# Patient Record
Sex: MALE | Race: WHITE | NOT HISPANIC OR LATINO
[De-identification: names, ages, dates, MRNs, and addresses within clinical notes are randomized per-mention and may not be internally consistent; named-entity substitution may affect disease eponyms.]

---

## 2019-06-05 ENCOUNTER — APPOINTMENT (OUTPATIENT)
Dept: COLORECTAL SURGERY | Facility: CLINIC | Age: 74
End: 2019-06-05
Payer: MEDICARE

## 2019-06-05 VITALS
HEART RATE: 60 BPM | HEIGHT: 71 IN | DIASTOLIC BLOOD PRESSURE: 80 MMHG | SYSTOLIC BLOOD PRESSURE: 120 MMHG | BODY MASS INDEX: 24.5 KG/M2 | RESPIRATION RATE: 14 BRPM | WEIGHT: 175 LBS

## 2019-06-05 DIAGNOSIS — Z78.9 OTHER SPECIFIED HEALTH STATUS: ICD-10-CM

## 2019-06-05 DIAGNOSIS — R10.9 UNSPECIFIED ABDOMINAL PAIN: ICD-10-CM

## 2019-06-05 PROCEDURE — 99203 OFFICE O/P NEW LOW 30 MIN: CPT

## 2019-06-06 NOTE — PHYSICAL EXAM
[Normal Heart Sounds] : normal heart sounds [Normal Breath Sounds] : Normal breath sounds [Normal Rate and Rhythm] : normal rate and rhythm [No Edema] : No edema [Alert] : alert [Oriented to Person] : oriented to person [Oriented to Place] : oriented to place [Oriented to Time] : oriented to time [Calm] : calm [de-identified] : flat soft +BS NT/ND left sided ostomy [de-identified] : NC/AT [de-identified] : +ROM [de-identified] : intact

## 2019-06-06 NOTE — ASSESSMENT
[FreeTextEntry1] : Pleasant 73-year-old white gentleman who presents with a chief complaint of right upper abdominal pain. 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 Despite my urging him to have appropriate follow-up he has not done so. He has not followed up with oncology and never had a follow-up colonoscopy.\par \par Physical examination reveals a soft, nontender, nondistended abdomen. His midline scar is well healed. He has a permanent left lower quadrant end colostomy with a well adherent ostomy appliance in place. No mass is  palpable in the vicinity of his pain.\par \par We will start by obtaining a CAT scan of the abdomen and pelvis to be sure that there is no recurrent or metastatic disease. He also requires a colonoscopy via the ostomy. If this work-up is negative, I will refer him to gastroenterology for additional testing.

## 2019-06-06 NOTE — HISTORY OF PRESENT ILLNESS
[FreeTextEntry1] : 72yo WM underwent APR in 7.2010 secondary to carcinoma of the rectum (received neoadjuvant/adjuvant therapies).  Denies oncologic or GI follow-up.  \par \par Over the past 6months pt has been experiencing intermittent abdominal discomfort (to right of umbilicus).  Over the past month pain more constant.  Intentionally limiting his diet. Denies N/V or change in stoma output.  Has scheduled GI appt for August 2019.

## 2019-06-07 ENCOUNTER — APPOINTMENT (OUTPATIENT)
Dept: CT IMAGING | Facility: CLINIC | Age: 74
End: 2019-06-07
Payer: MEDICARE

## 2019-06-07 ENCOUNTER — OUTPATIENT (OUTPATIENT)
Dept: OUTPATIENT SERVICES | Facility: HOSPITAL | Age: 74
LOS: 1 days | End: 2019-06-07
Payer: MEDICARE

## 2019-06-07 DIAGNOSIS — Z00.8 ENCOUNTER FOR OTHER GENERAL EXAMINATION: ICD-10-CM

## 2019-06-07 PROCEDURE — 74177 CT ABD & PELVIS W/CONTRAST: CPT

## 2019-06-07 PROCEDURE — 82565 ASSAY OF CREATININE: CPT

## 2019-06-07 PROCEDURE — 74177 CT ABD & PELVIS W/CONTRAST: CPT | Mod: 26

## 2019-07-15 ENCOUNTER — APPOINTMENT (OUTPATIENT)
Dept: COLORECTAL SURGERY | Facility: CLINIC | Age: 74
End: 2019-07-15
Payer: MEDICARE

## 2019-07-15 DIAGNOSIS — K63.5 POLYP OF COLON: ICD-10-CM

## 2019-07-15 PROCEDURE — 45385 COLONOSCOPY W/LESION REMOVAL: CPT

## 2019-07-17 LAB — CORE LAB BIOPSY: NORMAL

## 2019-09-17 ENCOUNTER — APPOINTMENT (OUTPATIENT)
Dept: GASTROENTEROLOGY | Facility: CLINIC | Age: 74
End: 2019-09-17

## 2019-09-20 ENCOUNTER — APPOINTMENT (OUTPATIENT)
Dept: GASTROENTEROLOGY | Facility: CLINIC | Age: 74
End: 2019-09-20

## 2019-12-10 ENCOUNTER — APPOINTMENT (OUTPATIENT)
Dept: GASTROENTEROLOGY | Facility: CLINIC | Age: 74
End: 2019-12-10

## 2024-05-01 ENCOUNTER — APPOINTMENT (OUTPATIENT)
Dept: OTOLARYNGOLOGY | Facility: CLINIC | Age: 79
End: 2024-05-01
Payer: MEDICARE

## 2024-05-01 VITALS
RESPIRATION RATE: 16 BRPM | WEIGHT: 185 LBS | SYSTOLIC BLOOD PRESSURE: 164 MMHG | HEIGHT: 71 IN | BODY MASS INDEX: 25.9 KG/M2 | DIASTOLIC BLOOD PRESSURE: 91 MMHG | HEART RATE: 95 BPM | OXYGEN SATURATION: 96 %

## 2024-05-01 DIAGNOSIS — R22.1 LOCALIZED SWELLING, MASS AND LUMP, NECK: ICD-10-CM

## 2024-05-01 PROCEDURE — 31575 DIAGNOSTIC LARYNGOSCOPY: CPT

## 2024-05-01 PROCEDURE — 99204 OFFICE O/P NEW MOD 45 MIN: CPT | Mod: 25

## 2024-05-01 NOTE — HISTORY OF PRESENT ILLNESS
[de-identified] : 77yo male who is accompanied by his wife and daughter Jessie. He is being referred by Dr. CARON Lawson for recent MRI findings, MRI done for right ear pain.Denies pain, dysphagia,, dysphonia and or dyspnea. He feels as he has water in the right ear and feels pressure. Recent  visit to urgent care for ear pain. Has been taking Tylenol and Advil for discpomfort.  4/11/2024 MRI STN showing mixed solid/cystic right oral cavity/oropharyngeal mass, measuring up to 49mm.  -Enlarged 19mm right and 15mm left level 2A lymph nodes.-Asymmeytric right parotid gland which may represent parotitis. - multinodular thyroid with 23 mm right thyroid nodule. -large  right and moderate left mastoid effusions.   Denies cardiac or lung issues. not on blood thinners.  Reports having shingles on his right side scalp in Feb.  Denies hx. of head and neck cancer or skin cancer.  Smoke for over [id number] quit 2010. Used to drink 2 beers a day but now about a 6 pack a week.  all archer started w R sided ear pain, same for 5-6 mo. is not pain,but rather pressure in R ear. no swaloow cx. no resp cxs. quir smoking 2010.

## 2024-05-01 NOTE — CONSULT LETTER
[Dear  ___] : Dear  [unfilled], [Consult Letter:] : I had the pleasure of evaluating your patient, [unfilled]. [Please see my note below.] : Please see my note below. [Consult Closing:] : Thank you very much for allowing me to participate in the care of this patient.  If you have any questions, please do not hesitate to contact me. [Sincerely,] : Sincerely, [FreeTextEntry2] : Adam Lawson MD ([place], [place]) [FreeTextEntry3] : Dhiraj Camarillo MD, FACS     [facility] Associate Chair    Department of Otolaryngology  Professor Otolaryngology & Molecular Medicine [facility]

## 2024-05-01 NOTE — PROCEDURE
[Lesion] : lesion identified by mirror examination needing further evaluation [None] : none [Flexible Endoscope] : examined with the flexible endoscope [Normal] : normal [de-identified] : erythema R BOT.  nl larynx [de-identified] : see above

## 2024-05-01 NOTE — PHYSICAL EXAM
[de-identified] : no palp nodes [FreeTextEntry1] : firm mass R tonsil w extension to R lat BOT.  [Normal] : no rashes

## 2024-05-02 ENCOUNTER — NON-APPOINTMENT (OUTPATIENT)
Age: 79
End: 2024-05-02

## 2024-05-02 ENCOUNTER — OUTPATIENT (OUTPATIENT)
Dept: OUTPATIENT SERVICES | Facility: HOSPITAL | Age: 79
LOS: 1 days | End: 2024-05-02

## 2024-05-02 VITALS
OXYGEN SATURATION: 97 % | DIASTOLIC BLOOD PRESSURE: 116 MMHG | WEIGHT: 184.97 LBS | TEMPERATURE: 98 F | HEIGHT: 71 IN | HEART RATE: 63 BPM | SYSTOLIC BLOOD PRESSURE: 176 MMHG | RESPIRATION RATE: 17 BRPM

## 2024-05-02 DIAGNOSIS — C10.9 MALIGNANT NEOPLASM OF OROPHARYNX, UNSPECIFIED: ICD-10-CM

## 2024-05-02 DIAGNOSIS — Z92.21 PERSONAL HISTORY OF ANTINEOPLASTIC CHEMOTHERAPY: Chronic | ICD-10-CM

## 2024-05-02 DIAGNOSIS — Z93.3 COLOSTOMY STATUS: Chronic | ICD-10-CM

## 2024-05-02 DIAGNOSIS — C20 MALIGNANT NEOPLASM OF RECTUM: Chronic | ICD-10-CM

## 2024-05-02 DIAGNOSIS — Z92.3 PERSONAL HISTORY OF IRRADIATION: Chronic | ICD-10-CM

## 2024-05-02 LAB
ANION GAP SERPL CALC-SCNC: 15 MMOL/L — HIGH (ref 7–14)
BUN SERPL-MCNC: 13 MG/DL — SIGNIFICANT CHANGE UP (ref 7–23)
CALCIUM SERPL-MCNC: 9.4 MG/DL — SIGNIFICANT CHANGE UP (ref 8.4–10.5)
CHLORIDE SERPL-SCNC: 106 MMOL/L — SIGNIFICANT CHANGE UP (ref 98–107)
CO2 SERPL-SCNC: 18 MMOL/L — LOW (ref 22–31)
CREAT SERPL-MCNC: 0.99 MG/DL — SIGNIFICANT CHANGE UP (ref 0.5–1.3)
EGFR: 79 ML/MIN/1.73M2 — SIGNIFICANT CHANGE UP
GLUCOSE SERPL-MCNC: 104 MG/DL — HIGH (ref 70–99)
HCT VFR BLD CALC: 48.1 % — SIGNIFICANT CHANGE UP (ref 39–50)
HGB BLD-MCNC: 16.4 G/DL — SIGNIFICANT CHANGE UP (ref 13–17)
MCHC RBC-ENTMCNC: 30.6 PG — SIGNIFICANT CHANGE UP (ref 27–34)
MCHC RBC-ENTMCNC: 34.1 GM/DL — SIGNIFICANT CHANGE UP (ref 32–36)
MCV RBC AUTO: 89.7 FL — SIGNIFICANT CHANGE UP (ref 80–100)
NRBC # BLD: 0 /100 WBCS — SIGNIFICANT CHANGE UP (ref 0–0)
NRBC # FLD: 0 K/UL — SIGNIFICANT CHANGE UP (ref 0–0)
PLATELET # BLD AUTO: 372 K/UL — SIGNIFICANT CHANGE UP (ref 150–400)
POTASSIUM SERPL-MCNC: 4 MMOL/L — SIGNIFICANT CHANGE UP (ref 3.5–5.3)
POTASSIUM SERPL-SCNC: 4 MMOL/L — SIGNIFICANT CHANGE UP (ref 3.5–5.3)
RBC # BLD: 5.36 M/UL — SIGNIFICANT CHANGE UP (ref 4.2–5.8)
RBC # FLD: 14.1 % — SIGNIFICANT CHANGE UP (ref 10.3–14.5)
SODIUM SERPL-SCNC: 139 MMOL/L — SIGNIFICANT CHANGE UP (ref 135–145)
WBC # BLD: 9.45 K/UL — SIGNIFICANT CHANGE UP (ref 3.8–10.5)
WBC # FLD AUTO: 9.45 K/UL — SIGNIFICANT CHANGE UP (ref 3.8–10.5)

## 2024-05-02 PROCEDURE — 93010 ELECTROCARDIOGRAM REPORT: CPT

## 2024-05-02 RX ORDER — SODIUM CHLORIDE 9 MG/ML
1000 INJECTION, SOLUTION INTRAVENOUS
Refills: 0 | Status: DISCONTINUED | OUTPATIENT
Start: 2024-05-09 | End: 2024-05-23

## 2024-05-02 RX ORDER — SODIUM CHLORIDE 9 MG/ML
3 INJECTION INTRAMUSCULAR; INTRAVENOUS; SUBCUTANEOUS EVERY 8 HOURS
Refills: 0 | Status: DISCONTINUED | OUTPATIENT
Start: 2024-05-09 | End: 2024-05-23

## 2024-05-02 NOTE — H&P PST ADULT - BIRTH SEX
Patient notified and I provided him with the number to [facility]. I also advised him to do warm compresses (clean washcloths each time) and change pillowcase daily.    Male

## 2024-05-02 NOTE — H&P PST ADULT - HISTORY OF PRESENT ILLNESS
77 y/o male with h/o rectal cancer s/p abdominoperineal resection with creation of colostomy in 2010, chemo and RT presents to PST preop for direct laryngoscopy, biopsy oropharynx, tonsillectomy. Pt reports right ear pain x 7 months. MRI on 4/11/2024 showed mixed solid/cystic right oral cavity/oropharyngeal mass.

## 2024-05-02 NOTE — H&P PST ADULT - NSICDXPASTSURGICALHX_GEN_ALL_CORE_FT
PAST SURGICAL HISTORY:  History of cancer chemotherapy     Rectal cancer     S/P Colonoscopy 3/10    S/P colostomy     S/P radiation therapy

## 2024-05-02 NOTE — H&P PST ADULT - PROBLEM SELECTOR PLAN 1
PPS 50%. Skin care PRN. Assist with ADL's PRN. Appreciate PT involvement. preop for direct laryngoscopy, biopsy oropharynx, tonsillectomy on 5/9/24  preop instructions given, pt verbalized understanding  GI prophylaxis provided  cbc, bmp pending  repeat /90 & 150/94- pt referred to Dr. Mitchell for preop evaluation due to elevated BP reading. Pt has no h/o HTN and does not have a primary care physician. preop for direct laryngoscopy, biopsy oropharynx, tonsillectomy on 5/9/24  preop instructions given, pt verbalized understanding  GI prophylaxis provided  cbc, bmp pending  repeat /90 & 150/94- pt referred to Dr. Mitchell for preop evaluation due to elevated BP reading. Pt has no h/o HTN and does not have a primary care physician. He is scheduled to see Dr. Mitchell at 11am on 5/3/24.   email sent to surgeon.

## 2024-05-02 NOTE — H&P PST ADULT - NEGATIVE ENMT SYMPTOMS
no hearing difficulty/no sinus symptoms/no nasal obstruction/no post-nasal discharge/no nose bleeds/no recurrent cold sores/no abnormal taste sensation/no gum bleeding/no dry mouth/no throat pain/no dysphagia

## 2024-05-02 NOTE — H&P PST ADULT - ASSESSMENT
79 y/o male with h/o rectal cancer s/p abdominoperineal resection with creation of colostomy in 2010, chemo and RT presents to PST preop for direct laryngoscopy, biopsy oropharynx, tonsillectomy. Pt reports right ear pain x 7 months. MRI on 4/11/2024 showed mixed solid/cystic right oral cavity/oropharyngeal mass.

## 2024-05-07 ENCOUNTER — OUTPATIENT (OUTPATIENT)
Dept: OUTPATIENT SERVICES | Facility: HOSPITAL | Age: 79
LOS: 1 days | End: 2024-05-07
Payer: MEDICARE

## 2024-05-07 ENCOUNTER — APPOINTMENT (OUTPATIENT)
Dept: ULTRASOUND IMAGING | Facility: CLINIC | Age: 79
End: 2024-05-07
Payer: MEDICARE

## 2024-05-07 DIAGNOSIS — R22.1 LOCALIZED SWELLING, MASS AND LUMP, NECK: ICD-10-CM

## 2024-05-07 DIAGNOSIS — Z92.21 PERSONAL HISTORY OF ANTINEOPLASTIC CHEMOTHERAPY: Chronic | ICD-10-CM

## 2024-05-07 DIAGNOSIS — Z93.3 COLOSTOMY STATUS: Chronic | ICD-10-CM

## 2024-05-07 DIAGNOSIS — Z92.3 PERSONAL HISTORY OF IRRADIATION: Chronic | ICD-10-CM

## 2024-05-07 DIAGNOSIS — C20 MALIGNANT NEOPLASM OF RECTUM: Chronic | ICD-10-CM

## 2024-05-07 PROBLEM — C10.9 MALIGNANT NEOPLASM OF OROPHARYNX, UNSPECIFIED: Chronic | Status: ACTIVE | Noted: 2024-05-02

## 2024-05-07 PROCEDURE — 76536 US EXAM OF HEAD AND NECK: CPT

## 2024-05-07 PROCEDURE — 76536 US EXAM OF HEAD AND NECK: CPT | Mod: 26

## 2024-05-08 ENCOUNTER — APPOINTMENT (OUTPATIENT)
Dept: CV DIAGNOSTICS | Facility: HOSPITAL | Age: 79
End: 2024-05-08

## 2024-05-08 ENCOUNTER — RESULT REVIEW (OUTPATIENT)
Age: 79
End: 2024-05-08

## 2024-05-08 ENCOUNTER — TRANSCRIPTION ENCOUNTER (OUTPATIENT)
Age: 79
End: 2024-05-08

## 2024-05-08 ENCOUNTER — OUTPATIENT (OUTPATIENT)
Dept: OUTPATIENT SERVICES | Facility: HOSPITAL | Age: 79
LOS: 1 days | End: 2024-05-08
Payer: MEDICARE

## 2024-05-08 DIAGNOSIS — Z92.21 PERSONAL HISTORY OF ANTINEOPLASTIC CHEMOTHERAPY: Chronic | ICD-10-CM

## 2024-05-08 DIAGNOSIS — Z92.3 PERSONAL HISTORY OF IRRADIATION: Chronic | ICD-10-CM

## 2024-05-08 DIAGNOSIS — Z93.3 COLOSTOMY STATUS: Chronic | ICD-10-CM

## 2024-05-08 DIAGNOSIS — R94.39 ABNORMAL RESULT OF OTHER CARDIOVASCULAR FUNCTION STUDY: ICD-10-CM

## 2024-05-08 PROCEDURE — 93016 CV STRESS TEST SUPVJ ONLY: CPT | Mod: GC,MC

## 2024-05-08 PROCEDURE — 93018 CV STRESS TEST I&R ONLY: CPT | Mod: GC,MC

## 2024-05-08 PROCEDURE — 78452 HT MUSCLE IMAGE SPECT MULT: CPT | Mod: 26,MC

## 2024-05-08 NOTE — ASU PATIENT PROFILE, ADULT - FALL HARM RISK - UNIVERSAL INTERVENTIONS
Bed in lowest position, wheels locked, appropriate side rails in place/Call bell, personal items and telephone in reach/Instruct patient to call for assistance before getting out of bed or chair/Non-slip footwear when patient is out of bed/[place] to call system/Physically safe environment - no spills, clutter or unnecessary equipment/Purposeful Proactive Rounding/Room/bathroom lighting operational, light cord in reach

## 2024-05-08 NOTE — ASU PATIENT PROFILE, ADULT - NSICDXPASTMEDICALHX_GEN_ALL_CORE_FT
PAST MEDICAL HISTORY:  Colostomy status     Hemorrhoid     Malignant neoplasm of oropharynx     Neck mass     Rectal Neoplasm treated with oral chemothearpy and radiation 5/10- 6/10

## 2024-05-08 NOTE — ASU PATIENT PROFILE, ADULT - NS PRO ABUSE SCREEN SUSPICION NEGLECT YN
Pt here for cbc, RN review.  WBC/ANC low.  Reviewed with Dr. Dixon, no new orders.  Ok to refill voltaren and new script for patanol eye drops for allergies sent in.  No fever/chills.  Pt instructed on infection precautions.  V/u.    Lab Results   Component Value Date    WBC 0.69 (C) 03/29/2019    HGB 9.5 (L) 03/29/2019    HCT 30.3 (L) 03/29/2019    MCV 85.4 03/29/2019     03/29/2019        no

## 2024-05-09 ENCOUNTER — RESULT REVIEW (OUTPATIENT)
Age: 79
End: 2024-05-09

## 2024-05-09 ENCOUNTER — APPOINTMENT (OUTPATIENT)
Dept: OTOLARYNGOLOGY | Facility: HOSPITAL | Age: 79
End: 2024-05-09

## 2024-05-09 ENCOUNTER — TRANSCRIPTION ENCOUNTER (OUTPATIENT)
Age: 79
End: 2024-05-09

## 2024-05-09 ENCOUNTER — OUTPATIENT (OUTPATIENT)
Dept: OUTPATIENT SERVICES | Facility: HOSPITAL | Age: 79
LOS: 1 days | Discharge: HOME | End: 2024-05-09
Payer: MEDICARE

## 2024-05-09 VITALS
HEART RATE: 111 BPM | DIASTOLIC BLOOD PRESSURE: 89 MMHG | HEIGHT: 71 IN | WEIGHT: 184.97 LBS | SYSTOLIC BLOOD PRESSURE: 131 MMHG | RESPIRATION RATE: 16 BRPM | OXYGEN SATURATION: 96 % | TEMPERATURE: 98 F

## 2024-05-09 VITALS
DIASTOLIC BLOOD PRESSURE: 68 MMHG | RESPIRATION RATE: 16 BRPM | HEART RATE: 62 BPM | OXYGEN SATURATION: 100 % | SYSTOLIC BLOOD PRESSURE: 126 MMHG

## 2024-05-09 DIAGNOSIS — Z92.21 PERSONAL HISTORY OF ANTINEOPLASTIC CHEMOTHERAPY: Chronic | ICD-10-CM

## 2024-05-09 DIAGNOSIS — C10.9 MALIGNANT NEOPLASM OF OROPHARYNX, UNSPECIFIED: ICD-10-CM

## 2024-05-09 DIAGNOSIS — C20 MALIGNANT NEOPLASM OF RECTUM: Chronic | ICD-10-CM

## 2024-05-09 DIAGNOSIS — Z93.3 COLOSTOMY STATUS: Chronic | ICD-10-CM

## 2024-05-09 DIAGNOSIS — Z92.3 PERSONAL HISTORY OF IRRADIATION: Chronic | ICD-10-CM

## 2024-05-09 PROCEDURE — 88305 TISSUE EXAM BY PATHOLOGIST: CPT | Mod: 26

## 2024-05-09 PROCEDURE — 88365 INSITU HYBRIDIZATION (FISH): CPT | Mod: 26

## 2024-05-09 PROCEDURE — 31525 DX LARYNGOSCOPY EXCL NB: CPT | Mod: GC

## 2024-05-09 PROCEDURE — 42800 BIOPSY OF THROAT: CPT | Mod: GC,RT

## 2024-05-09 PROCEDURE — 88331 PATH CONSLTJ SURG 1 BLK 1SPC: CPT | Mod: 26

## 2024-05-09 PROCEDURE — 88342 IMHCHEM/IMCYTCHM 1ST ANTB: CPT | Mod: 26

## 2024-05-09 RX ORDER — ACETAMINOPHEN 500 MG
650 TABLET ORAL EVERY 6 HOURS
Refills: 0 | Status: DISCONTINUED | OUTPATIENT
Start: 2024-05-09 | End: 2024-05-23

## 2024-05-09 RX ORDER — ONDANSETRON 8 MG/1
4 TABLET, FILM COATED ORAL ONCE
Refills: 0 | Status: DISCONTINUED | OUTPATIENT
Start: 2024-05-09 | End: 2024-05-23

## 2024-05-09 RX ORDER — HYDROMORPHONE HYDROCHLORIDE 2 MG/ML
0.5 INJECTION INTRAMUSCULAR; INTRAVENOUS; SUBCUTANEOUS
Refills: 0 | Status: DISCONTINUED | OUTPATIENT
Start: 2024-05-09 | End: 2024-05-09

## 2024-05-09 RX ORDER — OXYCODONE HYDROCHLORIDE 5 MG/1
5 TABLET ORAL ONCE
Refills: 0 | Status: DISCONTINUED | OUTPATIENT
Start: 2024-05-09 | End: 2024-05-09

## 2024-05-09 NOTE — BRIEF OPERATIVE NOTE - NSICDXBRIEFPROCEDURE_GEN_ALL_CORE_FT
PROCEDURES:  Direct laryngoscopy with biopsy 09-May-2024 14:08:06  Nanette Dumont  Diagnostic pharyngoscopy 09-May-2024 14:09:16  Nanette Dumont

## 2024-05-09 NOTE — ASU DISCHARGE PLAN (ADULT/PEDIATRIC) - CARE PROVIDER_API CALL
Dhiraj Camarillo  Head/Neck Surgery  [address] [place] [address]  Phone: [phone]  Fax: [phone]  Follow Up Time:

## 2024-05-09 NOTE — ASU DISCHARGE PLAN (ADULT/PEDIATRIC) - NS MD DC FALL RISK RISK
For information on Fall & Injury Prevention, visit: [email or web address] OR  [email or web address] OR  https://www.cdc.gov/steadi/patient.html

## 2024-05-09 NOTE — ASU DISCHARGE PLAN (ADULT/PEDIATRIC) - NURSING INSTRUCTIONS
Watch for signs of infection; redness, swelling, fever, chills or heat, report such symptoms to the MD. Drink 6-8 glasses of fluids daily to promote hydration. No heavy lifting, pulling or pushing heavy objects. Follow up with surgical MD. Tylenol given in the OR at 1:40pm, do not take any Tylenol products till  7:40pm

## 2024-05-12 ENCOUNTER — OUTPATIENT (OUTPATIENT)
Dept: OUTPATIENT SERVICES | Facility: HOSPITAL | Age: 79
LOS: 1 days | End: 2024-05-12
Payer: MEDICARE

## 2024-05-12 ENCOUNTER — APPOINTMENT (OUTPATIENT)
Dept: NUCLEAR MEDICINE | Facility: IMAGING CENTER | Age: 79
End: 2024-05-12
Payer: MEDICARE

## 2024-05-12 DIAGNOSIS — C10.9 MALIGNANT NEOPLASM OF OROPHARYNX, UNSPECIFIED: ICD-10-CM

## 2024-05-12 DIAGNOSIS — Z93.3 COLOSTOMY STATUS: Chronic | ICD-10-CM

## 2024-05-12 DIAGNOSIS — Z92.21 PERSONAL HISTORY OF ANTINEOPLASTIC CHEMOTHERAPY: Chronic | ICD-10-CM

## 2024-05-12 DIAGNOSIS — C20 MALIGNANT NEOPLASM OF RECTUM: Chronic | ICD-10-CM

## 2024-05-12 DIAGNOSIS — Z92.3 PERSONAL HISTORY OF IRRADIATION: Chronic | ICD-10-CM

## 2024-05-12 PROCEDURE — 78815 PET IMAGE W/CT SKULL-THIGH: CPT | Mod: 26,PI,MH

## 2024-05-12 PROCEDURE — 78815 PET IMAGE W/CT SKULL-THIGH: CPT

## 2024-05-12 PROCEDURE — A9552: CPT

## 2024-05-13 ENCOUNTER — NON-APPOINTMENT (OUTPATIENT)
Age: 79
End: 2024-05-13

## 2024-05-14 ENCOUNTER — OUTPATIENT (OUTPATIENT)
Dept: OUTPATIENT SERVICES | Facility: HOSPITAL | Age: 79
LOS: 1 days | Discharge: HOME | End: 2024-05-14

## 2024-05-14 DIAGNOSIS — Z92.3 PERSONAL HISTORY OF IRRADIATION: Chronic | ICD-10-CM

## 2024-05-14 DIAGNOSIS — Z92.21 PERSONAL HISTORY OF ANTINEOPLASTIC CHEMOTHERAPY: Chronic | ICD-10-CM

## 2024-05-14 DIAGNOSIS — Z93.3 COLOSTOMY STATUS: Chronic | ICD-10-CM

## 2024-05-14 DIAGNOSIS — C20 MALIGNANT NEOPLASM OF RECTUM: Chronic | ICD-10-CM

## 2024-05-14 DIAGNOSIS — C10.9 MALIGNANT NEOPLASM OF OROPHARYNX, UNSPECIFIED: ICD-10-CM

## 2024-05-14 LAB — SURGICAL PATHOLOGY STUDY: SIGNIFICANT CHANGE UP

## 2024-05-20 ENCOUNTER — APPOINTMENT (OUTPATIENT)
Dept: RADIATION ONCOLOGY | Facility: CLINIC | Age: 79
End: 2024-05-20
Payer: MEDICARE

## 2024-05-20 ENCOUNTER — OUTPATIENT (OUTPATIENT)
Dept: OUTPATIENT SERVICES | Facility: HOSPITAL | Age: 79
LOS: 1 days | Discharge: HOME | End: 2024-05-20
Payer: MEDICARE

## 2024-05-20 ENCOUNTER — NON-APPOINTMENT (OUTPATIENT)
Age: 79
End: 2024-05-20

## 2024-05-20 DIAGNOSIS — C20 MALIGNANT NEOPLASM OF RECTUM: ICD-10-CM

## 2024-05-20 DIAGNOSIS — I10 ESSENTIAL (PRIMARY) HYPERTENSION: ICD-10-CM

## 2024-05-20 DIAGNOSIS — Z92.3 PERSONAL HISTORY OF IRRADIATION: Chronic | ICD-10-CM

## 2024-05-20 DIAGNOSIS — Z92.21 PERSONAL HISTORY OF ANTINEOPLASTIC CHEMOTHERAPY: Chronic | ICD-10-CM

## 2024-05-20 DIAGNOSIS — Z93.3 COLOSTOMY STATUS: Chronic | ICD-10-CM

## 2024-05-20 DIAGNOSIS — C20 MALIGNANT NEOPLASM OF RECTUM: Chronic | ICD-10-CM

## 2024-05-20 PROCEDURE — 99205 OFFICE O/P NEW HI 60 MIN: CPT

## 2024-05-20 NOTE — HISTORY OF PRESENT ILLNESS
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    Onc History: Past history of Rectal cancer s/p neoadjuvant/adjuvant therapies and Abdominoperineal resection 2010. He had MRI done for 6 months history of right ear Fullness and pressure: 4/11/2024 MRI soft tissue neck (LHR) showing mixed solid/cystic right oral cavity/oropharyngeal mass, measuring up to 49mm. -Enlarged 19mm right and 15mm left level 2A lymph nodes. -Asymmetric right parotid gland which may represent parotitis. - multinodular thyroid with 23 mm right thyroid nodule. -large right and moderate left mastoid effusions.  5/1/2024 Established care with Dr Camarillo  5/9/2024 Biopsy Tonsil, superior pole of right tonsil, excision -Invasive squamous cell carcinoma, predominantly non-keratinizing Positive for p16  5/12/2024 PET/CT IMPRESSION: 1.  Intensely FDG-avid mass involving the RIGHT tongue base and tonsil, consistent with malignant involvement. 2.  Bilaterally hypermetabolic lymph nodes (more so on the LEFT neck compared with the RIGHT), suspicious for metastatic disease. 3.  Foci in the RIGHT colon may be due to diverticular disease; consider further evaluation with CT or MR enterography, as malignant polyps may also present in this fashion. History of previous colon cancer noted. 4.  Focus in the subcarinal esophagus an area of thickening; suggest UPPER GI fluoroscopy and/or EGD if this will change patient management.  5/202024 He presents here for consideration of radiation therapy. Ongoing Right ear fullness and pressure. Denies pain, dysphagia,, dysphonia and or dyspnea. Dental clearance already obtained from [facility].  5/20/3034 Follow up for GI Dr Camacho for avidity on PET.  5/21/2024 Seeing Dr Singer for Concurrent chemotherapy.

## 2024-05-20 NOTE — END OF VISIT
[FreeTextEntry3] : We had an extensive discussion of the patient's imaging, labs, and pathology, and reviewed them together; I independently evaluated these and discussed their significance with the patient and family. I have also been involved in the multidisciplinary discussion of his care with his other providers. We discussed the natural history of oropharynx cancer and its management. [Time Spent: ___ minutes] : I have spent [unfilled] minutes of time on the encounter.

## 2024-05-20 NOTE — PHYSICAL EXAM
[Normal] : well developed, well nourished, in no acute distress [Oriented To Time, Place, And Person] : oriented to person, place, and time [de-identified] : Limited due to nature of visit. Telehealth

## 2024-05-20 NOTE — REASON FOR VISIT
[Consideration of Curative Therapy] : consideration of curative therapy for [Head and Neck Cancer] : head and neck cancer [Spouse] : spouse [Family Member] : family member [Other: _____] : [unfilled]

## 2024-05-21 ENCOUNTER — APPOINTMENT (OUTPATIENT)
Dept: HEMATOLOGY ONCOLOGY | Facility: CLINIC | Age: 79
End: 2024-05-21
Payer: MEDICARE

## 2024-05-21 PROCEDURE — 99205 OFFICE O/P NEW HI 60 MIN: CPT

## 2024-05-21 PROCEDURE — G2211 COMPLEX E/M VISIT ADD ON: CPT

## 2024-05-21 RX ORDER — METOCLOPRAMIDE 10 MG/1
10 TABLET ORAL EVERY 6 HOURS
Qty: 40 | Refills: 0 | Status: ACTIVE | COMMUNITY
Start: 2024-05-21 | End: 1900-01-01

## 2024-05-22 ENCOUNTER — NON-APPOINTMENT (OUTPATIENT)
Age: 79
End: 2024-05-22

## 2024-05-22 DIAGNOSIS — C10.9 MALIGNANT NEOPLASM OF OROPHARYNX, UNSPECIFIED: ICD-10-CM

## 2024-05-29 ENCOUNTER — NON-APPOINTMENT (OUTPATIENT)
Age: 79
End: 2024-05-29

## 2024-05-29 PROCEDURE — 77470 SPECIAL RADIATION TREATMENT: CPT | Mod: 26

## 2024-05-29 PROCEDURE — 77263 THER RADIOLOGY TX PLNG CPLX: CPT

## 2024-05-29 PROCEDURE — 77334 RADIATION TREATMENT AID(S): CPT | Mod: 26

## 2024-05-29 NOTE — HISTORY OF PRESENT ILLNESS
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  Onc History: Past history of Rectal cancer s/p neoadjuvant/adjuvant therapies and Abdominoperineal resection 2010. was having ear pain for quite some time and was treated with abx for a while before referred to ENT.  He had MRI done for 6 months history of right ear Fullness and pressure: 4/11/2024 MRI soft tissue neck (LHR) showing mixed solid/cystic right oral cavity/oropharyngeal mass, measuring up to 49mm. -Enlarged 19mm right and 15mm left level 2A lymph nodes. -Asymmetric right parotid gland which may represent parotitis. - multinodular thyroid with 23 mm right thyroid nodule. -large right and moderate left mastoid effusions.  5/1/2024 Established care with Dr Camarillo.  5/9/2024 Biopsy Tonsil, superior pole of right tonsil, excision -Invasive squamous cell carcinoma, predominantly non-keratinizing Positive for p16 MRI LHR 4/16/24:  US thyroid 5/7/24: Scattered thyroid nodules including dominant right thyroid nodule. Consider further evaluation with ultrasound-guided fine-needle aspiration as clinically indicated. Subcentimeter hypoechoic right parotid nodule corresponding to the area of palpable abnormality.  TI-RAD 3: Mildly suspicious (FNA if > 2.5 cm, Follow if > 1.5 cm) 5/12/2024 PET/CT IMPRESSION: 1. Intensely FDG-avid mass involving the RIGHT tongue base and tonsil, consistent with malignant involvement. 2. Bilaterally hypermetabolic lymph nodes (more so on the LEFT neck compared with the RIGHT), suspicious for metastatic disease. 3. Foci in the RIGHT colon may be due to diverticular disease; consider further evaluation with CT or MR enterography, as malignant polyps may also present in this fashion. History of previous colon cancer noted. 4. Focus in the subcarinal esophagus an area of thickening; suggest UPPER GI fluoroscopy and/or EGD if this will change patient management.  5/202024 He presents here for consideration of radiation therapy. Ongoing Right ear fullness and pressure. Denies pain, dysphagia, dysphonia and or dyspnea. Dental clearance already obtained from [facility].  5/20/2024 Follow up for GI Dr Camacho for avidity on PET. Was felt to be polyps and this will be removed soon.  [de-identified] : +p16 SCC of the Right Tonsil

## 2024-05-29 NOTE — CONSULT LETTER
[Dear  ___] : Dear  [unfilled], [Consult Letter:] : I had the pleasure of evaluating your patient, [unfilled]. [Please see my note below.] : Please see my note below. [Consult Closing:] : Thank you very much for allowing me to participate in the care of this patient.  If you have any questions, please do not hesitate to contact me. [Sincerely,] : Sincerely, [FreeTextEntry2] : Dr. Dhiraj Camarillo [FreeTextEntry3] : Luis Alberto Singer MD  [DrLaurel  ___] : Dr. FIGUEROA

## 2024-05-29 NOTE — ASSESSMENT
[FreeTextEntry1] : 77 yo m with 40PY remote tobacco use, quit 12 years ago, colon cancer, HTN (on meds), with stage II P16 pos [facility] presents for initial consultation to discuss the role of systemic therapy via telehealth.  We discussed treatment options for locally advanced oropharynx cancer, including surgery followed by radiotherapy +/- chemotherapy or definitive chemoradiation. Given the high likelihood of adjuvant treatment following surgery, we recommend definitive concurrent chemo-radiation therapy.  Plan is to start cisplatin-based treatment (side effect profile discussed) Will require audiometry clearance Labs, cbc and cmp reviewed  PET scan reviewed and given FDG uptake within esophagus/colon pending GI for endoscopy/colonscopy] Dental clearance already obtained from [facility] To follow up with Dr. Monson (Rad onc) and Dr. George (ENT) Monitor scattered thyroid nodules including dominant right thyroid nodule RTC in 1 week prn  I was with the hematology/ oncology fellow, Dr. Tai for the entire visit and agree with above documentation

## 2024-05-30 ENCOUNTER — APPOINTMENT (OUTPATIENT)
Dept: OTOLARYNGOLOGY | Facility: CLINIC | Age: 79
End: 2024-05-30

## 2024-06-04 ENCOUNTER — APPOINTMENT (OUTPATIENT)
Dept: OTOLARYNGOLOGY | Facility: CLINIC | Age: 79
End: 2024-06-04
Payer: MEDICARE

## 2024-06-04 VITALS
WEIGHT: 180 LBS | HEART RATE: 88 BPM | HEIGHT: 71 IN | BODY MASS INDEX: 25.2 KG/M2 | DIASTOLIC BLOOD PRESSURE: 81 MMHG | SYSTOLIC BLOOD PRESSURE: 157 MMHG

## 2024-06-04 PROCEDURE — 99214 OFFICE O/P EST MOD 30 MIN: CPT

## 2024-06-04 NOTE — CONSULT LETTER
[Dear  ___] : Dear  [unfilled], [Courtesy Letter:] : I had the pleasure of seeing your patient, [unfilled], in my office today. [Please see my note below.] : Please see my note below. [Sincerely,] : Sincerely, [FreeTextEntry2] : Adam Lawson MD ([place], [place]) [FreeTextEntry3] : Dhiraj Camarillo MD, FACS     [facility] Associate Chair    Department of Otolaryngology  Professor Otolaryngology & Molecular Medicine [facility]

## 2024-06-04 NOTE — HISTORY OF PRESENT ILLNESS
[de-identified] : 77yo male presents for post op appt s/p right tonsil biopsy showing Squamous cell carcinoma, predominantly non-keratinizing s/p PET 5/12/24  Denies pain, dysphagia, dysphonia dyspnea, hemoptysis, fevers  4/11/2024 MRI STN showing mixed solid/cystic right oral cavity/oropharyngeal mass, measuring up to 49mm.  -Enlarged 19mm right and 15mm left level 2A lymph nodes.-Asymmeytric right parotid gland which may represent parotitis. - multinodular thyroid with 23 mm right thyroid nodule. -large  right and moderate left mastoid effusions.   Denies cardiac or lung issues. not on blood thinners.

## 2024-06-04 NOTE — PHYSICAL EXAM
[Normal] : mucosa is normal [Midline] : trachea is located in midline position [Clear / Open well] : hypopharynx is clear and opens well [FreeTextEntry1] : firm mass R tonsil w extension to R lat BOT.

## 2024-06-10 ENCOUNTER — NON-APPOINTMENT (OUTPATIENT)
Age: 79
End: 2024-06-10

## 2024-06-10 PROCEDURE — 77301 RADIOTHERAPY DOSE PLAN IMRT: CPT | Mod: 26

## 2024-06-10 PROCEDURE — 77338 DESIGN MLC DEVICE FOR IMRT: CPT | Mod: 26

## 2024-06-10 PROCEDURE — 77300 RADIATION THERAPY DOSE PLAN: CPT | Mod: 26

## 2024-06-12 ENCOUNTER — APPOINTMENT (OUTPATIENT)
Dept: COLORECTAL SURGERY | Facility: CLINIC | Age: 79
End: 2024-06-12
Payer: MEDICARE

## 2024-06-12 ENCOUNTER — APPOINTMENT (OUTPATIENT)
Dept: COLORECTAL SURGERY | Facility: CLINIC | Age: 79
End: 2024-06-12

## 2024-06-12 VITALS
OXYGEN SATURATION: 96 % | DIASTOLIC BLOOD PRESSURE: 85 MMHG | SYSTOLIC BLOOD PRESSURE: 140 MMHG | HEIGHT: 71 IN | BODY MASS INDEX: 24.5 KG/M2 | WEIGHT: 175 LBS | TEMPERATURE: 98.2 F | HEART RATE: 90 BPM | RESPIRATION RATE: 16 BRPM

## 2024-06-12 PROCEDURE — 99203 OFFICE O/P NEW LOW 30 MIN: CPT

## 2024-06-12 RX ORDER — ALPRAZOLAM 0.25 MG/1
0.25 TABLET ORAL
Qty: 2 | Refills: 0 | Status: DISCONTINUED | COMMUNITY
Start: 2024-05-02 | End: 2024-06-12

## 2024-06-12 NOTE — HISTORY OF PRESENT ILLNESS
[FreeTextEntry1] : 79 y/o male with history of rectal cancer presents today for consultation.  He was previously seen in this office  and underwent abdominoperineal resection in July 2010 for carcinoma of the rectum (received neoadjuvant/adjuvant therapies).  Last seen in the office in 2019, and colonoscopy was done in July 2019.   He denies any weight loss, nausea, vomiting, bleeding and reports ostomy is functioning well.    Of note he was recently diagnosed with right tonsil SCC and is awaiting chemotherapy/RT.  PET/CT was done which noted a focus of increased uptake in the ascending colon (ie: just proximal to the hepatic flexure with SUV 8.2 and at the mid right colon with SUV 8.6).  Plan: Proceed with colonoscopy

## 2024-06-12 NOTE — REVIEW OF SYSTEMS
[As Noted in HPI] : as noted in HPI [Negative] : Heme/Lymph [Earache] : no earache [Loss Of Hearing] : no hearing loss [Nosebleeds] : no nosebleeds [Nasal Discharge] : no nasal discharge [Sore Throat] : no sore throat [Hoarseness] : no hoarseness [FreeTextEntry4] : recently diagnosed with right tonsil cancer- to start Chemo/RT

## 2024-06-17 ENCOUNTER — APPOINTMENT (OUTPATIENT)
Dept: OTOLARYNGOLOGY | Facility: CLINIC | Age: 79
End: 2024-06-17

## 2024-06-17 ENCOUNTER — APPOINTMENT (OUTPATIENT)
Dept: COLORECTAL SURGERY | Facility: CLINIC | Age: 79
End: 2024-06-17
Payer: MEDICARE

## 2024-06-17 PROCEDURE — 45385 COLONOSCOPY W/LESION REMOVAL: CPT

## 2024-06-20 LAB — CORE LAB BIOPSY: NORMAL

## 2024-06-24 ENCOUNTER — APPOINTMENT (OUTPATIENT)
Dept: OTOLARYNGOLOGY | Facility: CLINIC | Age: 79
End: 2024-06-24
Payer: MEDICARE

## 2024-06-24 PROCEDURE — 77387B: CUSTOM | Mod: 26

## 2024-06-24 PROCEDURE — 77427 RADIATION TX MANAGEMENT X5: CPT

## 2024-06-24 PROCEDURE — 92526 ORAL FUNCTION THERAPY: CPT | Mod: GN

## 2024-06-25 ENCOUNTER — NON-APPOINTMENT (OUTPATIENT)
Age: 79
End: 2024-06-25

## 2024-06-25 ENCOUNTER — APPOINTMENT (OUTPATIENT)
Dept: OTOLARYNGOLOGY | Facility: CLINIC | Age: 79
End: 2024-06-25

## 2024-06-25 VITALS
HEIGHT: 71 IN | HEART RATE: 86 BPM | TEMPERATURE: 96.98 F | SYSTOLIC BLOOD PRESSURE: 158 MMHG | DIASTOLIC BLOOD PRESSURE: 90 MMHG | WEIGHT: 170 LBS | RESPIRATION RATE: 16 BRPM | OXYGEN SATURATION: 97 % | BODY MASS INDEX: 23.8 KG/M2

## 2024-06-25 PROCEDURE — 77387B: CUSTOM | Mod: 26

## 2024-06-25 NOTE — HISTORY OF PRESENT ILLNESS
[FreeTextEntry1] : Mr. 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 He has a PMH of rectal cancer s/p CRT at ([facility] in [place]).  Treatment delay due to dental extraction. 6/25/2024 He presents for on treatment visit. Completed 2/35 Fx. Reviewed symptom management, oral care and skin care, Patient and wife verbalized understanding. Will start weekly Cisplatin on 6/28/2024.

## 2024-06-25 NOTE — PHYSICAL EXAM
[Normal] : no palpable adenopathy [Skin Color & Pigmentation] : normal skin color and pigmentation [] : no rash

## 2024-06-25 NOTE — DISEASE MANAGEMENT
[Clinical] : TNM Stage: c [II] : II [TTNM] : 2 [NTNM] : 2 [MTNM] : x [de-identified] : 400 [de-identified] : 7000cGy [de-identified] : Oropharynx/Neck

## 2024-06-26 PROCEDURE — 77387B: CUSTOM | Mod: 26

## 2024-06-27 ENCOUNTER — APPOINTMENT (OUTPATIENT)
Dept: INTERNAL MEDICINE | Facility: CLINIC | Age: 79
End: 2024-06-27
Payer: MEDICARE

## 2024-06-27 ENCOUNTER — TRANSCRIPTION ENCOUNTER (OUTPATIENT)
Age: 79
End: 2024-06-27

## 2024-06-27 VITALS
BODY MASS INDEX: 23.8 KG/M2 | RESPIRATION RATE: 16 BRPM | OXYGEN SATURATION: 98 % | WEIGHT: 170 LBS | SYSTOLIC BLOOD PRESSURE: 120 MMHG | HEIGHT: 71 IN | HEART RATE: 88 BPM | TEMPERATURE: 98.2 F | DIASTOLIC BLOOD PRESSURE: 78 MMHG

## 2024-06-27 VITALS — DIASTOLIC BLOOD PRESSURE: 70 MMHG | SYSTOLIC BLOOD PRESSURE: 112 MMHG

## 2024-06-27 DIAGNOSIS — Z87.891 PERSONAL HISTORY OF NICOTINE DEPENDENCE: ICD-10-CM

## 2024-06-27 DIAGNOSIS — I10 ESSENTIAL (PRIMARY) HYPERTENSION: ICD-10-CM

## 2024-06-27 PROCEDURE — 99204 OFFICE O/P NEW MOD 45 MIN: CPT

## 2024-06-27 PROCEDURE — G2211 COMPLEX E/M VISIT ADD ON: CPT

## 2024-06-27 PROCEDURE — 77387B: CUSTOM | Mod: 26

## 2024-06-28 ENCOUNTER — RESULT REVIEW (OUTPATIENT)
Age: 79
End: 2024-06-28

## 2024-06-28 ENCOUNTER — APPOINTMENT (OUTPATIENT)
Dept: HEMATOLOGY ONCOLOGY | Facility: CLINIC | Age: 79
End: 2024-06-28
Payer: MEDICARE

## 2024-06-28 ENCOUNTER — APPOINTMENT (OUTPATIENT)
Dept: HEMATOLOGY ONCOLOGY | Facility: CLINIC | Age: 79
End: 2024-06-28

## 2024-06-28 ENCOUNTER — APPOINTMENT (OUTPATIENT)
Dept: INFUSION THERAPY | Facility: HOSPITAL | Age: 79
End: 2024-06-28

## 2024-06-28 ENCOUNTER — NON-APPOINTMENT (OUTPATIENT)
Age: 79
End: 2024-06-28

## 2024-06-28 DIAGNOSIS — E78.5 HYPERLIPIDEMIA, UNSPECIFIED: ICD-10-CM

## 2024-06-28 DIAGNOSIS — C10.9 MALIGNANT NEOPLASM OF OROPHARYNX, UNSPECIFIED: ICD-10-CM

## 2024-06-28 LAB
ALBUMIN SERPL ELPH-MCNC: 4.4 G/DL — SIGNIFICANT CHANGE UP (ref 3.3–5)
ALP SERPL-CCNC: 79 U/L — SIGNIFICANT CHANGE UP (ref 40–120)
ALT FLD-CCNC: 9 U/L — LOW (ref 10–45)
ANION GAP SERPL CALC-SCNC: 13 MMOL/L — SIGNIFICANT CHANGE UP (ref 5–17)
AST SERPL-CCNC: 53 U/L — HIGH (ref 10–40)
BASOPHILS # BLD AUTO: 0.04 K/UL — SIGNIFICANT CHANGE UP (ref 0–0.2)
BASOPHILS # BLD AUTO: 0.04 K/UL — SIGNIFICANT CHANGE UP (ref 0–0.2)
BASOPHILS NFR BLD AUTO: 0.5 % — SIGNIFICANT CHANGE UP (ref 0–2)
BASOPHILS NFR BLD AUTO: 0.5 % — SIGNIFICANT CHANGE UP (ref 0–2)
BILIRUB SERPL-MCNC: 1.1 MG/DL — SIGNIFICANT CHANGE UP (ref 0.2–1.2)
BUN SERPL-MCNC: 15 MG/DL — SIGNIFICANT CHANGE UP (ref 7–23)
CALCIUM SERPL-MCNC: 10 MG/DL — SIGNIFICANT CHANGE UP (ref 8.4–10.5)
CHLORIDE SERPL-SCNC: 104 MMOL/L — SIGNIFICANT CHANGE UP (ref 96–108)
CO2 SERPL-SCNC: 23 MMOL/L — SIGNIFICANT CHANGE UP (ref 22–31)
CREAT SERPL-MCNC: 1.03 MG/DL — SIGNIFICANT CHANGE UP (ref 0.5–1.3)
EGFR: 75 ML/MIN/1.73M2 — SIGNIFICANT CHANGE UP
EGFR: 75 ML/MIN/1.73M2 — SIGNIFICANT CHANGE UP
EOSINOPHIL # BLD AUTO: 0.04 K/UL — SIGNIFICANT CHANGE UP (ref 0–0.5)
EOSINOPHIL # BLD AUTO: 0.06 K/UL — SIGNIFICANT CHANGE UP (ref 0–0.5)
EOSINOPHIL NFR BLD AUTO: 0.5 % — SIGNIFICANT CHANGE UP (ref 0–6)
EOSINOPHIL NFR BLD AUTO: 0.7 % — SIGNIFICANT CHANGE UP (ref 0–6)
GLUCOSE SERPL-MCNC: 92 MG/DL — SIGNIFICANT CHANGE UP (ref 70–99)
HCT VFR BLD CALC: 46.9 % — SIGNIFICANT CHANGE UP (ref 39–50)
HCT VFR BLD CALC: 49 % — SIGNIFICANT CHANGE UP (ref 39–50)
HGB BLD-MCNC: 16.2 G/DL — SIGNIFICANT CHANGE UP (ref 13–17)
HGB BLD-MCNC: 16.4 G/DL — SIGNIFICANT CHANGE UP (ref 13–17)
IMM GRANULOCYTES NFR BLD AUTO: 0.4 % — SIGNIFICANT CHANGE UP (ref 0–0.9)
IMM GRANULOCYTES NFR BLD AUTO: 0.6 % — SIGNIFICANT CHANGE UP (ref 0–0.9)
LYMPHOCYTES # BLD AUTO: 0.89 K/UL — LOW (ref 1–3.3)
LYMPHOCYTES # BLD AUTO: 1.1 K/UL — SIGNIFICANT CHANGE UP (ref 1–3.3)
LYMPHOCYTES # BLD AUTO: 11.1 % — LOW (ref 13–44)
LYMPHOCYTES # BLD AUTO: 12.9 % — LOW (ref 13–44)
MAGNESIUM SERPL-MCNC: 2.2 MG/DL — SIGNIFICANT CHANGE UP (ref 1.6–2.6)
MCHC RBC-ENTMCNC: 31 PG — SIGNIFICANT CHANGE UP (ref 27–34)
MCHC RBC-ENTMCNC: 31.1 PG — SIGNIFICANT CHANGE UP (ref 27–34)
MCHC RBC-ENTMCNC: 33.5 G/DL — SIGNIFICANT CHANGE UP (ref 32–36)
MCHC RBC-ENTMCNC: 34.5 G/DL — SIGNIFICANT CHANGE UP (ref 32–36)
MCV RBC AUTO: 89.7 FL — SIGNIFICANT CHANGE UP (ref 80–100)
MCV RBC AUTO: 92.8 FL — SIGNIFICANT CHANGE UP (ref 80–100)
MONOCYTES # BLD AUTO: 0.51 K/UL — SIGNIFICANT CHANGE UP (ref 0–0.9)
MONOCYTES # BLD AUTO: 0.55 K/UL — SIGNIFICANT CHANGE UP (ref 0–0.9)
MONOCYTES NFR BLD AUTO: 6.4 % — SIGNIFICANT CHANGE UP (ref 2–14)
MONOCYTES NFR BLD AUTO: 6.5 % — SIGNIFICANT CHANGE UP (ref 2–14)
NEUTROPHILS # BLD AUTO: 6.51 K/UL — SIGNIFICANT CHANGE UP (ref 1.8–7.4)
NEUTROPHILS # BLD AUTO: 6.71 K/UL — SIGNIFICANT CHANGE UP (ref 1.8–7.4)
NEUTROPHILS NFR BLD AUTO: 78.8 % — HIGH (ref 43–77)
NEUTROPHILS NFR BLD AUTO: 81.1 % — HIGH (ref 43–77)
NRBC # BLD: 0 /100 WBCS — SIGNIFICANT CHANGE UP (ref 0–0)
NRBC # BLD: 0 /100 WBCS — SIGNIFICANT CHANGE UP (ref 0–0)
NRBC BLD-RTO: 0 /100 WBCS — SIGNIFICANT CHANGE UP (ref 0–0)
NRBC BLD-RTO: 0 /100 WBCS — SIGNIFICANT CHANGE UP (ref 0–0)
PLATELET # BLD AUTO: 271 K/UL — SIGNIFICANT CHANGE UP (ref 150–400)
PLATELET # BLD AUTO: 282 K/UL — SIGNIFICANT CHANGE UP (ref 150–400)
POTASSIUM SERPL-MCNC: 4.5 MMOL/L — SIGNIFICANT CHANGE UP (ref 3.5–5.3)
POTASSIUM SERPL-SCNC: 4.5 MMOL/L — SIGNIFICANT CHANGE UP (ref 3.5–5.3)
PROT SERPL-MCNC: 7.5 G/DL — SIGNIFICANT CHANGE UP (ref 6–8.3)
RBC # BLD: 5.23 M/UL — SIGNIFICANT CHANGE UP (ref 4.2–5.8)
RBC # BLD: 5.28 M/UL — SIGNIFICANT CHANGE UP (ref 4.2–5.8)
RBC # FLD: 13.4 % — SIGNIFICANT CHANGE UP (ref 10.3–14.5)
RBC # FLD: 13.6 % — SIGNIFICANT CHANGE UP (ref 10.3–14.5)
SODIUM SERPL-SCNC: 140 MMOL/L — SIGNIFICANT CHANGE UP (ref 135–145)
WBC # BLD: 8.02 K/UL — SIGNIFICANT CHANGE UP (ref 3.8–10.5)
WBC # BLD: 8.51 K/UL — SIGNIFICANT CHANGE UP (ref 3.8–10.5)
WBC # FLD AUTO: 8.02 K/UL — SIGNIFICANT CHANGE UP (ref 3.8–10.5)
WBC # FLD AUTO: 8.51 K/UL — SIGNIFICANT CHANGE UP (ref 3.8–10.5)

## 2024-06-28 PROCEDURE — 99214 OFFICE O/P EST MOD 30 MIN: CPT

## 2024-06-28 PROCEDURE — 77387B: CUSTOM | Mod: 26

## 2024-06-28 RX ORDER — NIFEDIPINE 30 MG
1 TABLET, EXTENDED RELEASE 24 HR ORAL
Refills: 0 | DISCHARGE

## 2024-06-29 ENCOUNTER — APPOINTMENT (OUTPATIENT)
Dept: INFUSION THERAPY | Facility: HOSPITAL | Age: 79
End: 2024-06-29

## 2024-06-29 LAB
HBV CORE AB SER-ACNC: SIGNIFICANT CHANGE UP
HBV SURFACE AB SER-ACNC: SIGNIFICANT CHANGE UP
HBV SURFACE AG SER-ACNC: SIGNIFICANT CHANGE UP
HCV AB S/CO SERPL IA: 0.1 S/CO — SIGNIFICANT CHANGE UP (ref 0–0.99)
HCV AB SERPL-IMP: SIGNIFICANT CHANGE UP
T4 FREE SERPL-MCNC: 1.8 NG/DL — SIGNIFICANT CHANGE UP (ref 0.9–1.8)
T4 FREE+ TSH PNL SERPL: 0.1 UIU/ML — LOW (ref 0.27–4.2)

## 2024-06-30 ENCOUNTER — NON-APPOINTMENT (OUTPATIENT)
Age: 79
End: 2024-06-30

## 2024-07-01 ENCOUNTER — APPOINTMENT (OUTPATIENT)
Dept: OTOLARYNGOLOGY | Facility: CLINIC | Age: 79
End: 2024-07-01

## 2024-07-01 DIAGNOSIS — R11.2 NAUSEA WITH VOMITING, UNSPECIFIED: ICD-10-CM

## 2024-07-01 DIAGNOSIS — C76.0 MALIGNANT NEOPLASM OF HEAD, FACE AND NECK: ICD-10-CM

## 2024-07-01 DIAGNOSIS — Z51.11 ENCOUNTER FOR ANTINEOPLASTIC CHEMOTHERAPY: ICD-10-CM

## 2024-07-01 DIAGNOSIS — E86.0 DEHYDRATION: ICD-10-CM

## 2024-07-01 PROCEDURE — 77427 RADIATION TX MANAGEMENT X5: CPT

## 2024-07-01 PROCEDURE — 77387B: CUSTOM | Mod: 26

## 2024-07-02 ENCOUNTER — NON-APPOINTMENT (OUTPATIENT)
Age: 79
End: 2024-07-02

## 2024-07-02 PROCEDURE — 77387B: CUSTOM | Mod: 26

## 2024-07-03 ENCOUNTER — NON-APPOINTMENT (OUTPATIENT)
Age: 79
End: 2024-07-03

## 2024-07-03 PROCEDURE — 77387B: CUSTOM | Mod: 26

## 2024-07-05 ENCOUNTER — RESULT REVIEW (OUTPATIENT)
Age: 79
End: 2024-07-05

## 2024-07-05 ENCOUNTER — APPOINTMENT (OUTPATIENT)
Dept: HEMATOLOGY ONCOLOGY | Facility: CLINIC | Age: 79
End: 2024-07-05
Payer: MEDICARE

## 2024-07-05 ENCOUNTER — NON-APPOINTMENT (OUTPATIENT)
Age: 79
End: 2024-07-05

## 2024-07-05 ENCOUNTER — APPOINTMENT (OUTPATIENT)
Dept: INFUSION THERAPY | Facility: HOSPITAL | Age: 79
End: 2024-07-05

## 2024-07-05 ENCOUNTER — APPOINTMENT (OUTPATIENT)
Dept: HEMATOLOGY ONCOLOGY | Facility: CLINIC | Age: 79
End: 2024-07-05

## 2024-07-05 VITALS
WEIGHT: 161.6 LBS | OXYGEN SATURATION: 98 % | TEMPERATURE: 97 F | HEIGHT: 71 IN | SYSTOLIC BLOOD PRESSURE: 119 MMHG | DIASTOLIC BLOOD PRESSURE: 77 MMHG | HEART RATE: 81 BPM | BODY MASS INDEX: 22.62 KG/M2 | RESPIRATION RATE: 18 BRPM

## 2024-07-05 DIAGNOSIS — R43.2 PARAGEUSIA: ICD-10-CM

## 2024-07-05 LAB
ALBUMIN SERPL ELPH-MCNC: 4.3 G/DL — SIGNIFICANT CHANGE UP (ref 3.3–5)
ALP SERPL-CCNC: 72 U/L — SIGNIFICANT CHANGE UP (ref 40–120)
ALT FLD-CCNC: 19 U/L — SIGNIFICANT CHANGE UP (ref 10–45)
ANION GAP SERPL CALC-SCNC: 16 MMOL/L — SIGNIFICANT CHANGE UP (ref 5–17)
AST SERPL-CCNC: 56 U/L — HIGH (ref 10–40)
BASOPHILS # BLD AUTO: 0.03 K/UL — SIGNIFICANT CHANGE UP (ref 0–0.2)
BASOPHILS NFR BLD AUTO: 0.4 % — SIGNIFICANT CHANGE UP (ref 0–2)
BILIRUB SERPL-MCNC: 0.8 MG/DL — SIGNIFICANT CHANGE UP (ref 0.2–1.2)
BUN SERPL-MCNC: 43 MG/DL — HIGH (ref 7–23)
CALCIUM SERPL-MCNC: 9.7 MG/DL — SIGNIFICANT CHANGE UP (ref 8.4–10.5)
CHLORIDE SERPL-SCNC: 100 MMOL/L — SIGNIFICANT CHANGE UP (ref 96–108)
CO2 SERPL-SCNC: 20 MMOL/L — LOW (ref 22–31)
CREAT SERPL-MCNC: 1.83 MG/DL — HIGH (ref 0.5–1.3)
EGFR: 38 ML/MIN/1.73M2 — LOW
EGFR: 38 ML/MIN/1.73M2 — LOW
EOSINOPHIL # BLD AUTO: 0.09 K/UL — SIGNIFICANT CHANGE UP (ref 0–0.5)
EOSINOPHIL NFR BLD AUTO: 1.2 % — SIGNIFICANT CHANGE UP (ref 0–6)
GLUCOSE SERPL-MCNC: 96 MG/DL — SIGNIFICANT CHANGE UP (ref 70–99)
HCT VFR BLD CALC: 46.6 % — SIGNIFICANT CHANGE UP (ref 39–50)
HGB BLD-MCNC: 16.4 G/DL — SIGNIFICANT CHANGE UP (ref 13–17)
IMM GRANULOCYTES NFR BLD AUTO: 0.3 % — SIGNIFICANT CHANGE UP (ref 0–0.9)
LYMPHOCYTES # BLD AUTO: 1.09 K/UL — SIGNIFICANT CHANGE UP (ref 1–3.3)
LYMPHOCYTES # BLD AUTO: 14.9 % — SIGNIFICANT CHANGE UP (ref 13–44)
MAGNESIUM SERPL-MCNC: 2.3 MG/DL — SIGNIFICANT CHANGE UP (ref 1.6–2.6)
MCHC RBC-ENTMCNC: 30.9 PG — SIGNIFICANT CHANGE UP (ref 27–34)
MCHC RBC-ENTMCNC: 35.2 G/DL — SIGNIFICANT CHANGE UP (ref 32–36)
MCV RBC AUTO: 87.8 FL — SIGNIFICANT CHANGE UP (ref 80–100)
MONOCYTES # BLD AUTO: 0.75 K/UL — SIGNIFICANT CHANGE UP (ref 0–0.9)
MONOCYTES NFR BLD AUTO: 10.3 % — SIGNIFICANT CHANGE UP (ref 2–14)
NEUTROPHILS # BLD AUTO: 5.32 K/UL — SIGNIFICANT CHANGE UP (ref 1.8–7.4)
NEUTROPHILS NFR BLD AUTO: 72.9 % — SIGNIFICANT CHANGE UP (ref 43–77)
NRBC # BLD: 0 /100 WBCS — SIGNIFICANT CHANGE UP (ref 0–0)
NRBC BLD-RTO: 0 /100 WBCS — SIGNIFICANT CHANGE UP (ref 0–0)
PLATELET # BLD AUTO: 276 K/UL — SIGNIFICANT CHANGE UP (ref 150–400)
POTASSIUM SERPL-MCNC: 3.5 MMOL/L — SIGNIFICANT CHANGE UP (ref 3.5–5.3)
POTASSIUM SERPL-SCNC: 3.5 MMOL/L — SIGNIFICANT CHANGE UP (ref 3.5–5.3)
PROT SERPL-MCNC: 7.3 G/DL — SIGNIFICANT CHANGE UP (ref 6–8.3)
RBC # BLD: 5.31 M/UL — SIGNIFICANT CHANGE UP (ref 4.2–5.8)
RBC # FLD: 12.5 % — SIGNIFICANT CHANGE UP (ref 10.3–14.5)
SODIUM SERPL-SCNC: 136 MMOL/L — SIGNIFICANT CHANGE UP (ref 135–145)
T4 FREE SERPL-MCNC: 2.2 NG/DL — HIGH (ref 0.9–1.8)
T4 FREE+ TSH PNL SERPL: 0.05 UIU/ML — LOW (ref 0.27–4.2)
WBC # BLD: 7.3 K/UL — SIGNIFICANT CHANGE UP (ref 3.8–10.5)
WBC # FLD AUTO: 7.3 K/UL — SIGNIFICANT CHANGE UP (ref 3.8–10.5)

## 2024-07-05 PROCEDURE — 99214 OFFICE O/P EST MOD 30 MIN: CPT

## 2024-07-05 PROCEDURE — 77014: CPT | Mod: 26

## 2024-07-06 ENCOUNTER — APPOINTMENT (OUTPATIENT)
Dept: INFUSION THERAPY | Facility: HOSPITAL | Age: 79
End: 2024-07-06

## 2024-07-08 PROCEDURE — 77387B: CUSTOM | Mod: 26

## 2024-07-09 ENCOUNTER — NON-APPOINTMENT (OUTPATIENT)
Age: 79
End: 2024-07-09

## 2024-07-09 PROCEDURE — 77387B: CUSTOM | Mod: 26

## 2024-07-09 PROCEDURE — 77427 RADIATION TX MANAGEMENT X5: CPT

## 2024-07-10 ENCOUNTER — NON-APPOINTMENT (OUTPATIENT)
Age: 79
End: 2024-07-10

## 2024-07-10 VITALS
HEART RATE: 67 BPM | TEMPERATURE: 97.3 F | RESPIRATION RATE: 18 BRPM | OXYGEN SATURATION: 98 % | WEIGHT: 157.3 LBS | BODY MASS INDEX: 22.02 KG/M2 | SYSTOLIC BLOOD PRESSURE: 118 MMHG | HEIGHT: 71 IN | DIASTOLIC BLOOD PRESSURE: 71 MMHG

## 2024-07-10 PROCEDURE — 77387B: CUSTOM | Mod: 26

## 2024-07-10 RX ORDER — DIPHENHYDRAMINE HYDROCHLORIDE AND LIDOCAINE HYDROCHLORIDE AND ALUMINUM HYDROXIDE AND MAGNESIUM HYDRO
KIT
Qty: 900 | Refills: 0 | Status: ACTIVE | COMMUNITY
Start: 2024-07-10 | End: 1900-01-01

## 2024-07-11 PROCEDURE — 77387B: CUSTOM | Mod: 26

## 2024-07-12 ENCOUNTER — RESULT REVIEW (OUTPATIENT)
Age: 79
End: 2024-07-12

## 2024-07-12 ENCOUNTER — NON-APPOINTMENT (OUTPATIENT)
Age: 79
End: 2024-07-12

## 2024-07-12 ENCOUNTER — APPOINTMENT (OUTPATIENT)
Dept: HEMATOLOGY ONCOLOGY | Facility: CLINIC | Age: 79
End: 2024-07-12

## 2024-07-12 ENCOUNTER — APPOINTMENT (OUTPATIENT)
Dept: INFUSION THERAPY | Facility: HOSPITAL | Age: 79
End: 2024-07-12

## 2024-07-12 LAB
ALBUMIN SERPL ELPH-MCNC: 4.1 G/DL — SIGNIFICANT CHANGE UP (ref 3.3–5)
ALP SERPL-CCNC: 63 U/L — SIGNIFICANT CHANGE UP (ref 40–120)
ALT FLD-CCNC: 12 U/L — SIGNIFICANT CHANGE UP (ref 10–45)
ANION GAP SERPL CALC-SCNC: 14 MMOL/L — SIGNIFICANT CHANGE UP (ref 5–17)
AST SERPL-CCNC: 48 U/L — HIGH (ref 10–40)
BASOPHILS # BLD AUTO: 0.02 K/UL — SIGNIFICANT CHANGE UP (ref 0–0.2)
BASOPHILS # BLD AUTO: 0.04 K/UL — SIGNIFICANT CHANGE UP (ref 0–0.2)
BASOPHILS NFR BLD AUTO: 0.3 % — SIGNIFICANT CHANGE UP (ref 0–2)
BASOPHILS NFR BLD AUTO: 0.5 % — SIGNIFICANT CHANGE UP (ref 0–2)
BILIRUB SERPL-MCNC: 0.6 MG/DL — SIGNIFICANT CHANGE UP (ref 0.2–1.2)
BUN SERPL-MCNC: 30 MG/DL — HIGH (ref 7–23)
CALCIUM SERPL-MCNC: 9.5 MG/DL — SIGNIFICANT CHANGE UP (ref 8.4–10.5)
CHLORIDE SERPL-SCNC: 105 MMOL/L — SIGNIFICANT CHANGE UP (ref 96–108)
CO2 SERPL-SCNC: 21 MMOL/L — LOW (ref 22–31)
CREAT SERPL-MCNC: 1.41 MG/DL — HIGH (ref 0.5–1.3)
EGFR: 52 ML/MIN/1.73M2 — LOW
EGFR: 52 ML/MIN/1.73M2 — LOW
EOSINOPHIL # BLD AUTO: 0.05 K/UL — SIGNIFICANT CHANGE UP (ref 0–0.5)
EOSINOPHIL # BLD AUTO: 0.06 K/UL — SIGNIFICANT CHANGE UP (ref 0–0.5)
EOSINOPHIL NFR BLD AUTO: 0.7 % — SIGNIFICANT CHANGE UP (ref 0–6)
EOSINOPHIL NFR BLD AUTO: 0.7 % — SIGNIFICANT CHANGE UP (ref 0–6)
GLUCOSE SERPL-MCNC: 107 MG/DL — HIGH (ref 70–99)
HCT VFR BLD CALC: 42.8 % — SIGNIFICANT CHANGE UP (ref 39–50)
HCT VFR BLD CALC: 43.5 % — SIGNIFICANT CHANGE UP (ref 39–50)
HGB BLD-MCNC: 14.9 G/DL — SIGNIFICANT CHANGE UP (ref 13–17)
HGB BLD-MCNC: 14.9 G/DL — SIGNIFICANT CHANGE UP (ref 13–17)
IMM GRANULOCYTES NFR BLD AUTO: 0.3 % — SIGNIFICANT CHANGE UP (ref 0–0.9)
IMM GRANULOCYTES NFR BLD AUTO: 0.4 % — SIGNIFICANT CHANGE UP (ref 0–0.9)
LYMPHOCYTES # BLD AUTO: 0.41 K/UL — LOW (ref 1–3.3)
LYMPHOCYTES # BLD AUTO: 0.52 K/UL — LOW (ref 1–3.3)
LYMPHOCYTES # BLD AUTO: 5.8 % — LOW (ref 13–44)
LYMPHOCYTES # BLD AUTO: 6.4 % — LOW (ref 13–44)
MAGNESIUM SERPL-MCNC: 2 MG/DL — SIGNIFICANT CHANGE UP (ref 1.6–2.6)
MCHC RBC-ENTMCNC: 31 PG — SIGNIFICANT CHANGE UP (ref 27–34)
MCHC RBC-ENTMCNC: 31.4 PG — SIGNIFICANT CHANGE UP (ref 27–34)
MCHC RBC-ENTMCNC: 34.3 G/DL — SIGNIFICANT CHANGE UP (ref 32–36)
MCHC RBC-ENTMCNC: 34.8 G/DL — SIGNIFICANT CHANGE UP (ref 32–36)
MCV RBC AUTO: 90.1 FL — SIGNIFICANT CHANGE UP (ref 80–100)
MCV RBC AUTO: 90.4 FL — SIGNIFICANT CHANGE UP (ref 80–100)
MONOCYTES # BLD AUTO: 0.55 K/UL — SIGNIFICANT CHANGE UP (ref 0–0.9)
MONOCYTES # BLD AUTO: 0.61 K/UL — SIGNIFICANT CHANGE UP (ref 0–0.9)
MONOCYTES NFR BLD AUTO: 7.5 % — SIGNIFICANT CHANGE UP (ref 2–14)
MONOCYTES NFR BLD AUTO: 7.8 % — SIGNIFICANT CHANGE UP (ref 2–14)
NEUTROPHILS # BLD AUTO: 5.98 K/UL — SIGNIFICANT CHANGE UP (ref 1.8–7.4)
NEUTROPHILS # BLD AUTO: 6.83 K/UL — SIGNIFICANT CHANGE UP (ref 1.8–7.4)
NEUTROPHILS NFR BLD AUTO: 84.5 % — HIGH (ref 43–77)
NEUTROPHILS NFR BLD AUTO: 85.1 % — HIGH (ref 43–77)
NRBC # BLD: 0 /100 WBCS — SIGNIFICANT CHANGE UP (ref 0–0)
NRBC # BLD: 0 /100 WBCS — SIGNIFICANT CHANGE UP (ref 0–0)
NRBC BLD-RTO: 0 /100 WBCS — SIGNIFICANT CHANGE UP (ref 0–0)
NRBC BLD-RTO: 0 /100 WBCS — SIGNIFICANT CHANGE UP (ref 0–0)
PLATELET # BLD AUTO: 179 K/UL — SIGNIFICANT CHANGE UP (ref 150–400)
PLATELET # BLD AUTO: 181 K/UL — SIGNIFICANT CHANGE UP (ref 150–400)
POTASSIUM SERPL-MCNC: 3.4 MMOL/L — LOW (ref 3.5–5.3)
POTASSIUM SERPL-SCNC: 3.4 MMOL/L — LOW (ref 3.5–5.3)
PROT SERPL-MCNC: 7 G/DL — SIGNIFICANT CHANGE UP (ref 6–8.3)
RBC # BLD: 4.75 M/UL — SIGNIFICANT CHANGE UP (ref 4.2–5.8)
RBC # BLD: 4.81 M/UL — SIGNIFICANT CHANGE UP (ref 4.2–5.8)
RBC # FLD: 12.8 % — SIGNIFICANT CHANGE UP (ref 10.3–14.5)
RBC # FLD: 12.9 % — SIGNIFICANT CHANGE UP (ref 10.3–14.5)
SODIUM SERPL-SCNC: 140 MMOL/L — SIGNIFICANT CHANGE UP (ref 135–145)
WBC # BLD: 7.03 K/UL — SIGNIFICANT CHANGE UP (ref 3.8–10.5)
WBC # BLD: 8.09 K/UL — SIGNIFICANT CHANGE UP (ref 3.8–10.5)
WBC # FLD AUTO: 7.03 K/UL — SIGNIFICANT CHANGE UP (ref 3.8–10.5)
WBC # FLD AUTO: 8.09 K/UL — SIGNIFICANT CHANGE UP (ref 3.8–10.5)

## 2024-07-12 PROCEDURE — 77014: CPT | Mod: 26

## 2024-07-13 ENCOUNTER — APPOINTMENT (OUTPATIENT)
Dept: INFUSION THERAPY | Facility: HOSPITAL | Age: 79
End: 2024-07-13

## 2024-07-13 LAB
T4 FREE SERPL-MCNC: 1.9 NG/DL — HIGH (ref 0.9–1.8)
T4 FREE+ TSH PNL SERPL: 0.02 UIU/ML — LOW (ref 0.27–4.2)

## 2024-07-14 ENCOUNTER — OUTPATIENT (OUTPATIENT)
Dept: OUTPATIENT SERVICES | Facility: HOSPITAL | Age: 79
LOS: 1 days | Discharge: HOME | End: 2024-07-14

## 2024-07-14 DIAGNOSIS — Z92.21 PERSONAL HISTORY OF ANTINEOPLASTIC CHEMOTHERAPY: Chronic | ICD-10-CM

## 2024-07-14 DIAGNOSIS — Z93.3 COLOSTOMY STATUS: Chronic | ICD-10-CM

## 2024-07-14 DIAGNOSIS — C10.9 MALIGNANT NEOPLASM OF OROPHARYNX, UNSPECIFIED: ICD-10-CM

## 2024-07-15 PROCEDURE — 77387B: CUSTOM | Mod: 26

## 2024-07-16 ENCOUNTER — NON-APPOINTMENT (OUTPATIENT)
Age: 79
End: 2024-07-16

## 2024-07-16 VITALS
OXYGEN SATURATION: 98 % | TEMPERATURE: 98 F | DIASTOLIC BLOOD PRESSURE: 84 MMHG | RESPIRATION RATE: 16 BRPM | BODY MASS INDEX: 21.48 KG/M2 | SYSTOLIC BLOOD PRESSURE: 139 MMHG | WEIGHT: 154 LBS | HEART RATE: 79 BPM

## 2024-07-16 PROCEDURE — 77427 RADIATION TX MANAGEMENT X5: CPT

## 2024-07-16 PROCEDURE — 77387B: CUSTOM | Mod: 26

## 2024-07-16 RX ORDER — GUAIFENESIN 100 MG/5ML
100 LIQUID ORAL TWICE DAILY
Qty: 600 | Refills: 3 | Status: ACTIVE | COMMUNITY
Start: 2024-07-16 | End: 1900-01-01

## 2024-07-17 PROCEDURE — 77387B: CUSTOM | Mod: 26

## 2024-07-18 PROCEDURE — 77387B: CUSTOM | Mod: 26

## 2024-07-19 ENCOUNTER — APPOINTMENT (OUTPATIENT)
Dept: HEMATOLOGY ONCOLOGY | Facility: CLINIC | Age: 79
End: 2024-07-19

## 2024-07-19 ENCOUNTER — RESULT REVIEW (OUTPATIENT)
Age: 79
End: 2024-07-19

## 2024-07-19 ENCOUNTER — APPOINTMENT (OUTPATIENT)
Dept: HEMATOLOGY ONCOLOGY | Facility: CLINIC | Age: 79
End: 2024-07-19
Payer: MEDICARE

## 2024-07-19 ENCOUNTER — APPOINTMENT (OUTPATIENT)
Dept: INFUSION THERAPY | Facility: HOSPITAL | Age: 79
End: 2024-07-19

## 2024-07-19 DIAGNOSIS — N17.9 ACUTE KIDNEY FAILURE, UNSPECIFIED: ICD-10-CM

## 2024-07-19 LAB
ALBUMIN SERPL ELPH-MCNC: 3.9 G/DL — SIGNIFICANT CHANGE UP (ref 3.3–5)
ALP SERPL-CCNC: 65 U/L — SIGNIFICANT CHANGE UP (ref 40–120)
ALT FLD-CCNC: 30 U/L — SIGNIFICANT CHANGE UP (ref 10–45)
ANION GAP SERPL CALC-SCNC: 9 MMOL/L — SIGNIFICANT CHANGE UP (ref 5–17)
AST SERPL-CCNC: 55 U/L — HIGH (ref 10–40)
BASOPHILS # BLD AUTO: 0.04 K/UL — SIGNIFICANT CHANGE UP (ref 0–0.2)
BASOPHILS NFR BLD AUTO: 0.8 % — SIGNIFICANT CHANGE UP (ref 0–2)
BILIRUB SERPL-MCNC: 0.4 MG/DL — SIGNIFICANT CHANGE UP (ref 0.2–1.2)
BUN SERPL-MCNC: 27 MG/DL — HIGH (ref 7–23)
CALCIUM SERPL-MCNC: 9.7 MG/DL — SIGNIFICANT CHANGE UP (ref 8.4–10.5)
CHLORIDE SERPL-SCNC: 106 MMOL/L — SIGNIFICANT CHANGE UP (ref 96–108)
CO2 SERPL-SCNC: 24 MMOL/L — SIGNIFICANT CHANGE UP (ref 22–31)
CREAT SERPL-MCNC: 1.28 MG/DL — SIGNIFICANT CHANGE UP (ref 0.5–1.3)
EGFR: 58 ML/MIN/1.73M2 — LOW
EOSINOPHIL # BLD AUTO: 0.08 K/UL — SIGNIFICANT CHANGE UP (ref 0–0.5)
EOSINOPHIL NFR BLD AUTO: 1.5 % — SIGNIFICANT CHANGE UP (ref 0–6)
GLUCOSE SERPL-MCNC: 153 MG/DL — HIGH (ref 70–99)
HCT VFR BLD CALC: 41.8 % — SIGNIFICANT CHANGE UP (ref 39–50)
HGB BLD-MCNC: 14.7 G/DL — SIGNIFICANT CHANGE UP (ref 13–17)
IMM GRANULOCYTES NFR BLD AUTO: 0.4 % — SIGNIFICANT CHANGE UP (ref 0–0.9)
LYMPHOCYTES # BLD AUTO: 0.47 K/UL — LOW (ref 1–3.3)
LYMPHOCYTES # BLD AUTO: 8.8 % — LOW (ref 13–44)
MAGNESIUM SERPL-MCNC: 1.8 MG/DL — SIGNIFICANT CHANGE UP (ref 1.6–2.6)
MCHC RBC-ENTMCNC: 30.9 PG — SIGNIFICANT CHANGE UP (ref 27–34)
MCHC RBC-ENTMCNC: 35.2 G/DL — SIGNIFICANT CHANGE UP (ref 32–36)
MCV RBC AUTO: 88 FL — SIGNIFICANT CHANGE UP (ref 80–100)
MONOCYTES # BLD AUTO: 0.43 K/UL — SIGNIFICANT CHANGE UP (ref 0–0.9)
MONOCYTES NFR BLD AUTO: 8.1 % — SIGNIFICANT CHANGE UP (ref 2–14)
NEUTROPHILS # BLD AUTO: 4.29 K/UL — SIGNIFICANT CHANGE UP (ref 1.8–7.4)
NEUTROPHILS NFR BLD AUTO: 80.4 % — HIGH (ref 43–77)
NRBC # BLD: 0 /100 WBCS — SIGNIFICANT CHANGE UP (ref 0–0)
PLATELET # BLD AUTO: 199 K/UL — SIGNIFICANT CHANGE UP (ref 150–400)
POTASSIUM SERPL-MCNC: 3.9 MMOL/L — SIGNIFICANT CHANGE UP (ref 3.5–5.3)
POTASSIUM SERPL-SCNC: 3.9 MMOL/L — SIGNIFICANT CHANGE UP (ref 3.5–5.3)
PROT SERPL-MCNC: 6.5 G/DL — SIGNIFICANT CHANGE UP (ref 6–8.3)
RBC # BLD: 4.75 M/UL — SIGNIFICANT CHANGE UP (ref 4.2–5.8)
RBC # FLD: 12.9 % — SIGNIFICANT CHANGE UP (ref 10.3–14.5)
SODIUM SERPL-SCNC: 140 MMOL/L — SIGNIFICANT CHANGE UP (ref 135–145)
WBC # BLD: 5.33 K/UL — SIGNIFICANT CHANGE UP (ref 3.8–10.5)
WBC # FLD AUTO: 5.33 K/UL — SIGNIFICANT CHANGE UP (ref 3.8–10.5)

## 2024-07-19 PROCEDURE — 77014: CPT | Mod: 26

## 2024-07-19 PROCEDURE — 99214 OFFICE O/P EST MOD 30 MIN: CPT

## 2024-07-19 PROCEDURE — G2211 COMPLEX E/M VISIT ADD ON: CPT

## 2024-07-20 ENCOUNTER — APPOINTMENT (OUTPATIENT)
Dept: INFUSION THERAPY | Facility: HOSPITAL | Age: 79
End: 2024-07-20

## 2024-07-20 LAB
T4 FREE SERPL-MCNC: 1.8 NG/DL — SIGNIFICANT CHANGE UP (ref 0.9–1.8)
T4 FREE+ TSH PNL SERPL: 0.05 UIU/ML — LOW (ref 0.27–4.2)

## 2024-07-22 DIAGNOSIS — R11.2 NAUSEA WITH VOMITING, UNSPECIFIED: ICD-10-CM

## 2024-07-22 DIAGNOSIS — Z51.11 ENCOUNTER FOR ANTINEOPLASTIC CHEMOTHERAPY: ICD-10-CM

## 2024-07-22 DIAGNOSIS — E86.0 DEHYDRATION: ICD-10-CM

## 2024-07-22 PROCEDURE — 77387B: CUSTOM | Mod: 26

## 2024-07-23 ENCOUNTER — NON-APPOINTMENT (OUTPATIENT)
Age: 79
End: 2024-07-23

## 2024-07-23 VITALS
RESPIRATION RATE: 17 BRPM | OXYGEN SATURATION: 99 % | BODY MASS INDEX: 21.59 KG/M2 | DIASTOLIC BLOOD PRESSURE: 77 MMHG | WEIGHT: 154.21 LBS | SYSTOLIC BLOOD PRESSURE: 127 MMHG | HEIGHT: 71 IN | HEART RATE: 64 BPM | TEMPERATURE: 97.7 F

## 2024-07-23 LAB
CHOLEST SERPL-MCNC: 168 MG/DL — SIGNIFICANT CHANGE UP
HDLC SERPL-MCNC: 37 MG/DL — LOW
LIPID PNL WITH DIRECT LDL SERPL: 110 MG/DL — HIGH
NON HDL CHOLESTEROL: 131 MG/DL — HIGH
TRIGL SERPL-MCNC: 115 MG/DL — SIGNIFICANT CHANGE UP

## 2024-07-23 PROCEDURE — 77387B: CUSTOM | Mod: 26

## 2024-07-23 PROCEDURE — 77427 RADIATION TX MANAGEMENT X5: CPT

## 2024-07-23 NOTE — DISEASE MANAGEMENT
[Clinical] : TNM Stage: c [II] : II [TTNM] : 2 [NTNM] : 2 [MTNM] : x [de-identified] : 7072 [de-identified] : 7000cGy [de-identified] : Oropharynx/Neck

## 2024-07-23 NOTE — PHYSICAL EXAM
[Normal] : no palpable adenopathy [] : no rash [de-identified] : Mild hyperpigmentation and erythema of bilateral necks

## 2024-07-23 NOTE — REVIEW OF SYSTEMS
[Mucositis Oral: Grade 1 - Asymptomatic or mild symptoms; intervention not indicated] : Mucositis Oral: Grade 1 - Asymptomatic or mild symptoms; intervention not indicated [Oral Pain: Grade 1 - Mild pain] : Oral Pain: Grade 1 - Mild pain [Dysgeusia: Grade 1- Altered taste but no change in diet] : Dysgeusia: Grade 1 - Altered taste but no change in diet [Skin Hyperpigmentation: Grade 1 - Hyperpigmentation covering <10% BSA; no psychosocial impact] : Skin Hyperpigmentation: Grade 1 - Hyperpigmentation covering <10% BSA; no psychosocial impact [Dermatitis Radiation: Grade 1 - Faint erythema or dry desquamation] : Dermatitis Radiation: Grade 1 - Faint erythema or dry desquamation [Dysphagia: Grade 0] : Dysphagia: Grade 0

## 2024-07-23 NOTE — HISTORY OF PRESENT ILLNESS
[FreeTextEntry1] : Mr. 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 He has a PMH of rectal cancer s/p CRT at ([facility] in [place]).  Treatment delay due to dental extraction. 6/25/2024 He presents for on treatment visit. Completed 2/35 Fx. Reviewed symptom management, oral care and skin care, Patient and wife verbalized understanding. Will start weekly Cisplatin on 6/28/2024.   7/2/2024 He presents for on treatment visit. Completed 7/35 Fx.  He notes dysgeusia.  7/10/2024 He presents for on treatment visit. Completed 12/35 Fx.  7/16/2024 He presents for on treatment visit. Completed 16/35 Fx. He continues to report dysgeusia, thick throat mucus. Swallowing is uncomfortable, denies throat pain. Encouraged to eat soft, moist and liquid foods. Encouraged to take boost supplemental shakes 4x/day Will start Guaifenesin Liquid Strongly declined PEG Follows SLP Follows nutritional services. Encouraged on skin care. Did not start yet.   7/23/2024 He presents for on treatment visit. Completed 21/35 Fx. He improved PO intake with protein shakes made with carnation essentials and boost soothe, fruit and vegetable smoothies. Has c/o excess salivation and mucus. Weight is stable in 1 week. Continues on weekly chemo on Fridays with hydration on Saturdays.

## 2024-07-24 ENCOUNTER — APPOINTMENT (OUTPATIENT)
Dept: OTOLARYNGOLOGY | Facility: CLINIC | Age: 79
End: 2024-07-24
Payer: MEDICARE

## 2024-07-24 VITALS
HEART RATE: 70 BPM | SYSTOLIC BLOOD PRESSURE: 114 MMHG | WEIGHT: 154 LBS | OXYGEN SATURATION: 99 % | DIASTOLIC BLOOD PRESSURE: 73 MMHG | BODY MASS INDEX: 21.56 KG/M2 | HEIGHT: 71 IN

## 2024-07-24 PROCEDURE — 77387B: CUSTOM | Mod: 26

## 2024-07-24 PROCEDURE — 99214 OFFICE O/P EST MOD 30 MIN: CPT

## 2024-07-24 RX ORDER — SODIUM FLUORIDE 1.1 G/100G
1.1 GEL, DENTIFRICE ORAL
Qty: 153 | Refills: 11 | Status: ACTIVE | COMMUNITY
Start: 2024-07-24 | End: 1900-01-01

## 2024-07-24 NOTE — PHYSICAL EXAM
[de-identified] : RT change.  no mass neck [FreeTextEntry1] : RT change at primary site [Normal] : no rashes

## 2024-07-24 NOTE — HISTORY OF PRESENT ILLNESS
[de-identified] :     79yo male presents for a follow up on right paletine tonsil extending to bot scca, p16+. s/p right tonsil biopsy showing Squamous cell carcinoma, predominantly non-keratinizing. Currently mid treametn with Dr. Monson and Dr. Singer. Treatment was delayed due to dental extraction.  wt stable.  Can chew.  thick phlegm.  takes soup/smoothis, takes ensure.  16 lb wt loss to date.   PET 5/12/2024

## 2024-07-24 NOTE — CONSULT LETTER
[Dear  ___] : Dear  [unfilled], [Courtesy Letter:] : I had the pleasure of seeing your patient, [unfilled], in my office today. [Please see my note below.] : Please see my note below. [Sincerely,] : Sincerely, [FreeTextEntry2] : Adam Lawson MD ([place], [place]) [FreeTextEntry3] : Dhiraj Camarillo MD, FACS     [facility] Associate Chair    Department of Otolaryngology  Professor Otolaryngology & Molecular Medicine [facility]

## 2024-07-25 ENCOUNTER — NON-APPOINTMENT (OUTPATIENT)
Age: 79
End: 2024-07-25

## 2024-07-25 PROCEDURE — 77387B: CUSTOM | Mod: 26

## 2024-07-26 ENCOUNTER — RESULT REVIEW (OUTPATIENT)
Age: 79
End: 2024-07-26

## 2024-07-26 ENCOUNTER — APPOINTMENT (OUTPATIENT)
Dept: HEMATOLOGY ONCOLOGY | Facility: CLINIC | Age: 79
End: 2024-07-26

## 2024-07-26 ENCOUNTER — APPOINTMENT (OUTPATIENT)
Dept: HEMATOLOGY ONCOLOGY | Facility: CLINIC | Age: 79
End: 2024-07-26
Payer: MEDICARE

## 2024-07-26 ENCOUNTER — APPOINTMENT (OUTPATIENT)
Dept: INFUSION THERAPY | Facility: HOSPITAL | Age: 79
End: 2024-07-26

## 2024-07-26 DIAGNOSIS — B37.0 CANDIDAL STOMATITIS: ICD-10-CM

## 2024-07-26 LAB
ALBUMIN SERPL ELPH-MCNC: 3.9 G/DL — SIGNIFICANT CHANGE UP (ref 3.3–5)
ALP SERPL-CCNC: 71 U/L — SIGNIFICANT CHANGE UP (ref 40–120)
ALT FLD-CCNC: 28 U/L — SIGNIFICANT CHANGE UP (ref 10–45)
ANION GAP SERPL CALC-SCNC: 12 MMOL/L — SIGNIFICANT CHANGE UP (ref 5–17)
AST SERPL-CCNC: 51 U/L — HIGH (ref 10–40)
BASOPHILS # BLD AUTO: 0.02 K/UL — SIGNIFICANT CHANGE UP (ref 0–0.2)
BASOPHILS NFR BLD AUTO: 0.3 % — SIGNIFICANT CHANGE UP (ref 0–2)
BILIRUB SERPL-MCNC: 0.5 MG/DL — SIGNIFICANT CHANGE UP (ref 0.2–1.2)
BUN SERPL-MCNC: 21 MG/DL — SIGNIFICANT CHANGE UP (ref 7–23)
CALCIUM SERPL-MCNC: 9.7 MG/DL — SIGNIFICANT CHANGE UP (ref 8.4–10.5)
CHLORIDE SERPL-SCNC: 105 MMOL/L — SIGNIFICANT CHANGE UP (ref 96–108)
CO2 SERPL-SCNC: 22 MMOL/L — SIGNIFICANT CHANGE UP (ref 22–31)
CREAT SERPL-MCNC: 1.19 MG/DL — SIGNIFICANT CHANGE UP (ref 0.5–1.3)
EGFR: 63 ML/MIN/1.73M2 — SIGNIFICANT CHANGE UP
EOSINOPHIL # BLD AUTO: 0.09 K/UL — SIGNIFICANT CHANGE UP (ref 0–0.5)
EOSINOPHIL NFR BLD AUTO: 1.6 % — SIGNIFICANT CHANGE UP (ref 0–6)
GLUCOSE SERPL-MCNC: 104 MG/DL — HIGH (ref 70–99)
HCT VFR BLD CALC: 39.4 % — SIGNIFICANT CHANGE UP (ref 39–50)
HGB BLD-MCNC: 13.6 G/DL — SIGNIFICANT CHANGE UP (ref 13–17)
IMM GRANULOCYTES NFR BLD AUTO: 0.3 % — SIGNIFICANT CHANGE UP (ref 0–0.9)
LYMPHOCYTES # BLD AUTO: 0.34 K/UL — LOW (ref 1–3.3)
LYMPHOCYTES # BLD AUTO: 5.9 % — LOW (ref 13–44)
MAGNESIUM SERPL-MCNC: 2.1 MG/DL — SIGNIFICANT CHANGE UP (ref 1.6–2.6)
MCHC RBC-ENTMCNC: 31 PG — SIGNIFICANT CHANGE UP (ref 27–34)
MCHC RBC-ENTMCNC: 34.5 G/DL — SIGNIFICANT CHANGE UP (ref 32–36)
MCV RBC AUTO: 89.7 FL — SIGNIFICANT CHANGE UP (ref 80–100)
MONOCYTES # BLD AUTO: 0.38 K/UL — SIGNIFICANT CHANGE UP (ref 0–0.9)
MONOCYTES NFR BLD AUTO: 6.6 % — SIGNIFICANT CHANGE UP (ref 2–14)
NEUTROPHILS # BLD AUTO: 4.87 K/UL — SIGNIFICANT CHANGE UP (ref 1.8–7.4)
NEUTROPHILS NFR BLD AUTO: 85.3 % — HIGH (ref 43–77)
NRBC # BLD: 0 /100 WBCS — SIGNIFICANT CHANGE UP (ref 0–0)
PLATELET # BLD AUTO: 179 K/UL — SIGNIFICANT CHANGE UP (ref 150–400)
POTASSIUM SERPL-MCNC: 4 MMOL/L — SIGNIFICANT CHANGE UP (ref 3.5–5.3)
POTASSIUM SERPL-SCNC: 4 MMOL/L — SIGNIFICANT CHANGE UP (ref 3.5–5.3)
PROT SERPL-MCNC: 6.5 G/DL — SIGNIFICANT CHANGE UP (ref 6–8.3)
RBC # BLD: 4.39 M/UL — SIGNIFICANT CHANGE UP (ref 4.2–5.8)
RBC # FLD: 13.2 % — SIGNIFICANT CHANGE UP (ref 10.3–14.5)
SODIUM SERPL-SCNC: 139 MMOL/L — SIGNIFICANT CHANGE UP (ref 135–145)
WBC # BLD: 5.72 K/UL — SIGNIFICANT CHANGE UP (ref 3.8–10.5)
WBC # FLD AUTO: 5.72 K/UL — SIGNIFICANT CHANGE UP (ref 3.8–10.5)

## 2024-07-26 PROCEDURE — 77014: CPT | Mod: 26

## 2024-07-26 PROCEDURE — 99214 OFFICE O/P EST MOD 30 MIN: CPT

## 2024-07-26 RX ORDER — NYSTATIN 100000 [USP'U]/ML
100000 SUSPENSION ORAL 4 TIMES DAILY
Qty: 280 | Refills: 0 | Status: ACTIVE | COMMUNITY
Start: 2024-07-26 | End: 1900-01-01

## 2024-07-26 NOTE — ASSESSMENT
[FreeTextEntry1] : 79 yo m with 40PY remote tobacco use, quit 12 years ago, colon cancer, HTN (on meds), with stage II P16 pos [facility]PSCC    We discussed treatment options for locally advanced oropharynx cancer, including surgery followed by radiotherapy +/- chemotherapy or definitive chemoradiation. Given the high likelihood of adjuvant treatment following surgery, we recommend definitive concurrent chemo-radiation therapy with cisplatin-based treatment. He began RT June 24th and started Cisplatin 6/28  Course c/b cisplatin-induced ANTHONY which is improving with IV hydration and hypomagnesemia Treatment changed to carboplatin from cycle 3 after holding cycle 2 for ANTHONY  Plan: -Renal function is improving with hydration. Creatinine 1.28, down from peak close to 2. Encouraged PO hydration and additional day of IV hydration, which is scheduled for tomorrow. Reiterated importance of keeping that appt.  -Continue carboplatin with RT  -Dysphagia and weight loss. Discussed nutrition. Pt does not want a G tube and understands that he needs to increase PO intake of calories -Continue RT. Plan for 35frx -PET scan reviewed and given FDG uptake within esophagus/colon: Is following with GI and planning for colonoscopy  -Monitor scattered thyroid nodules including dominant right thyroid nodule. to be addressed after CRT completed -Hypokalemia improved. IV K added to hydration each time. No need for PO potassium -Oral candidiasis: Start Nystatin  -OV weekly with treatment

## 2024-07-26 NOTE — REVIEW OF SYSTEMS
[Fatigue] : fatigue [Recent Change In Weight] : ~T recent weight change [Dysphagia] : dysphagia [Odynophagia] : odynophagia [Diarrhea: Grade 0] : Diarrhea: Grade 0 [Negative] : Allergic/Immunologic

## 2024-07-26 NOTE — HISTORY OF PRESENT ILLNESS
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  Onc History: Past history of Rectal cancer s/p neoadjuvant/adjuvant therapies and Abdominoperineal resection 2010. was having ear pain for quite some time and was treated with abx for a while before referred to ENT.  He had MRI done for 6 months history of right ear Fullness and pressure: 4/11/2024 MRI soft tissue neck ([facility]) showing mixed solid/cystic right oral cavity/oropharyngeal mass, measuring up to 49mm. -Enlarged 19mm right and 15mm left level 2A lymph nodes. -Asymmetric right parotid gland which may represent parotitis. - multinodular thyroid with 23 mm right thyroid nodule. -large right and moderate left mastoid effusions.  5/1/2024 Established care with Dr Camarillo.  5/9/2024 Biopsy Tonsil, superior pole of right tonsil, excision -Invasive squamous cell carcinoma, predominantly non-keratinizing Positive for p16 MRI LHR 4/16/24:  US thyroid 5/7/24: Scattered thyroid nodules including dominant right thyroid nodule. Consider further evaluation with ultrasound-guided fine-needle aspiration as clinically indicated. Subcentimeter hypoechoic right parotid nodule corresponding to the area of palpable abnormality.  TI-RAD 3: Mildly suspicious (FNA if > 2.5 cm, Follow if > 1.5 cm) 5/12/2024 PET/CT IMPRESSION: 1. Intensely FDG-avid mass involving the RIGHT tongue base and tonsil, consistent with malignant involvement. 2. Bilaterally hypermetabolic lymph nodes (more so on the LEFT neck compared with the RIGHT), suspicious for metastatic disease. 3. Foci in the RIGHT colon may be due to diverticular disease; consider further evaluation with CT or MR enterography, as malignant polyps may also present in this fashion. History of previous colon cancer noted. 4. Focus in the subcarinal esophagus an area of thickening; suggest UPPER GI fluoroscopy and/or EGD if this will change patient management.  5/202024 He presents here for consideration of radiation therapy. Ongoing Right ear fullness and pressure. Denies pain, dysphagia, dysphonia and or dyspnea. Dental clearance already obtained from [facility].  5/20/2024 Follow up for GI Dr Camacho for avidity on PET. Was felt to be polyps and this will be removed soon.   6/28/24: Pt seen today for follow up while receiving his first treatment of Cisplatin. He began concurrent RT earlier this week. He reports that since starting RT he has a metallic taste in his mouth but otherwise feels ok. Denies F/C/N/V/D, SOB, dysphagia, odynophagia.   7/5/24: Patient seen today for follow up accompanied by his wife. He is here for C2 cis. He continues RT. He reports ongoing dysgeusia that is interfering with his ability to eat. He denies pain, N/V/D, F/C, dysuria STAT BMP done in trx room prior to cisplatin shows Cr 1.83 from 1.03  7/19/24: Has thick phlegm, dysgeusia, anorexia, and weight loss (>15 lbs), and dysphagia. He has been trying to take increasing amounts of puree, ensure supplements and protein shakes.  7/26/24: Patient seen today for follow up while continuing weekly carboplatin with RT. He is accompanied by his wife. They report that since his prior treatment his mouth has been very dry with thick saliva but not experiencing much pain. He has been maintaining oral nutrition with smoothies and soups as well as ensure. He denies N/V/D, fever, constipation.  [de-identified] : +p16 SCC of the Right Tonsil

## 2024-07-26 NOTE — PHYSICAL EXAM
[Restricted in physically strenuous activity but ambulatory and able to carry out work of a light or sedentary nature] : Status 1- Restricted in physically strenuous activity but ambulatory and able to carry out work of a light or sedentary nature, e.g., light house work, office work [Normal] : normal appearance, no rash, nodules, vesicles, ulcers, erythema [de-identified] : dry mouth [de-identified] : LAD improved

## 2024-07-26 NOTE — CONSULT LETTER
[Dear  ___] : Dear  [unfilled], [Consult Letter:] : I had the pleasure of evaluating your patient, [unfilled]. [Please see my note below.] : Please see my note below. [Consult Closing:] : Thank you very much for allowing me to participate in the care of this patient.  If you have any questions, please do not hesitate to contact me. [Sincerely,] : Sincerely, [DrLaurel  ___] : Dr. FIGUEROA [FreeTextEntry2] : Dr. Dhiraj Camarillo [FreeTextEntry3] : Luis Alberto Singer MD

## 2024-07-27 ENCOUNTER — APPOINTMENT (OUTPATIENT)
Dept: INFUSION THERAPY | Facility: HOSPITAL | Age: 79
End: 2024-07-27

## 2024-07-27 LAB
T4 FREE SERPL-MCNC: 1.5 NG/DL — SIGNIFICANT CHANGE UP (ref 0.9–1.8)
T4 FREE+ TSH PNL SERPL: 0.06 UIU/ML — LOW (ref 0.27–4.2)

## 2024-07-29 ENCOUNTER — NON-APPOINTMENT (OUTPATIENT)
Age: 79
End: 2024-07-29

## 2024-07-29 ENCOUNTER — APPOINTMENT (OUTPATIENT)
Dept: OTOLARYNGOLOGY | Facility: CLINIC | Age: 79
End: 2024-07-29

## 2024-07-29 VITALS
RESPIRATION RATE: 17 BRPM | DIASTOLIC BLOOD PRESSURE: 74 MMHG | HEIGHT: 71 IN | TEMPERATURE: 98.3 F | OXYGEN SATURATION: 99 % | BODY MASS INDEX: 21.56 KG/M2 | SYSTOLIC BLOOD PRESSURE: 111 MMHG | HEART RATE: 79 BPM | WEIGHT: 154 LBS

## 2024-07-29 PROCEDURE — 77387B: CUSTOM | Mod: 26

## 2024-07-29 NOTE — HISTORY OF PRESENT ILLNESS
[FreeTextEntry1] : Mr. 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 He has a PMH of rectal cancer s/p CRT at ([facility] in [place]).  Treatment delay due to dental extraction. 6/25/2024 He presents for on treatment visit. Completed 2/35 Fx. Reviewed symptom management, oral care and skin care, Patient and wife verbalized understanding. Will start weekly Cisplatin on 6/28/2024.   7/2/2024 He presents for on treatment visit. Completed 7/35 Fx.  He notes dysgeusia.  7/10/2024 He presents for on treatment visit. Completed 12/35 Fx.  7/16/2024 He presents for on treatment visit. Completed 16/35 Fx. He continues to report dysgeusia, thick throat mucus. Swallowing is uncomfortable, denies throat pain. Encouraged to eat soft, moist and liquid foods. Encouraged to take boost supplemental shakes 4x/day Will start Guaifenesin Liquid Strongly declined PEG Follows SLP Follows nutritional services. Encouraged on skin care. Did not start yet.   7/23/2024 He presents for on treatment visit. Completed 21/35 Fx. He improved PO intake with protein shakes made with carnation essentials and boost soothe, fruit and vegetable smoothies. Has c/o excess salivation and mucus. Weight is stable in 1 week. Continues on weekly chemo on Fridays with hydration on Saturdays.  7/29/2024 He presents for on treatment visit. Completed 25/35 Fx.

## 2024-07-29 NOTE — DISEASE MANAGEMENT
[Clinical] : TNM Stage: c [II] : II [TTNM] : 2 [NTNM] : 2 [MTNM] : x [de-identified] : [id number] [de-identified] : 7000cGy [de-identified] : Oropharynx/Neck

## 2024-07-29 NOTE — DISEASE MANAGEMENT
[Clinical] : TNM Stage: c [II] : II [TTNM] : 2 [NTNM] : 2 [MTNM] : x [de-identified] : [id number] [de-identified] : 7000cGy [de-identified] : Oropharynx/Neck

## 2024-07-29 NOTE — REVIEW OF SYSTEMS
[Dysphagia: Grade 0] : Dysphagia: Grade 0 [Mucositis Oral: Grade 1 - Asymptomatic or mild symptoms; intervention not indicated] : Mucositis Oral: Grade 1 - Asymptomatic or mild symptoms; intervention not indicated [Oral Pain: Grade 1 - Mild pain] : Oral Pain: Grade 1 - Mild pain [Dysgeusia: Grade 1- Altered taste but no change in diet] : Dysgeusia: Grade 1 - Altered taste but no change in diet [Dermatitis Radiation: Grade 1 - Faint erythema or dry desquamation] : Dermatitis Radiation: Grade 1 - Faint erythema or dry desquamation [Skin Hyperpigmentation: Grade 1 - Hyperpigmentation covering <10% BSA; no psychosocial impact] : Skin Hyperpigmentation: Grade 1 - Hyperpigmentation covering <10% BSA; no psychosocial impact

## 2024-07-29 NOTE — HISTORY OF PRESENT ILLNESS
[FreeTextEntry1] : Mr. 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 He has a PMH of rectal cancer s/p CRT at ([facility] in [place]).  Treatment delay due to dental extraction. 6/25/2024 He presents for on treatment visit. Completed 2/35 Fx. Reviewed symptom management, oral care and skin care, Patient and wife verbalized understanding. Will start weekly Cisplatin on 6/28/2024.   7/2/2024 He presents for on treatment visit. Completed 7/35 Fx.  He notes dysgeusia.  7/10/2024 He presents for on treatment visit. Completed 12/35 Fx.  7/16/2024 He presents for on treatment visit. Completed 16/35 Fx. He continues to report dysgeusia, thick throat mucus. Swallowing is uncomfortable, denies throat pain. Encouraged to eat soft, moist and liquid foods. Encouraged to take boost supplemental shakes 4x/day Will start Guaifenesin Liquid Strongly declined PEG Follows SLP Follows nutritional services. Encouraged on skin care. Did not start yet.   7/23/2024 He presents for on treatment visit. Completed 21/35 Fx. He improved PO intake with protein shakes made with carnation essentials and boost soothe, fruit and vegetable smoothies. Has c/o excess salivation and mucus. Weight is stable in 1 week. Continues on weekly chemo on Fridays with hydration on Saturdays.  7/29/2024 He presents for on treatment visit. Completed 25/35 Fx.

## 2024-07-29 NOTE — PHYSICAL EXAM
[Normal] : no palpable adenopathy [] : no rash [de-identified] : +thrush [de-identified] : Moderate hyperpigmentation and erythema of bilateral necks

## 2024-07-29 NOTE — PHYSICAL EXAM
[Normal] : no palpable adenopathy [] : no rash [de-identified] : +thrush [de-identified] : Moderate hyperpigmentation and erythema of bilateral necks

## 2024-07-30 PROCEDURE — 77387B: CUSTOM | Mod: 26

## 2024-07-31 ENCOUNTER — NON-APPOINTMENT (OUTPATIENT)
Age: 79
End: 2024-07-31

## 2024-07-31 PROCEDURE — 77387B: CUSTOM | Mod: 26

## 2024-08-01 PROCEDURE — 77427 RADIATION TX MANAGEMENT X5: CPT

## 2024-08-01 PROCEDURE — 77387B: CUSTOM | Mod: 26

## 2024-08-02 ENCOUNTER — APPOINTMENT (OUTPATIENT)
Dept: HEMATOLOGY ONCOLOGY | Facility: CLINIC | Age: 79
End: 2024-08-02

## 2024-08-02 ENCOUNTER — APPOINTMENT (OUTPATIENT)
Dept: INFUSION THERAPY | Facility: HOSPITAL | Age: 79
End: 2024-08-02

## 2024-08-02 ENCOUNTER — RESULT REVIEW (OUTPATIENT)
Age: 79
End: 2024-08-02

## 2024-08-02 ENCOUNTER — APPOINTMENT (OUTPATIENT)
Dept: HEMATOLOGY ONCOLOGY | Facility: CLINIC | Age: 79
End: 2024-08-02
Payer: MEDICARE

## 2024-08-02 DIAGNOSIS — C10.9 MALIGNANT NEOPLASM OF OROPHARYNX, UNSPECIFIED: ICD-10-CM

## 2024-08-02 DIAGNOSIS — N17.9 ACUTE KIDNEY FAILURE, UNSPECIFIED: ICD-10-CM

## 2024-08-02 LAB
ALBUMIN SERPL ELPH-MCNC: 3.8 G/DL — SIGNIFICANT CHANGE UP (ref 3.3–5)
ALP SERPL-CCNC: 63 U/L — SIGNIFICANT CHANGE UP (ref 40–120)
ALT FLD-CCNC: 15 U/L — SIGNIFICANT CHANGE UP (ref 10–45)
ANION GAP SERPL CALC-SCNC: 11 MMOL/L — SIGNIFICANT CHANGE UP (ref 5–17)
AST SERPL-CCNC: 43 U/L — HIGH (ref 10–40)
BASOPHILS # BLD AUTO: 0.03 K/UL — SIGNIFICANT CHANGE UP (ref 0–0.2)
BASOPHILS NFR BLD AUTO: 0.6 % — SIGNIFICANT CHANGE UP (ref 0–2)
BILIRUB SERPL-MCNC: 0.5 MG/DL — SIGNIFICANT CHANGE UP (ref 0.2–1.2)
BUN SERPL-MCNC: 21 MG/DL — SIGNIFICANT CHANGE UP (ref 7–23)
CALCIUM SERPL-MCNC: 9.7 MG/DL — SIGNIFICANT CHANGE UP (ref 8.4–10.5)
CHLORIDE SERPL-SCNC: 105 MMOL/L — SIGNIFICANT CHANGE UP (ref 96–108)
CO2 SERPL-SCNC: 22 MMOL/L — SIGNIFICANT CHANGE UP (ref 22–31)
CREAT SERPL-MCNC: 1.2 MG/DL — SIGNIFICANT CHANGE UP (ref 0.5–1.3)
EGFR: 62 ML/MIN/1.73M2 — SIGNIFICANT CHANGE UP
EOSINOPHIL # BLD AUTO: 0.07 K/UL — SIGNIFICANT CHANGE UP (ref 0–0.5)
EOSINOPHIL NFR BLD AUTO: 1.4 % — SIGNIFICANT CHANGE UP (ref 0–6)
GLUCOSE SERPL-MCNC: 192 MG/DL — HIGH (ref 70–99)
HCT VFR BLD CALC: 38.4 % — LOW (ref 39–50)
HGB BLD-MCNC: 13.4 G/DL — SIGNIFICANT CHANGE UP (ref 13–17)
IMM GRANULOCYTES NFR BLD AUTO: 1.4 % — HIGH (ref 0–0.9)
LYMPHOCYTES # BLD AUTO: 0.34 K/UL — LOW (ref 1–3.3)
LYMPHOCYTES # BLD AUTO: 6.7 % — LOW (ref 13–44)
MAGNESIUM SERPL-MCNC: 1.9 MG/DL — SIGNIFICANT CHANGE UP (ref 1.6–2.6)
MCHC RBC-ENTMCNC: 30.6 PG — SIGNIFICANT CHANGE UP (ref 27–34)
MCHC RBC-ENTMCNC: 34.9 G/DL — SIGNIFICANT CHANGE UP (ref 32–36)
MCV RBC AUTO: 87.7 FL — SIGNIFICANT CHANGE UP (ref 80–100)
MONOCYTES # BLD AUTO: 0.3 K/UL — SIGNIFICANT CHANGE UP (ref 0–0.9)
MONOCYTES NFR BLD AUTO: 5.9 % — SIGNIFICANT CHANGE UP (ref 2–14)
NEUTROPHILS # BLD AUTO: 4.25 K/UL — SIGNIFICANT CHANGE UP (ref 1.8–7.4)
NEUTROPHILS NFR BLD AUTO: 84 % — HIGH (ref 43–77)
NRBC # BLD: 0 /100 WBCS — SIGNIFICANT CHANGE UP (ref 0–0)
PLATELET # BLD AUTO: 192 K/UL — SIGNIFICANT CHANGE UP (ref 150–400)
POTASSIUM SERPL-MCNC: 3.9 MMOL/L — SIGNIFICANT CHANGE UP (ref 3.5–5.3)
POTASSIUM SERPL-SCNC: 3.9 MMOL/L — SIGNIFICANT CHANGE UP (ref 3.5–5.3)
PROT SERPL-MCNC: 6.3 G/DL — SIGNIFICANT CHANGE UP (ref 6–8.3)
RBC # BLD: 4.38 M/UL — SIGNIFICANT CHANGE UP (ref 4.2–5.8)
RBC # FLD: 13.6 % — SIGNIFICANT CHANGE UP (ref 10.3–14.5)
SODIUM SERPL-SCNC: 138 MMOL/L — SIGNIFICANT CHANGE UP (ref 135–145)
T4 FREE+ TSH PNL SERPL: 0.08 UIU/ML — LOW (ref 0.27–4.2)
WBC # BLD: 5.06 K/UL — SIGNIFICANT CHANGE UP (ref 3.8–10.5)
WBC # FLD AUTO: 5.06 K/UL — SIGNIFICANT CHANGE UP (ref 3.8–10.5)

## 2024-08-02 PROCEDURE — 99214 OFFICE O/P EST MOD 30 MIN: CPT

## 2024-08-02 PROCEDURE — 77014: CPT | Mod: 26

## 2024-08-02 NOTE — PHYSICAL EXAM
[Restricted in physically strenuous activity but ambulatory and able to carry out work of a light or sedentary nature] : Status 1- Restricted in physically strenuous activity but ambulatory and able to carry out work of a light or sedentary nature, e.g., light house work, office work [Normal] : normal appearance, no rash, nodules, vesicles, ulcers, erythema [de-identified] : dry mouth [de-identified] : LAD improved

## 2024-08-02 NOTE — HISTORY OF PRESENT ILLNESS
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  Onc History: Past history of Rectal cancer s/p neoadjuvant/adjuvant therapies and Abdominoperineal resection 2010. was having ear pain for quite some time and was treated with abx for a while before referred to ENT.  He had MRI done for 6 months history of right ear Fullness and pressure: 4/11/2024 MRI soft tissue neck ([facility]) showing mixed solid/cystic right oral cavity/oropharyngeal mass, measuring up to 49mm. -Enlarged 19mm right and 15mm left level 2A lymph nodes. -Asymmetric right parotid gland which may represent parotitis. - multinodular thyroid with 23 mm right thyroid nodule. -large right and moderate left mastoid effusions.  5/1/2024 Established care with Dr Camarillo.  5/9/2024 Biopsy Tonsil, superior pole of right tonsil, excision -Invasive squamous cell carcinoma, predominantly non-keratinizing Positive for p16 MRI LHR 4/16/24:  US thyroid 5/7/24: Scattered thyroid nodules including dominant right thyroid nodule. Consider further evaluation with ultrasound-guided fine-needle aspiration as clinically indicated. Subcentimeter hypoechoic right parotid nodule corresponding to the area of palpable abnormality.  TI-RAD 3: Mildly suspicious (FNA if > 2.5 cm, Follow if > 1.5 cm) 5/12/2024 PET/CT IMPRESSION: 1. Intensely FDG-avid mass involving the RIGHT tongue base and tonsil, consistent with malignant involvement. 2. Bilaterally hypermetabolic lymph nodes (more so on the LEFT neck compared with the RIGHT), suspicious for metastatic disease. 3. Foci in the RIGHT colon may be due to diverticular disease; consider further evaluation with CT or MR enterography, as malignant polyps may also present in this fashion. History of previous colon cancer noted. 4. Focus in the subcarinal esophagus an area of thickening; suggest UPPER GI fluoroscopy and/or EGD if this will change patient management.  5/202024 He presents here for consideration of radiation therapy. Ongoing Right ear fullness and pressure. Denies pain, dysphagia, dysphonia and or dyspnea. Dental clearance already obtained from [facility].  5/20/2024 Follow up for GI Dr Camacho for avidity on PET. Was felt to be polyps and this will be removed soon.   6/28/24: Pt seen today for follow up while receiving his first treatment of Cisplatin. He began concurrent RT earlier this week. He reports that since starting RT he has a metallic taste in his mouth but otherwise feels ok. Denies F/C/N/V/D, SOB, dysphagia, odynophagia.   7/5/24: Patient seen today for follow up accompanied by his wife. He is here for C2 cis. He continues RT. He reports ongoing dysgeusia that is interfering with his ability to eat. He denies pain, N/V/D, F/C, dysuria STAT BMP done in trx room prior to cisplatin shows Cr 1.83 from 1.03  7/19/24: Has thick phlegm, dysgeusia, anorexia, and weight loss (>15 lbs), and dysphagia. He has been trying to take increasing amounts of puree, ensure supplements and protein shakes.  7/26/24: Patient seen today for follow up while continuing weekly carboplatin with RT. He is accompanied by his wife. They report that since his prior treatment his mouth has been very dry with thick saliva but not experiencing much pain. He has been maintaining oral nutrition with smoothies and soups as well as ensure. He denies N/V/D, fever, constipation.   8/2/24: Patient seen today in treatment room for follow up while receiving weekly carboplatin with RT. He reports ongoing thick saliva. He has not been taking nystatin as directed, was only taking is twice daily. Continues to maintain oral intake with smoothies and soups. Denies N/V/D/C, F/C, SOB [de-identified] : +p16 SCC of the Right Tonsil

## 2024-08-02 NOTE — ASSESSMENT
[FreeTextEntry1] : 77 yo m with 40PY remote tobacco use, quit 12 years ago, colon cancer, HTN (on meds), with stage II P16 pos OPSCC    We discussed treatment options for locally advanced oropharynx cancer, including surgery followed by radiotherapy +/- chemotherapy or definitive chemoradiation. Given the high likelihood of adjuvant treatment following surgery, we recommend definitive concurrent chemo-radiation therapy with cisplatin-based treatment. He began RT June 24th and started Cisplatin 6/28  Course c/b cisplatin-induced ANTHONY which is improving with IV hydration and hypomagnesemia Treatment changed to carboplatin from cycle 3 after holding cycle 2 for ANTHONY  Plan: -Renal function is improving with hydration. Creatinine 1.2, down from peak close to 2. Encouraged PO hydration and additional day of IV hydration, which is scheduled for tomorrow. Reiterated importance of keeping that appt.  -Continue carboplatin with RT. Next week will be his final week  -Dysphagia and weight loss. Discussed nutrition. Pt does not want a G tube and understands that he needs to increase PO intake of calories -Continue RT. Plan for 35frx -PET scan reviewed and given FDG uptake within esophagus/colon: Is following with GI and planning for colonoscopy  -Monitor scattered thyroid nodules including dominant right thyroid nodule. to be addressed after CRT completed -Hypokalemia improved. IV K added to hydration each time. No need for PO potassium -Oral candidiasis: Patient was not taking Nystatin as directed. Given persistent thrush and discomfort, will Rx Fluconazole x7 days  -OV weekly with treatment

## 2024-08-03 ENCOUNTER — APPOINTMENT (OUTPATIENT)
Dept: INFUSION THERAPY | Facility: HOSPITAL | Age: 79
End: 2024-08-03

## 2024-08-03 LAB — T4 FREE SERPL-MCNC: 1.5 NG/DL — SIGNIFICANT CHANGE UP (ref 0.9–1.8)

## 2024-08-05 ENCOUNTER — APPOINTMENT (OUTPATIENT)
Dept: OTOLARYNGOLOGY | Facility: CLINIC | Age: 79
End: 2024-08-05

## 2024-08-05 DIAGNOSIS — C76.0 MALIGNANT NEOPLASM OF HEAD, FACE AND NECK: ICD-10-CM

## 2024-08-05 PROCEDURE — 77387B: CUSTOM | Mod: 26

## 2024-08-06 ENCOUNTER — NON-APPOINTMENT (OUTPATIENT)
Age: 79
End: 2024-08-06

## 2024-08-06 PROBLEM — L58.9 RADIATION DERMATITIS: Status: ACTIVE | Noted: 2024-08-06

## 2024-08-06 PROCEDURE — 77387B: CUSTOM | Mod: 26

## 2024-08-06 PROCEDURE — 77427 RADIATION TX MANAGEMENT X5: CPT

## 2024-08-06 NOTE — REVIEW OF SYSTEMS
[Dysphagia: Grade 0] : Dysphagia: Grade 0 [Mucositis Oral: Grade 1 - Asymptomatic or mild symptoms; intervention not indicated] : Mucositis Oral: Grade 1 - Asymptomatic or mild symptoms; intervention not indicated [Oral Pain: Grade 1 - Mild pain] : Oral Pain: Grade 1 - Mild pain [Dysgeusia: Grade 1- Altered taste but no change in diet] : Dysgeusia: Grade 1 - Altered taste but no change in diet [Skin Hyperpigmentation: Grade 1 - Hyperpigmentation covering <10% BSA; no psychosocial impact] : Skin Hyperpigmentation: Grade 1 - Hyperpigmentation covering <10% BSA; no psychosocial impact [Dermatitis Radiation: Grade 1 - Faint erythema or dry desquamation] : Dermatitis Radiation: Grade 1 - Faint erythema or dry desquamation [Dermatitis Radiation: Grade 2 - Moderate to brisk erythema; patchy moist desquamation, mostly confined to skin folds and creases; moderate edema] : Dermatitis Radiation: Grade 2 - Moderate to brisk erythema; patchy moist desquamation, mostly confined to skin folds and creases; moderate edema

## 2024-08-06 NOTE — DISEASE MANAGEMENT
[Clinical] : TNM Stage: c [II] : II [TTNM] : 2 [NTNM] : 2 [MTNM] : x [de-identified] : [id number] [de-identified] : 7000cGy [de-identified] : Oropharynx/Neck

## 2024-08-06 NOTE — DISEASE MANAGEMENT
[Clinical] : TNM Stage: c [II] : II [TTNM] : 2 [NTNM] : 2 [MTNM] : x [de-identified] : [id number] [de-identified] : 7000cGy [de-identified] : Oropharynx/Neck

## 2024-08-06 NOTE — PHYSICAL EXAM
[Normal] : no palpable adenopathy [] : no rash [de-identified] : +thrush (improving) [de-identified] : Moderate hyperpigmentation and erythema of bilateral necks with areas of desquamayion in skin folds of neck

## 2024-08-06 NOTE — HISTORY OF PRESENT ILLNESS
[FreeTextEntry1] : Mr. 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 He has a PMH of rectal cancer s/p CRT at ([facility] in [place]).  Treatment delay due to dental extraction. 6/25/2024 He presents for on treatment visit. Completed 2/35 Fx. Reviewed symptom management, oral care and skin care, Patient and wife verbalized understanding. Will start weekly Cisplatin on 6/28/2024.   7/2/2024 He presents for on treatment visit. Completed 7/35 Fx.  He notes dysgeusia.  7/10/2024 He presents for on treatment visit. Completed 12/35 Fx.  7/16/2024 He presents for on treatment visit. Completed 16/35 Fx. He continues to report dysgeusia, thick throat mucus. Swallowing is uncomfortable, denies throat pain. Encouraged to eat soft, moist and liquid foods. Encouraged to take boost supplemental shakes 4x/day Will start Guaifenesin Liquid Strongly declined PEG Follows SLP Follows nutritional services. Encouraged on skin care. Did not start yet.   7/23/2024 He presents for on treatment visit. Completed 21/35 Fx. He improved PO intake with protein shakes made with carnation essentials and boost soothe, fruit and vegetable smoothies. Has c/o excess salivation and mucus. Weight is stable in 1 week. Continues on weekly chemo on Fridays with hydration on Saturdays.  7/29/2024 He presents for on treatment visit. Completed 25/35 Fx. Patient denies pain, dysphagia, continues with liquid diet.    8/6/2024 He presents for on treatment visit. Completed 31/35 Fx.  Weight stable

## 2024-08-06 NOTE — PHYSICAL EXAM
[Normal] : no palpable adenopathy [] : no rash [de-identified] : Moderate hyperpigmentation and erythema of bilateral necks with areas of desquamayion in skin folds of neck [de-identified] : +thrush (improving)

## 2024-08-06 NOTE — HISTORY OF PRESENT ILLNESS
[FreeTextEntry1] : Mr. 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 He has a PMH of rectal cancer s/p CRT at ([facility] in [place]).  Treatment delay due to dental extraction. 6/25/2024 He presents for on treatment visit. Completed 2/35 Fx. Reviewed symptom management, oral care and skin care, Patient and wife verbalized understanding. Will start weekly Cisplatin on 6/28/2024.   7/2/2024 He presents for on treatment visit. Completed 7/35 Fx.  He notes dysgeusia.  7/10/2024 He presents for on treatment visit. Completed 12/35 Fx.  7/16/2024 He presents for on treatment visit. Completed 16/35 Fx. He continues to report dysgeusia, thick throat mucus. Swallowing is uncomfortable, denies throat pain. Encouraged to eat soft, moist and liquid foods. Encouraged to take boost supplemental shakes 4x/day Will start Guaifenesin Liquid Strongly declined PEG Follows SLP Follows nutritional services. Encouraged on skin care. Did not start yet.   7/23/2024 He presents for on treatment visit. Completed 21/35 Fx. He improved PO intake with protein shakes made with carnation essentials and boost soothe, fruit and vegetable smoothies. Has c/o excess salivation and mucus. Weight is stable in 1 week. Continues on weekly chemo on Fridays with hydration on Saturdays.  7/29/2024 He presents for on treatment visit. Completed 25/35 Fx. Patient denies pain, dysphagia, continues with liquid diet.    8/6/2024 He presents for on treatment visit. Completed 31/35 Fx.  Weight stable

## 2024-08-07 ENCOUNTER — NON-APPOINTMENT (OUTPATIENT)
Age: 79
End: 2024-08-07

## 2024-08-07 PROCEDURE — 77387B: CUSTOM | Mod: 26

## 2024-08-08 PROCEDURE — 77387B: CUSTOM | Mod: 26

## 2024-08-09 ENCOUNTER — RESULT REVIEW (OUTPATIENT)
Age: 79
End: 2024-08-09

## 2024-08-09 ENCOUNTER — APPOINTMENT (OUTPATIENT)
Dept: INFUSION THERAPY | Facility: HOSPITAL | Age: 79
End: 2024-08-09

## 2024-08-09 ENCOUNTER — APPOINTMENT (OUTPATIENT)
Dept: HEMATOLOGY ONCOLOGY | Facility: CLINIC | Age: 79
End: 2024-08-09

## 2024-08-09 LAB
ALBUMIN SERPL ELPH-MCNC: 4.2 G/DL — SIGNIFICANT CHANGE UP (ref 3.3–5)
ALP SERPL-CCNC: 80 U/L — SIGNIFICANT CHANGE UP (ref 40–120)
ALT FLD-CCNC: 21 U/L — SIGNIFICANT CHANGE UP (ref 10–45)
ANION GAP SERPL CALC-SCNC: 15 MMOL/L — SIGNIFICANT CHANGE UP (ref 5–17)
AST SERPL-CCNC: 48 U/L — HIGH (ref 10–40)
BASOPHILS # BLD AUTO: 0.02 K/UL — SIGNIFICANT CHANGE UP (ref 0–0.2)
BASOPHILS NFR BLD AUTO: 0.4 % — SIGNIFICANT CHANGE UP (ref 0–2)
BILIRUB SERPL-MCNC: 0.8 MG/DL — SIGNIFICANT CHANGE UP (ref 0.2–1.2)
BUN SERPL-MCNC: 33 MG/DL — HIGH (ref 7–23)
CALCIUM SERPL-MCNC: 10.5 MG/DL — SIGNIFICANT CHANGE UP (ref 8.4–10.5)
CHLORIDE SERPL-SCNC: 101 MMOL/L — SIGNIFICANT CHANGE UP (ref 96–108)
CO2 SERPL-SCNC: 21 MMOL/L — LOW (ref 22–31)
CREAT SERPL-MCNC: 1.24 MG/DL — SIGNIFICANT CHANGE UP (ref 0.5–1.3)
EGFR: 60 ML/MIN/1.73M2 — SIGNIFICANT CHANGE UP
EOSINOPHIL # BLD AUTO: 0.05 K/UL — SIGNIFICANT CHANGE UP (ref 0–0.5)
EOSINOPHIL NFR BLD AUTO: 0.9 % — SIGNIFICANT CHANGE UP (ref 0–6)
GLUCOSE SERPL-MCNC: 125 MG/DL — HIGH (ref 70–99)
HCT VFR BLD CALC: 38.1 % — LOW (ref 39–50)
HGB BLD-MCNC: 13.3 G/DL — SIGNIFICANT CHANGE UP (ref 13–17)
IMM GRANULOCYTES NFR BLD AUTO: 0.6 % — SIGNIFICANT CHANGE UP (ref 0–0.9)
LYMPHOCYTES # BLD AUTO: 0.37 K/UL — LOW (ref 1–3.3)
LYMPHOCYTES # BLD AUTO: 7 % — LOW (ref 13–44)
MAGNESIUM SERPL-MCNC: 1.9 MG/DL — SIGNIFICANT CHANGE UP (ref 1.6–2.6)
MCHC RBC-ENTMCNC: 31.1 PG — SIGNIFICANT CHANGE UP (ref 27–34)
MCHC RBC-ENTMCNC: 34.9 G/DL — SIGNIFICANT CHANGE UP (ref 32–36)
MCV RBC AUTO: 89 FL — SIGNIFICANT CHANGE UP (ref 80–100)
MONOCYTES # BLD AUTO: 0.51 K/UL — SIGNIFICANT CHANGE UP (ref 0–0.9)
MONOCYTES NFR BLD AUTO: 9.6 % — SIGNIFICANT CHANGE UP (ref 2–14)
NEUTROPHILS # BLD AUTO: 4.34 K/UL — SIGNIFICANT CHANGE UP (ref 1.8–7.4)
NEUTROPHILS NFR BLD AUTO: 81.5 % — HIGH (ref 43–77)
NRBC # BLD: 0 /100 WBCS — SIGNIFICANT CHANGE UP (ref 0–0)
PLATELET # BLD AUTO: 222 K/UL — SIGNIFICANT CHANGE UP (ref 150–400)
POTASSIUM SERPL-MCNC: 4.1 MMOL/L — SIGNIFICANT CHANGE UP (ref 3.5–5.3)
POTASSIUM SERPL-SCNC: 4.1 MMOL/L — SIGNIFICANT CHANGE UP (ref 3.5–5.3)
PROT SERPL-MCNC: 7.3 G/DL — SIGNIFICANT CHANGE UP (ref 6–8.3)
RBC # BLD: 4.28 M/UL — SIGNIFICANT CHANGE UP (ref 4.2–5.8)
RBC # FLD: 14.5 % — SIGNIFICANT CHANGE UP (ref 10.3–14.5)
SODIUM SERPL-SCNC: 136 MMOL/L — SIGNIFICANT CHANGE UP (ref 135–145)
WBC # BLD: 5.32 K/UL — SIGNIFICANT CHANGE UP (ref 3.8–10.5)
WBC # FLD AUTO: 5.32 K/UL — SIGNIFICANT CHANGE UP (ref 3.8–10.5)

## 2024-08-09 PROCEDURE — G2211 COMPLEX E/M VISIT ADD ON: CPT

## 2024-08-09 PROCEDURE — 99214 OFFICE O/P EST MOD 30 MIN: CPT

## 2024-08-09 PROCEDURE — 77014: CPT | Mod: 26

## 2024-08-09 RX ORDER — FLUCONAZOLE 100 MG/1
100 TABLET ORAL
Qty: 15 | Refills: 0 | Status: ACTIVE | COMMUNITY
Start: 2024-08-02 | End: 1900-01-01

## 2024-08-10 ENCOUNTER — APPOINTMENT (OUTPATIENT)
Dept: INFUSION THERAPY | Facility: HOSPITAL | Age: 79
End: 2024-08-10

## 2024-08-10 LAB
T4 FREE SERPL-MCNC: 1.6 NG/DL — SIGNIFICANT CHANGE UP (ref 0.9–1.8)
T4 FREE+ TSH PNL SERPL: 0.13 UIU/ML — LOW (ref 0.27–4.2)

## 2024-08-11 ENCOUNTER — INPATIENT (INPATIENT)
Facility: HOSPITAL | Age: 79
LOS: 7 days | Discharge: HOME HEALTH | DRG: 395 | End: 2024-08-19
Attending: SURGERY | Admitting: SURGERY
Payer: MEDICARE

## 2024-08-11 VITALS
SYSTOLIC BLOOD PRESSURE: 110 MMHG | HEART RATE: 64 BPM | DIASTOLIC BLOOD PRESSURE: 53 MMHG | OXYGEN SATURATION: 100 % | RESPIRATION RATE: 20 BRPM | TEMPERATURE: 97 F | HEIGHT: 71 IN

## 2024-08-11 DIAGNOSIS — Z93.3 COLOSTOMY STATUS: Chronic | ICD-10-CM

## 2024-08-11 DIAGNOSIS — K66.8 OTHER SPECIFIED DISORDERS OF PERITONEUM: ICD-10-CM

## 2024-08-11 DIAGNOSIS — Z92.3 PERSONAL HISTORY OF IRRADIATION: Chronic | ICD-10-CM

## 2024-08-11 DIAGNOSIS — C20 MALIGNANT NEOPLASM OF RECTUM: Chronic | ICD-10-CM

## 2024-08-11 DIAGNOSIS — Z92.21 PERSONAL HISTORY OF ANTINEOPLASTIC CHEMOTHERAPY: Chronic | ICD-10-CM

## 2024-08-11 DIAGNOSIS — D49.0 NEOPLASM OF UNSPECIFIED BEHAVIOR OF DIGESTIVE SYSTEM: ICD-10-CM

## 2024-08-11 LAB
ALBUMIN SERPL ELPH-MCNC: 3.4 G/DL — SIGNIFICANT CHANGE UP (ref 3.3–5)
ALBUMIN SERPL ELPH-MCNC: 3.8 G/DL — SIGNIFICANT CHANGE UP (ref 3.3–5)
ALP SERPL-CCNC: 48 U/L — SIGNIFICANT CHANGE UP (ref 40–120)
ALP SERPL-CCNC: 68 U/L — SIGNIFICANT CHANGE UP (ref 40–120)
ALT FLD-CCNC: 18 U/L — SIGNIFICANT CHANGE UP (ref 10–45)
ALT FLD-CCNC: 18 U/L — SIGNIFICANT CHANGE UP (ref 10–45)
ANION GAP SERPL CALC-SCNC: 12 MMOL/L — SIGNIFICANT CHANGE UP (ref 5–17)
ANION GAP SERPL CALC-SCNC: 13 MMOL/L — SIGNIFICANT CHANGE UP (ref 5–17)
ANISOCYTOSIS BLD QL: SLIGHT — SIGNIFICANT CHANGE UP
APTT BLD: 24 SEC — LOW (ref 24.5–35.6)
APTT BLD: 27.5 SEC — SIGNIFICANT CHANGE UP (ref 24.5–35.6)
AST SERPL-CCNC: 30 U/L — SIGNIFICANT CHANGE UP (ref 10–40)
AST SERPL-CCNC: 31 U/L — SIGNIFICANT CHANGE UP (ref 10–40)
BASE EXCESS BLDV CALC-SCNC: -1.1 MMOL/L — SIGNIFICANT CHANGE UP (ref -2–3)
BASE EXCESS BLDV CALC-SCNC: -3.2 MMOL/L — LOW (ref -2–3)
BASOPHILS # BLD AUTO: 0 K/UL — SIGNIFICANT CHANGE UP (ref 0–0.2)
BASOPHILS NFR BLD AUTO: 0 % — SIGNIFICANT CHANGE UP (ref 0–2)
BILIRUB SERPL-MCNC: 0.5 MG/DL — SIGNIFICANT CHANGE UP (ref 0.2–1.2)
BILIRUB SERPL-MCNC: 0.5 MG/DL — SIGNIFICANT CHANGE UP (ref 0.2–1.2)
BUN SERPL-MCNC: 27 MG/DL — HIGH (ref 7–23)
BUN SERPL-MCNC: 38 MG/DL — HIGH (ref 7–23)
BURR CELLS BLD QL SMEAR: PRESENT — SIGNIFICANT CHANGE UP
CA-I SERPL-SCNC: 1.25 MMOL/L — SIGNIFICANT CHANGE UP (ref 1.15–1.33)
CA-I SERPL-SCNC: 1.27 MMOL/L — SIGNIFICANT CHANGE UP (ref 1.15–1.33)
CALCIUM SERPL-MCNC: 8.4 MG/DL — SIGNIFICANT CHANGE UP (ref 8.4–10.5)
CALCIUM SERPL-MCNC: 9.9 MG/DL — SIGNIFICANT CHANGE UP (ref 8.4–10.5)
CHLORIDE BLDV-SCNC: 105 MMOL/L — SIGNIFICANT CHANGE UP (ref 96–108)
CHLORIDE BLDV-SCNC: 106 MMOL/L — SIGNIFICANT CHANGE UP (ref 96–108)
CHLORIDE SERPL-SCNC: 105 MMOL/L — SIGNIFICANT CHANGE UP (ref 96–108)
CHLORIDE SERPL-SCNC: 106 MMOL/L — SIGNIFICANT CHANGE UP (ref 96–108)
CO2 BLDV-SCNC: 23 MMOL/L — SIGNIFICANT CHANGE UP (ref 22–26)
CO2 BLDV-SCNC: 25 MMOL/L — SIGNIFICANT CHANGE UP (ref 22–26)
CO2 SERPL-SCNC: 19 MMOL/L — LOW (ref 22–31)
CO2 SERPL-SCNC: 20 MMOL/L — LOW (ref 22–31)
CREAT SERPL-MCNC: 0.92 MG/DL — SIGNIFICANT CHANGE UP (ref 0.5–1.3)
CREAT SERPL-MCNC: 1.13 MG/DL — SIGNIFICANT CHANGE UP (ref 0.5–1.3)
EGFR: 67 ML/MIN/1.73M2 — SIGNIFICANT CHANGE UP
EGFR: 86 ML/MIN/1.73M2 — SIGNIFICANT CHANGE UP
EOSINOPHIL # BLD AUTO: 0 K/UL — SIGNIFICANT CHANGE UP (ref 0–0.5)
EOSINOPHIL NFR BLD AUTO: 0 % — SIGNIFICANT CHANGE UP (ref 0–6)
GAS PNL BLDA: SIGNIFICANT CHANGE UP
GAS PNL BLDA: SIGNIFICANT CHANGE UP
GAS PNL BLDV: 133 MMOL/L — LOW (ref 136–145)
GAS PNL BLDV: 135 MMOL/L — LOW (ref 136–145)
GAS PNL BLDV: SIGNIFICANT CHANGE UP
GLUCOSE BLDV-MCNC: 159 MG/DL — HIGH (ref 70–99)
GLUCOSE BLDV-MCNC: 195 MG/DL — HIGH (ref 70–99)
GLUCOSE SERPL-MCNC: 142 MG/DL — HIGH (ref 70–99)
GLUCOSE SERPL-MCNC: 204 MG/DL — HIGH (ref 70–99)
HCO3 BLDV-SCNC: 22 MMOL/L — SIGNIFICANT CHANGE UP (ref 22–29)
HCO3 BLDV-SCNC: 24 MMOL/L — SIGNIFICANT CHANGE UP (ref 22–29)
HCT VFR BLD CALC: 40.7 % — SIGNIFICANT CHANGE UP (ref 39–50)
HCT VFR BLDA CALC: 38 % — LOW (ref 39–51)
HCT VFR BLDA CALC: 41 % — SIGNIFICANT CHANGE UP (ref 39–51)
HGB BLD CALC-MCNC: 12.8 G/DL — SIGNIFICANT CHANGE UP (ref 12.6–17.4)
HGB BLD CALC-MCNC: 13.5 G/DL — SIGNIFICANT CHANGE UP (ref 12.6–17.4)
HGB BLD-MCNC: 13.4 G/DL — SIGNIFICANT CHANGE UP (ref 13–17)
INR BLD: 1.01 RATIO — SIGNIFICANT CHANGE UP (ref 0.85–1.18)
INR BLD: 1.05 RATIO — SIGNIFICANT CHANGE UP (ref 0.85–1.18)
LACTATE BLDV-MCNC: 1.4 MMOL/L — SIGNIFICANT CHANGE UP (ref 0.5–2)
LACTATE BLDV-MCNC: 2.6 MMOL/L — HIGH (ref 0.5–2)
LIDOCAIN IGE QN: 101 U/L — HIGH (ref 7–60)
LYMPHOCYTES # BLD AUTO: 0 % — LOW (ref 13–44)
LYMPHOCYTES # BLD AUTO: 0 K/UL — LOW (ref 1–3.3)
MAGNESIUM SERPL-MCNC: 1.8 MG/DL — SIGNIFICANT CHANGE UP (ref 1.6–2.6)
MAGNESIUM SERPL-MCNC: 1.8 MG/DL — SIGNIFICANT CHANGE UP (ref 1.6–2.6)
MANUAL SMEAR VERIFICATION: SIGNIFICANT CHANGE UP
MCHC RBC-ENTMCNC: 30.4 PG — SIGNIFICANT CHANGE UP (ref 27–34)
MCHC RBC-ENTMCNC: 32.9 GM/DL — SIGNIFICANT CHANGE UP (ref 32–36)
MCV RBC AUTO: 92.3 FL — SIGNIFICANT CHANGE UP (ref 80–100)
METAMYELOCYTES # FLD: 0.9 % — HIGH (ref 0–0)
MICROCYTES BLD QL: SLIGHT — SIGNIFICANT CHANGE UP
MONOCYTES # BLD AUTO: 0.33 K/UL — SIGNIFICANT CHANGE UP (ref 0–0.9)
MONOCYTES NFR BLD AUTO: 3.5 % — SIGNIFICANT CHANGE UP (ref 2–14)
NEUTROPHILS # BLD AUTO: 9.04 K/UL — HIGH (ref 1.8–7.4)
NEUTROPHILS NFR BLD AUTO: 90.4 % — HIGH (ref 43–77)
NEUTS BAND # BLD: 5.2 % — SIGNIFICANT CHANGE UP (ref 0–8)
OVALOCYTES BLD QL SMEAR: SLIGHT — SIGNIFICANT CHANGE UP
PCO2 BLDV: 38 MMHG — LOW (ref 42–55)
PCO2 BLDV: 39 MMHG — LOW (ref 42–55)
PH BLDV: 7.36 — SIGNIFICANT CHANGE UP (ref 7.32–7.43)
PH BLDV: 7.4 — SIGNIFICANT CHANGE UP (ref 7.32–7.43)
PHOSPHATE SERPL-MCNC: 2.4 MG/DL — LOW (ref 2.5–4.5)
PHOSPHATE SERPL-MCNC: 3 MG/DL — SIGNIFICANT CHANGE UP (ref 2.5–4.5)
PLAT MORPH BLD: NORMAL — SIGNIFICANT CHANGE UP
PLATELET # BLD AUTO: 239 K/UL — SIGNIFICANT CHANGE UP (ref 150–400)
PO2 BLDV: 44 MMHG — SIGNIFICANT CHANGE UP (ref 25–45)
PO2 BLDV: 64 MMHG — HIGH (ref 25–45)
POIKILOCYTOSIS BLD QL AUTO: SIGNIFICANT CHANGE UP
POTASSIUM BLDV-SCNC: 3.8 MMOL/L — SIGNIFICANT CHANGE UP (ref 3.5–5.1)
POTASSIUM BLDV-SCNC: 4.2 MMOL/L — SIGNIFICANT CHANGE UP (ref 3.5–5.1)
POTASSIUM SERPL-MCNC: 4.4 MMOL/L — SIGNIFICANT CHANGE UP (ref 3.5–5.3)
POTASSIUM SERPL-MCNC: 4.5 MMOL/L — SIGNIFICANT CHANGE UP (ref 3.5–5.3)
POTASSIUM SERPL-SCNC: 4.4 MMOL/L — SIGNIFICANT CHANGE UP (ref 3.5–5.3)
POTASSIUM SERPL-SCNC: 4.5 MMOL/L — SIGNIFICANT CHANGE UP (ref 3.5–5.3)
PROCALCITONIN SERPL-MCNC: 5.9 NG/ML — HIGH (ref 0.02–0.1)
PROT SERPL-MCNC: 5.5 G/DL — LOW (ref 6–8.3)
PROT SERPL-MCNC: 6.4 G/DL — SIGNIFICANT CHANGE UP (ref 6–8.3)
PROTHROM AB SERPL-ACNC: 11.1 SEC — SIGNIFICANT CHANGE UP (ref 9.5–13)
PROTHROM AB SERPL-ACNC: 11.5 SEC — SIGNIFICANT CHANGE UP (ref 9.5–13)
RBC # BLD: 4.41 M/UL — SIGNIFICANT CHANGE UP (ref 4.2–5.8)
RBC # FLD: 14.6 % — HIGH (ref 10.3–14.5)
RBC BLD AUTO: ABNORMAL
SAO2 % BLDV: 75.2 % — SIGNIFICANT CHANGE UP (ref 67–88)
SAO2 % BLDV: 92.7 % — HIGH (ref 67–88)
SCHISTOCYTES BLD QL AUTO: SLIGHT — SIGNIFICANT CHANGE UP
SODIUM SERPL-SCNC: 137 MMOL/L — SIGNIFICANT CHANGE UP (ref 135–145)
SODIUM SERPL-SCNC: 138 MMOL/L — SIGNIFICANT CHANGE UP (ref 135–145)
WBC # BLD: 9.46 K/UL — SIGNIFICANT CHANGE UP (ref 3.8–10.5)
WBC # FLD AUTO: 9.46 K/UL — SIGNIFICANT CHANGE UP (ref 3.8–10.5)

## 2024-08-11 PROCEDURE — 99223 1ST HOSP IP/OBS HIGH 75: CPT

## 2024-08-11 PROCEDURE — 74177 CT ABD & PELVIS W/CONTRAST: CPT | Mod: 26,MC

## 2024-08-11 PROCEDURE — 71045 X-RAY EXAM CHEST 1 VIEW: CPT | Mod: 26

## 2024-08-11 PROCEDURE — 74176 CT ABD & PELVIS W/O CONTRAST: CPT | Mod: 26,59,MC

## 2024-08-11 PROCEDURE — 43840 GSTRRPHY SUTR DUOL/GSTR ULCR: CPT

## 2024-08-11 PROCEDURE — 99285 EMERGENCY DEPT VISIT HI MDM: CPT | Mod: FS

## 2024-08-11 RX ORDER — ENOXAPARIN SODIUM 100 MG/ML
40 INJECTION SUBCUTANEOUS EVERY 24 HOURS
Refills: 0 | Status: DISCONTINUED | OUTPATIENT
Start: 2024-08-11 | End: 2024-08-19

## 2024-08-11 RX ORDER — PIPERACILLIN SODIUM AND TAZOBACTAM SODIUM 3; .375 G/15ML; G/15ML
3.38 INJECTION, POWDER, FOR SOLUTION INTRAVENOUS ONCE
Refills: 0 | Status: COMPLETED | OUTPATIENT
Start: 2024-08-11 | End: 2024-08-11

## 2024-08-11 RX ORDER — ACETAMINOPHEN 325 MG/1
1000 TABLET ORAL EVERY 8 HOURS
Refills: 0 | Status: DISCONTINUED | OUTPATIENT
Start: 2024-08-11 | End: 2024-08-12

## 2024-08-11 RX ORDER — FLUCONAZOLE 150 MG/1
TABLET ORAL
Refills: 0 | Status: DISCONTINUED | OUTPATIENT
Start: 2024-08-11 | End: 2024-08-16

## 2024-08-11 RX ORDER — FLUCONAZOLE 150 MG/1
400 TABLET ORAL EVERY 24 HOURS
Refills: 0 | Status: DISCONTINUED | OUTPATIENT
Start: 2024-08-12 | End: 2024-08-16

## 2024-08-11 RX ORDER — PIPERACILLIN SODIUM AND TAZOBACTAM SODIUM 3; .375 G/15ML; G/15ML
3.38 INJECTION, POWDER, FOR SOLUTION INTRAVENOUS ONCE
Refills: 0 | Status: COMPLETED | OUTPATIENT
Start: 2024-08-11 | End: 2024-08-12

## 2024-08-11 RX ORDER — PIPERACILLIN SODIUM AND TAZOBACTAM SODIUM 3; .375 G/15ML; G/15ML
3.38 INJECTION, POWDER, FOR SOLUTION INTRAVENOUS EVERY 8 HOURS
Refills: 0 | Status: DISCONTINUED | OUTPATIENT
Start: 2024-08-12 | End: 2024-08-17

## 2024-08-11 RX ORDER — ACETAMINOPHEN 325 MG/1
1000 TABLET ORAL ONCE
Refills: 0 | Status: COMPLETED | OUTPATIENT
Start: 2024-08-11 | End: 2024-08-11

## 2024-08-11 RX ORDER — SODIUM CHLORIDE 9 MG/ML
1000 INJECTION INTRAMUSCULAR; INTRAVENOUS; SUBCUTANEOUS ONCE
Refills: 0 | Status: COMPLETED | OUTPATIENT
Start: 2024-08-11 | End: 2024-08-11

## 2024-08-11 RX ORDER — PANTOPRAZOLE SODIUM 40 MG
40 TABLET, DELAYED RELEASE (ENTERIC COATED) ORAL DAILY
Refills: 0 | Status: DISCONTINUED | OUTPATIENT
Start: 2024-08-11 | End: 2024-08-11

## 2024-08-11 RX ORDER — HYDROMORPHONE HYDROCHLORIDE 2 MG/1
0.5 TABLET ORAL
Refills: 0 | Status: DISCONTINUED | OUTPATIENT
Start: 2024-08-11 | End: 2024-08-16

## 2024-08-11 RX ORDER — METRONIDAZOLE 250 MG
500 TABLET ORAL EVERY 8 HOURS
Refills: 0 | Status: DISCONTINUED | OUTPATIENT
Start: 2024-08-11 | End: 2024-08-12

## 2024-08-11 RX ORDER — ENOXAPARIN SODIUM 100 MG/ML
40 INJECTION SUBCUTANEOUS EVERY 24 HOURS
Refills: 0 | Status: DISCONTINUED | OUTPATIENT
Start: 2024-08-11 | End: 2024-08-11

## 2024-08-11 RX ORDER — PANTOPRAZOLE SODIUM 40 MG
40 TABLET, DELAYED RELEASE (ENTERIC COATED) ORAL
Refills: 0 | Status: DISCONTINUED | OUTPATIENT
Start: 2024-08-11 | End: 2024-08-17

## 2024-08-11 RX ORDER — FLUCONAZOLE 150 MG/1
400 TABLET ORAL ONCE
Refills: 0 | Status: COMPLETED | OUTPATIENT
Start: 2024-08-11 | End: 2024-08-11

## 2024-08-11 RX ORDER — HYDROMORPHONE HYDROCHLORIDE 2 MG/1
1 TABLET ORAL ONCE
Refills: 0 | Status: DISCONTINUED | OUTPATIENT
Start: 2024-08-11 | End: 2024-08-11

## 2024-08-11 RX ORDER — CHLORHEXIDINE GLUCONATE 40 MG/ML
1 SOLUTION TOPICAL
Refills: 0 | Status: DISCONTINUED | OUTPATIENT
Start: 2024-08-11 | End: 2024-08-19

## 2024-08-11 RX ORDER — HYDROMORPHONE HYDROCHLORIDE 2 MG/1
0.25 TABLET ORAL
Refills: 0 | Status: DISCONTINUED | OUTPATIENT
Start: 2024-08-11 | End: 2024-08-16

## 2024-08-11 RX ADMIN — HYDROMORPHONE HYDROCHLORIDE 1 MILLIGRAM(S): 2 TABLET ORAL at 04:13

## 2024-08-11 RX ADMIN — HYDROMORPHONE HYDROCHLORIDE 1 MILLIGRAM(S): 2 TABLET ORAL at 05:22

## 2024-08-11 RX ADMIN — ACETAMINOPHEN 1000 MILLIGRAM(S): 325 TABLET ORAL at 07:13

## 2024-08-11 RX ADMIN — HYDROMORPHONE HYDROCHLORIDE 0.25 MILLIGRAM(S): 2 TABLET ORAL at 23:18

## 2024-08-11 RX ADMIN — HYDROMORPHONE HYDROCHLORIDE 1 MILLIGRAM(S): 2 TABLET ORAL at 07:13

## 2024-08-11 RX ADMIN — HYDROMORPHONE HYDROCHLORIDE 0.25 MILLIGRAM(S): 2 TABLET ORAL at 23:48

## 2024-08-11 RX ADMIN — Medication 75 MILLILITER(S): at 20:37

## 2024-08-11 RX ADMIN — Medication 100 MILLIGRAM(S): at 23:18

## 2024-08-11 RX ADMIN — PIPERACILLIN SODIUM AND TAZOBACTAM SODIUM 200 GRAM(S): 3; .375 INJECTION, POWDER, FOR SOLUTION INTRAVENOUS at 20:38

## 2024-08-11 RX ADMIN — ACETAMINOPHEN 400 MILLIGRAM(S): 325 TABLET ORAL at 20:38

## 2024-08-11 RX ADMIN — ACETAMINOPHEN 400 MILLIGRAM(S): 325 TABLET ORAL at 04:15

## 2024-08-11 RX ADMIN — SODIUM CHLORIDE 1000 MILLILITER(S): 9 INJECTION INTRAMUSCULAR; INTRAVENOUS; SUBCUTANEOUS at 04:14

## 2024-08-11 RX ADMIN — FLUCONAZOLE 100 MILLIGRAM(S): 150 TABLET ORAL at 20:45

## 2024-08-11 RX ADMIN — ACETAMINOPHEN 1000 MILLIGRAM(S): 325 TABLET ORAL at 21:08

## 2024-08-11 NOTE — H&P ADULT - NSHPPHYSICALEXAM_GEN_ALL_CORE
VITAL SIGNS:  Vital Signs Last 24 Hrs  T(C): 36.8 (11 Aug 2024 13:40), Max: 36.8 (11 Aug 2024 13:40)  T(F): 98.3 (11 Aug 2024 13:40), Max: 98.3 (11 Aug 2024 13:40)  HR: 67 (11 Aug 2024 13:40) (62 - 72)  BP: 129/78 (11 Aug 2024 13:40) (110/53 - 135/74)  BP(mean): 91 (11 Aug 2024 13:40) (76 - 93)  RR: 20 (11 Aug 2024 13:40) (17 - 20)  SpO2: 96% (11 Aug 2024 13:40) (96% - 100%)    Parameters below as of 11 Aug 2024 13:40  Patient On (Oxygen Delivery Method): room air    PHYSICAL EXAM:    General: NAD, Sitting in bed comfortably  HEENT: NC/AT, EOMI  Cardio: RRR  Resp: Good effort,  GI/Abd: Soft, NT/ND, no rebound/guarding, no masses palpated  Ostomy: pink and viable, gas and minimal stool in bag

## 2024-08-11 NOTE — ED ADULT NURSE NOTE - OBJECTIVE STATEMENT
Break coverage RN. PT is a 78yo M coming from home bibems c/o abd pain. PT states that he recently had chemotherapy yesterday for throat CA Break coverage RN. PT is a 80yo M coming from home bibems c/o abd pain. PT states that he recently had chemotherapy yesterday for throat CA and developed severe abd pain in umbilical area. PT states unable to tolerate PO and has had recent 20lb weight loss, L sided colostomy bag in place on arrival. PMH of HTN, throat CA. PT A,Ox4, ambulatory at baseline. Respirations even and unlabored, abd firm, nondistended and tender upon palp, skin warm, dry and intact, [place]. Denies HA, CP, SOB,  fever, chills and urinary symptoms. Stretcher locked in lowest position, appropriate side rails up for safety, pt instructed to call for RN if anything needed.

## 2024-08-11 NOTE — H&P ADULT - ASSESSMENT
79M w/ PMHx of rectal CA s/p APR and right tonsil SCC on XRT who presents with findings concerning for gastric vs duodenal perforation.    PLAN:  - Admit to [facility] under Dr. Akhtar  - Emergent OR for exploratory laparotomy, possible Kash patch, possible bowel resection  - NPO/IVFs  - IV abx  - VTE ppx  - Pain control as needed    EMERALD Harris, PGY-5  [facility]   Acute Care Surgery   [id number]

## 2024-08-11 NOTE — ED ADULT NURSE NOTE - NSFALLRISKINTERV_ED_ALL_ED
Assistance OOB with selected safe patient handling equipment if applicable/Assistance with ambulation/Communicate fall risk and risk factors to all staff, patient, and family/Monitor gait and stability/Provide visual cue: yellow wristband, yellow gown, etc/Reinforce activity limits and safety measures with patient and family/Call bell, personal items and telephone in reach/Instruct patient to call for assistance before getting out of bed/chair/stretcher/Non-slip footwear applied when patient is off stretcher/[place] to call system/Physically safe environment - no spills, clutter or unnecessary equipment/Purposeful Proactive Rounding/Room/bathroom lighting operational, light cord in reach

## 2024-08-11 NOTE — H&P ADULT - HISTORY OF PRESENT ILLNESS
79M w/ PMHx of rectal CA s/p APR in 2010 by Dr. 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 Patient denies nausea or vomiting. 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 No fevers/chills, chest pain/shortness of breath, or dizziness/lightheadedness.    In the ED, patient was found to be afebrile, hemodynamically stable, labs were largely unremarkable, CTAP with IV contrast revealed pneumoperitoneum with concerns for hollow viscus. Repeat CTAP with PO contrast revealed slight oral contrast extravasation, consistent with hollow viscus.

## 2024-08-11 NOTE — ED PROVIDER NOTE - OBJECTIVE STATEMENT
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  Denies fever, chest pain, shortness of breath, nausea, vomiting, diarrhea, hematuria, dysuria, or any other symptoms at this time.  [employer] PGY 2

## 2024-08-11 NOTE — ED ADULT NURSE REASSESSMENT NOTE - NS ED NURSE REASSESS COMMENT FT1
Report received from ASHISH Hoskins and ASHISH Smith. PT is resting comfortably in bed, breathing unlabored on room air, and speaking in complete sentences. Updated PT on plan of care. Awaiting CT. Safety and comfort maintained. Call bell within reach. Family at the bedside.

## 2024-08-11 NOTE — BRIEF OPERATIVE NOTE - OPERATION/FINDINGS
Operation: Exploratory laparotomy, Kash patch with Falciform ligament to anterior pyloric perforation.    Operative Details: Midline supraumbilical exploratory laparotomy, lysis of adhesions of omentum to anterior abdominal wall, pinhole perforation in the anterior pylorus noted with bile spillage. Lesser sac was entered via gastro colic ligament and no perforations noted in the posterior gastric wall. Decision made to creat a Kash patch using the falciform ligament as it layed naturally over the site of perforation. This was done in standard fashion with 3 interrupted Lembert stitches using 2-0 silk. Abdomen was copiously irrigated. 19 Fr jovani drain x2 left in lesser sac and anterior to the Kash patch repair, respectively. Fascia closed with running #1 Maxon sutures and skin closed with interrupted 3-0vycril derpal stitches. Prevena VAC placed.

## 2024-08-11 NOTE — BRIEF OPERATIVE NOTE - NSICDXBRIEFPROCEDURE_GEN_ALL_CORE_FT
PROCEDURES:  Repair of perforated pyloric ulcer using Kash patch 11-Aug-2024 19:01:44  Braden Thompson

## 2024-08-11 NOTE — CONSULT NOTE ADULT - ASSESSMENT
79M w/ PMHx of rectal CA s/p APR in 2010 by Dr. DEENA Hogan and recently diagnosed Right tonsill SCC (sees ENT Dr Camarillo) on active XRT who presents to the ED with 1 day of sudden onset diffuse abdominal pain. CTAP with PO contrast revealed slight oral contrast extravasation, consistent with hollow viscus. ENT consulted for airway eval prior to OR given history of active XRT for right tonsil SCC. Flexible laryngoscopy performed which showed pooling of secretions in vallecula, posterior pharyngeal wall, esophageal inlet. Airway patent.

## 2024-08-11 NOTE — CONSULT NOTE ADULT - SUBJECTIVE AND OBJECTIVE BOX
CC: airway eval     HPI: 79M w/ PMHx of rectal CA s/p APR in 2010 by Dr. DEENA Hogan and recently diagnosed Right tonsill SCC (sees ENT Dr Camarillo) on active XRT who presents to the ED with 1 day of sudden onset diffuse abdominal pain. Patient denies nausea or vomiting. 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 No fevers/chills, chest pain/shortness of breath, or dizziness/lightheadedness.    In the ED, patient was found to be afebrile, hemodynamically stable, labs were largely unremarkable, CTAP with IV contrast revealed pneumoperitoneum with concerns for hollow viscus. Repeat CTAP with PO contrast revealed slight oral contrast extravasation, consistent with hollow viscus.     ENT consulted for airway eval prior to OR given history of active XRT for right tonsil SCC.       PAST MEDICAL & SURGICAL HISTORY:  Rectal Neoplasm  treated with oral chemothearpy and radiation 5/10- 6/10        Hemorrhoid      Malignant neoplasm of oropharynx      Colostomy status      Neck mass      S/P Colonoscopy  3/10      Rectal cancer      S/P colostomy      History of cancer chemotherapy      S/P radiation therapy        Allergies    No Known Allergies    Intolerances      MEDICATIONS  (STANDING):    MEDICATIONS  (PRN):      FAMILY HISTORY:  Father  Still living? Unknown  FH: lung cancer, Age at diagnosis: Age Unknown.     Social History:  · Substance use	No     Tobacco Screening:  · Core Measure Site	No      ROS:   ENT: all negative except as noted in HPI   CV: denies palpitations  Pulm: denies SOB, cough, hemoptysis  GI: denies change in appetite, indigestion, n/v  : denies pertinent urinary symptoms, urgency  Neuro: denies numbness/tingling, loss of sensation  Psych: denies anxiety  MS: denies muscle weakness, instability  Heme: denies easy bruising or bleeding  Endo: denies heat/cold intolerance, excessive sweating  Vascular: denies LE edema    Vital Signs Last 24 Hrs  T(C): 36.8 (11 Aug 2024 13:40), Max: 36.8 (11 Aug 2024 13:40)  T(F): 98.3 (11 Aug 2024 13:40), Max: 98.3 (11 Aug 2024 13:40)  HR: 67 (11 Aug 2024 13:40) (62 - 72)  BP: 129/78 (11 Aug 2024 13:40) (110/53 - 135/74)  BP(mean): 91 (11 Aug 2024 13:40) (76 - 93)  RR: 20 (11 Aug 2024 13:40) (17 - 20)  SpO2: 96% (11 Aug 2024 13:40) (96% - 100%)    Parameters below as of 11 Aug 2024 13:40  Patient On (Oxygen Delivery Method): room air                              13.4   9.46  )-----------( 239      ( 11 Aug 2024 04:27 )             40.7    08-11    138  |  105  |  38<H>  ----------------------------<  204<H>  4.5   |  20<L>  |  1.13    Ca    9.9      11 Aug 2024 04:27  Phos  2.4     08-11  Mg     1.8     08-11    TPro  6.4  /  Alb  3.8  /  TBili  0.5  /  DBili  x   /  AST  31  /  ALT  18  /  AlkPhos  68  08-11   PT/INR - ( 11 Aug 2024 08:35 )   PT: 11.1 sec;   INR: 1.01 ratio         PTT - ( 11 Aug 2024 08:35 )  PTT:24.0 sec    PHYSICAL EXAM:  Gen: NAD  Skin: No rashes, bruises, or lesions  Head: Normocephalic, Atraumatic  Face: no edema, erythema, or fluctuance. Parotid glands soft without mass  Eyes: no scleral injection  Nose: Nares bilaterally patent, no discharge  Mouth: No Stridor / Drooling / Trismus.  Mucosa moist, tongue/uvula midline  Neck: Flat, supple, no lymphadenopathy, trachea midline, no masses  Lymphatic: No lymphadenopathy  Resp: breathing easily, no stridor  CV: no peripheral edema/cyanosis  GI: nondistended   Peripheral vascular: no JVD or edema  Neuro: facial nerve intact, no facial droop        Reason for Flexible Laryngoscopy: Airway eval     Patient was unable to cooperate with mirror.  +pooling of secretions in vallecula, posterior pharyngeal wall, esophageal inlet. Nasopharynx, oropharynx, and hypopharynx clear, no bleeding. Tongue base, epiglottis, and subglottis appear normal. No erythema, edema, masses or lesions. Airway patent, no foreign body visualized. No glottic/supraglottic edema. True vocal cords, arytenoids, vestibular folds, ventricles, pyriform sinuses, and aryepiglottic folds appear normal bilaterally. Vocal cords mobile with good contact b/l.

## 2024-08-11 NOTE — CONSULT NOTE ADULT - SUBJECTIVE AND OBJECTIVE BOX
Surgical Critical Care Consultation    History of Present Illness  Mr. Fulton is a 79 year old man with a history of locally advanced rectal adenocarcinoma status post neoadjuvant chemoradiotherapy and abdominoperineal resection (Dr. 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 Intra-operatively he was noted to have a pinhole perforation of the anterior pylorus with spillage of bile, treated with a Kash patch using the falciform ligament, left with two drains (#1 in the lesser sac due to presence of bilious succus prior to washout, #2 overlying the repair), skin closed in interrupted fashion with a Prevena wound vac. He is now brought to the surgical intensive care unit post-operatively for close hemodynamic monitoring.     Home Medications  metoclopramide 10 mg oral tablet: 1 tab(s) orally every 6 hours as needed for  nausea take 1 tablet every 6 hours AS NEEDED for nausea (18 Jun 2024 12:13)  NIFEdipine 30 mg oral tablet, extended release: 1 tab(s) orally once a day (28 Jun 2024 15:12)    Review of Systems  Constitutional:  HEENT:  Cardiovascular:  Respiratory:   Gastrointestinal:  Renal/Genitourinary:   Musculoskeletal:   Skin:       Vital Signs  T(C): 36.3 (11 Aug 2024 19:00), Max: 36.9 (11 Aug 2024 18:45)  T(F): 97.4 (11 Aug 2024 19:00), Max: 98.4 (11 Aug 2024 18:45)  HR: 61 (11 Aug 2024 21:00) (60 - 78)  BP: 129/78 (11 Aug 2024 13:40) (110/53 - 135/74)  BP(mean): 91 (11 Aug 2024 13:40) (76 - 93)  ABP: 120/54 (11 Aug 2024 21:00) (109/52 - 146/66)  ABP(mean): 77 (11 Aug 2024 21:00) (72 - 97)  RR: 24 (11 Aug 2024 21:00) (12 - 24)  SpO2: 94% (11 Aug 2024 21:00) (94% - 100%)    Physical Exam  General:  Neurologic:  Psychiatric:  Respiratory:  Cardiovascular:  Abdomen:  Extremities:  Dermatologic:    Laboratory Studies  CBC Basic (08-11 @ 04:27)  WBC: 9.46 Hgb: 13.4 Hct: 40.7 Plt: 239    Metabolic Panel (08-11 @ 20:36)  137  |  106  |  27  ----------------------------<  142  4.4   |  19  |  0.92  Ca: 8.4/Phos: 3.0/Magnesium: 1.8    Liver Chemistries (08-11 @ 20:36)  Total protein 5.5 | Albumin 3.4  TBili 0.5 | DBili x  AST 30 | ALT 18 | AlkPhos 48    Coagulation Studies (08-11 @ 20:37)  PT/INR: 11.5/1.05, aPTT: 27.5    Arterial Blood Gas (08-11 @ 20:26)  pH: 7.40  /  pCO2: 35    /  pO2: 128   / HCO3: 22    / Base Excess: -2.6  /  SaO2: 99.8  /  Lactate: x     Surgical Critical Care Consultation    History of Present Illness  Mr. Fulton is a 79 year old man with a history of locally advanced rectal adenocarcinoma status post neoadjuvant chemoradiotherapy and abdominoperineal resection (Dr. 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 Intra-operatively he was noted to have a pinhole perforation of the anterior pylorus with spillage of bile, treated with a Kash patch using the falciform ligament, left with two drains (#1 in the lesser sac due to presence of bilious succus prior to washout, #2 overlying the repair), skin closed in interrupted fashion with a Prevena wound vac. He is now brought to the surgical intensive care unit post-operatively for close hemodynamic monitoring.     Home Medications  metoclopramide 10 mg oral tablet: 1 tab(s) orally every 6 hours as needed for  nausea take 1 tablet every 6 hours AS NEEDED for nausea (18 Jun 2024 12:13)  NIFEdipine 30 mg oral tablet, extended release: 1 tab(s) orally once a day (28 Jun 2024 15:12)    Vital Signs  T(C): 36.3 (11 Aug 2024 19:00), Max: 36.9 (11 Aug 2024 18:45)  T(F): 97.4 (11 Aug 2024 19:00), Max: 98.4 (11 Aug 2024 18:45)  HR: 61 (11 Aug 2024 21:00) (60 - 78)  BP: 129/78 (11 Aug 2024 13:40) (110/53 - 135/74)  BP(mean): 91 (11 Aug 2024 13:40) (76 - 93)  ABP: 120/54 (11 Aug 2024 21:00) (109/52 - 146/66)  ABP(mean): 77 (11 Aug 2024 21:00) (72 - 97)  RR: 24 (11 Aug 2024 21:00) (12 - 24)  SpO2: 94% (11 Aug 2024 21:00) (94% - 100%)    Physical Exam  General: Resting comfortably in bed in no acute distress.   Neurologic: Alert and oriented, appropriately responds to questions.   Respiratory: Equal chest wall expansion bilaterally with no accessory muscle use and no grossly increased work of breathing.   Cardiovascular: Regular rate and rhythm by continuous telemetry confirmed by palpation of peripheral pulse.   Abdomen: Soft and nondistended, appropriate incisional tenderness to palpation.   Extremities: Warm and well-perfused.     Laboratory Studies  CBC Basic (08-11 @ 04:27)  WBC: 9.46 Hgb: 13.4 Hct: 40.7 Plt: 239    Metabolic Panel (08-11 @ 20:36)  137  |  106  |  27  ----------------------------<  142  4.4   |  19  |  0.92  Ca: 8.4/Phos: 3.0/Magnesium: 1.8    Liver Chemistries (08-11 @ 20:36)  Total protein 5.5 | Albumin 3.4  TBili 0.5 | DBili x  AST 30 | ALT 18 | AlkPhos 48    Coagulation Studies (08-11 @ 20:37)  PT/INR: 11.5/1.05, aPTT: 27.5    Arterial Blood Gas (08-11 @ 20:26)  pH: 7.40  /  pCO2: 35    /  pO2: 128   / HCO3: 22    / Base Excess: -2.6  /  SaO2: 99.8  /  Lactate: x     Surgical Critical Care Consultation    History of Present Illness  Mr. Fulton is a 79 year old man with a history of locally advanced rectal adenocarcinoma status post neoadjuvant chemoradiotherapy and abdominoperineal resection (Dr. 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 Intra-operatively he was noted to have a pinhole perforation of the anterior pylorus with spillage of bile, treated with a Kash patch using the falciform ligament, left with two drains (#1 in the lesser sac due to presence of bilious succus prior to washout, #2 overlying the repair), skin closed in interrupted fashion with a Prevena wound vac. He is now brought to the surgical intensive care unit post-operatively for close hemodynamic monitoring.     Home Medications  metoclopramide 10 mg oral tablet: 1 tab(s) orally every 6 hours as needed for  nausea take 1 tablet every 6 hours AS NEEDED for nausea (18 Jun 2024 12:13)  NIFEdipine 30 mg oral tablet, extended release: 1 tab(s) orally once a day (28 Jun 2024 15:12)    Vital Signs  T(C): 36.3 (11 Aug 2024 19:00), Max: 36.9 (11 Aug 2024 18:45)  T(F): 97.4 (11 Aug 2024 19:00), Max: 98.4 (11 Aug 2024 18:45)  HR: 61 (11 Aug 2024 21:00) (60 - 78)  BP: 129/78 (11 Aug 2024 13:40) (110/53 - 135/74)  BP(mean): 91 (11 Aug 2024 13:40) (76 - 93)  ABP: 120/54 (11 Aug 2024 21:00) (109/52 - 146/66)  ABP(mean): 77 (11 Aug 2024 21:00) (72 - 97)  RR: 24 (11 Aug 2024 21:00) (12 - 24)  SpO2: 94% (11 Aug 2024 21:00) (94% - 100%)    Physical Exam  General: Resting comfortably in bed in no acute distress.   Neurologic: Alert and oriented, appropriately responds to questions.   Respiratory: Equal chest wall expansion bilaterally with no accessory muscle use and no grossly increased work of breathing.   Cardiovascular: Regular rate and rhythm by continuous telemetry confirmed by palpation of peripheral pulse.   Abdomen: Soft and nondistended, appropriate incisional tenderness to palpation. Wound vac maintaining suction.   Extremities: Warm and well-perfused.     Laboratory Studies  CBC Basic (08-11 @ 04:27)  WBC: 9.46 Hgb: 13.4 Hct: 40.7 Plt: 239    Metabolic Panel (08-11 @ 20:36)  137  |  106  |  27  ----------------------------<  142  4.4   |  19  |  0.92  Ca: 8.4/Phos: 3.0/Magnesium: 1.8    Liver Chemistries (08-11 @ 20:36)  Total protein 5.5 | Albumin 3.4  TBili 0.5 | DBili x  AST 30 | ALT 18 | AlkPhos 48    Coagulation Studies (08-11 @ 20:37)  PT/INR: 11.5/1.05, aPTT: 27.5    Arterial Blood Gas (08-11 @ 20:26)  pH: 7.40  /  pCO2: 35    /  pO2: 128   / HCO3: 22    / Base Excess: -2.6  /  SaO2: 99.8  /  Lactate: x

## 2024-08-11 NOTE — ED ADULT NURSE REASSESSMENT NOTE - NS ED NURSE REASSESS COMMENT FT1
War room informs ED RN pt bradycardic to 40s, ED RN in room assessing pt, HR improved to 80s, pt resting in stretcher denying pain or discomfort. MD Todd made aware, no interventions at this time.

## 2024-08-11 NOTE — CHART NOTE - NSCHARTNOTEFT_GEN_A_CORE
POST-OPERATIVE NOTE    Subjective:  Patient is s/p exploratory laparotomy with benedict patch repair with Falciform ligament to anterior pyloric perforation. Patient reports feeling okay and denies chest pain, shortness of breath, nausea and vomiting. He states he has not passed flatus yet.     Vital Signs Last 24 Hrs  T(C): 36.3 (11 Aug 2024 19:00), Max: 36.9 (11 Aug 2024 18:45)  T(F): 97.4 (11 Aug 2024 19:00), Max: 98.4 (11 Aug 2024 18:45)  HR: 61 (11 Aug 2024 21:00) (60 - 78)  BP: 129/78 (11 Aug 2024 13:40) (110/53 - 135/74)  BP(mean): 91 (11 Aug 2024 13:40) (76 - 93)  RR: 24 (11 Aug 2024 21:00) (12 - 24)  SpO2: 94% (11 Aug 2024 21:00) (94% - 100%)    Parameters below as of 11 Aug 2024 20:45  Patient On (Oxygen Delivery Method): room air    I&O's Detail    11 Aug 2024 07:01  -  12 Aug 2024 01:16  --------------------------------------------------------  IN:    Lactated Ringers: 225 mL  Total IN: 225 mL    OUT:    Indwelling Catheter - Urethral (mL): 250 mL  Total OUT: 250 mL    Total NET: -25 mL    MEDICATIONS  fluconAZOLE IVPB 400  fluconAZOLE IVPB   metroNIDAZOLE  IVPB 500  piperacillin/tazobactam IVPB.. 3.375  enoxaparin Injectable 40  fluconAZOLE IVPB 400  fluconAZOLE IVPB   metroNIDAZOLE  IVPB 500  piperacillin/tazobactam IVPB.. 3.375    PAST MEDICAL & SURGICAL HISTORY:  Rectal Neoplasm  treated with oral chemothearpy and radiation 5/10- 6/10    Hemorrhoid      Malignant neoplasm of oropharynx      Colostomy status      Neck mass      S/P Colonoscopy  3/10      Rectal cancer      S/P colostomy      History of cancer chemotherapy      S/P radiation therapy    Physical Exam:  General: NAD, resting comfortably in bed, NGT in place and on suction  Pulmonary: Nonlabored breathing, no respiratory distress  Cardiovascular: Extremities well perfused  Abdominal: soft, appropriately tender, wound vac in place, 2x drains with serosanguinous fluid.    LABS:                        13.4   9.46  )-----------( 239      ( 11 Aug 2024 04:27 )             40.7     08-11    137  |  106  |  27<H>  ----------------------------<  142<H>  4.4   |  19<L>  |  0.92    Ca    8.4      11 Aug 2024 20:36  Phos  3.0     08-11  Mg     1.8     08-11    TPro  5.5<L>  /  Alb  3.4  /  TBili  0.5  /  DBili  x   /  AST  30  /  ALT  18  /  AlkPhos  48  08-11    PT/INR - ( 11 Aug 2024 20:37 )   PT: 11.5 sec;   INR: 1.05 ratio         PTT - ( 11 Aug 2024 20:37 )  PTT:27.5 sec  CAPILLARY BLOOD GLUCOSE    Radiology and Additional Studies:    Assessment:  The patient is a 79y Male who is now several hours post-op from an exploratory laparotomy with benedict patch repair. Patient recovering appropriately. VSS, BELL drains with serosanguinous fluid.     Plan:  - Pain control as needed  - DVT ppx lovenox  - OOB and ambulating as tolerated  - F/u AM labs    ACS/Trauma Surgery   [id number]

## 2024-08-11 NOTE — CONSULT NOTE ADULT - PROBLEM SELECTOR RECOMMENDATION 9
- Clear for intubation from ENT standpoint   - Pt will follow up with [facility] Dr. Camarillo, call [phone] to make an appointment.

## 2024-08-11 NOTE — CONSULT NOTE ADULT - ASSESSMENT
ASSESSMENT:  79M w/ PMHx of rectal CA s/p APR in 2010  and recently diagnosed Right tonsill SCC. Now diag with  pneumoperitoneum with pyloric ulcer s/p repair of perforated pyloric ulcer using Kash patch and extubated upon arrival.     PLAN:    Neuro:  - Multimodal pain control w/ Tylenol and Dilaudid     Resp:  - Out of bed to chair, ambulate as tolerated, and incentive spirometry to prevent atelectasis  - 2L NC     CV:  - MAP > 65 mmHg    GI: # pyloric ulcer  - NPO with NGT to low suction for bowel rest x 5 days   - TPN consult and PICC placed   - Protonix BID for H.Pylori ppx pending stool antigen test    Renal:  - Monitor I&Os and electrolytes w/ repletions as necessary  - Sánchez   - LR@75    Heme:  - Monitor CBC and coags  - Lovenox for VTE prophylaxis    ID: # ppx treat H. Pylori due to pyloric ulcer perforation   - Monitor for clinical evidence of active infection  - Monitor WBC, temperature, and procalcitonin  - Flagyl, ZOsyn, Vancomycin, Fluconazole       Endo:   - MAREN     Code Status: Full code     Lines  - L Radial A-line     SICU   [id number] Mr. Fulton is a 79 year old man with a history of rectal cancer status post APR in 2010 with Dr. Hogan and recently diagnosed right tonsillar squamous cell carcinoma presenting with abdominal pain found to have a perforated pyloric ulcer status post exploratory laparotomy and falciform Kash patch (Dr. Akhtar, 8/11/2024).     Plan by system   Neurologic  - Alert and oriented, appropriately responds to questions.   - Acetaminophen and hydromorphone IV for pain control.     Respiratory  - Saturating well on 2L nasal cannula.   - Encourage time out of bed, ambulation as tolerated, and incentive spirometry to reduce risk of atelectasis.     Cardiovascular  - MAP goal > 65.   - Hemodynamically stable without IV vasopressors.   - Home nifedipine currently held.     Gastrointestinal  - NPO for five days post-operatively.   - Nasogastric tube placed in OR, confirmed by palpation and chest radiograph.   - Upper GI series to confirm no leak following repair to be obtained on post-operative day five (8/16).   - TPN consult placed for chronic malnutrition and planned extended NPO status. Will require PICC.   - Pantoprazole twice daily for ulcer prophylaxis.   - H pylori stool antigen test pending.   - Avoid NSAIDs.    Renal/Genitourinary  - Monitor intake and output.   - Indwelling catheter for strict urine output monitoring.   - Check electrolytes with lab draws, replete as necessary.   - Continue LR @ 75 cc/hr.     Hematologic/Oncologic  - Monitor CBC and coags  - Lovenox for VTE prophylaxis    Infectious  - Monitor for clinical evidence of active infection.  - Trend white blood cell count and track temperature.   - Continue broad spectrum antibiotics for hollow viscus perforation - piperacillin/tazobactam, metronidazole, fluconazole.     Endocrine  - No active issues.     Code Status  Full code.    Disposition  Surgical intensive care unit.     Bridger Bello, PGY-2  Surgical Critical Care ([id number])

## 2024-08-11 NOTE — H&P ADULT - NSHPLABSRESULTS_GEN_ALL_CORE
LABS:                        13.4   9.46  )-----------( 239      ( 11 Aug 2024 04:27 )             40.7     11 Aug 2024 04:27    138    |  105    |  38     ----------------------------<  204    4.5     |  20     |  1.13     Ca    9.9        11 Aug 2024 04:27  Phos  2.4       11 Aug 2024 04:27  Mg     1.8       11 Aug 2024 04:27    TPro  6.4    /  Alb  3.8    /  TBili  0.5    /  DBili  x      /  AST  31     /  ALT  18     /  AlkPhos  68     11 Aug 2024 04:27    PT/INR - ( 11 Aug 2024 08:35 )   PT: 11.1 sec;   INR: 1.01 ratio         PTT - ( 11 Aug 2024 08:35 )  PTT:24.0 sec  CAPILLARY BLOOD GLUCOSE            LIVER FUNCTIONS - ( 11 Aug 2024 04:27 )  Alb: 3.8 g/dL / Pro: 6.4 g/dL / ALK PHOS: 68 U/L / ALT: 18 U/L / AST: 31 U/L / GGT: x             Urinalysis Basic - ( 11 Aug 2024 04:27 )    Color: x / Appearance: x / SG: x / pH: x  Gluc: 204 mg/dL / Ketone: x  / Bili: x / Urobili: x   Blood: x / Protein: x / Nitrite: x   Leuk Esterase: x / RBC: x / WBC x   Sq Epi: x / Non Sq Epi: x / Bacteria: x      IMAGING:    CT Abdomen and Pelvis w/ IV Cont (08.11.24 @ 07:40)    FINDINGS:  LOWER CHEST: Bibasilar subsegmental atelectasis. Coronary artery   calcifications.    LIVER: Within normal limits.  BILE DUCTS: Normal caliber.  GALLBLADDER: Within normal limits.  SPLEEN:Within normal limits.  PANCREAS: Within normal limits.  ADRENALS: Within normal limits.  KIDNEYS/URETERS: No renal stones or hydronephrosis. Bilateral renal cysts.    BLADDER: Within normal limits.  REPRODUCTIVE ORGANS: Prostate is enlarged    BOWEL:Hiatal hernia. Areas of stranding and inflammatory changes with   mucosal thickening adjacent to the gastric antrum suspicious for a   possible perforated ulcer in the setting of pneumoperitoneum; there is   suggestion of very gastric wall defect on image 40 of series 301. Left   lower quadrant ostomy. No bowel obstruction. Appendix is normal.  PERITONEUM/RETROPERITONEUM: Numerous foci of air within the peritoneum   including a large pocket anteriorly as well as scattered foci throughout   the peritoneum suspicious for perforated viscus, likely to be arising   from the stomach. Moderate free fluid in the pelvis.  VESSELS: Atherosclerotic changes.  LYMPH NODES: No lymphadenopathy.  ABDOMINAL WALL: Within normal limits.  BONES: Degenerative changes. Sclerotic focus in the left iliac bone.   Grade 1 retrolisthesis of L1 on L2.    IMPRESSION:  Scattered pneumoperitoneum suspicious for perforated viscus, which is   likely originating from the stomach wall with suggestion of a well defect   along the posterior aspect of the antrum on image 40 of series 301.    Small amount of free fluid in the pelvis is noted; no evidence for   loculated fluid collections.    Left lower quadrant ostomy place.

## 2024-08-11 NOTE — ED PROVIDER NOTE - PROGRESS NOTE DETAILS
Loren Flynn PA-C: patient signed out by previous team. surgery paged. awaiting call back. Loren Flynn PA-C: spoke with surgery. states in a trauma at this time. states will call back. Loren Flynn PA-C: spoke with surgery. will come see patient. Lorne Flynn PA-C: spoke with surgery. request admission to their service. patient to go to OR. discussed with ED attending

## 2024-08-11 NOTE — ED PROVIDER NOTE - PHYSICAL EXAMINATION
Mckenzie Wilson DO (PGY1)   Physical Exam:    Gen: cachectic appearing, able to ambulate without assistance  Head: NCAT  HEENT: EOMI, PEERLA, normal conjunctiva, tongue midline, oral mucosa moist  Lung: CTAB, no respiratory distress, no wheezes/rhonchi/rales B/L  CV: RRR, no murmurs, rubs or gallops  Abd: Intact ostomy bag with no eytherma or drainage. soft abdomen with mild diffuse ttp

## 2024-08-11 NOTE — ED PROVIDER NOTE - ATTENDING CONTRIBUTION TO CARE
I have personally performed a face to face medical and diagnostic evaluation of the patient. I have discussed with and reviewed the Resident's note and agree with the History, ROS, Physical Exam and MDM unless otherwise indicated. A brief summary of my personal evaluation and impression can be found below.    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 On exam, Patient is afebrile and vitally stable with diffuse abd ttp.  Will obtain labs and CT abdomen pelvis to eval for obstruction vs other intraabdominal pathology.  Dispo pending results but likely admit at minimum for pain control. Bhakti Todd, ED Attending

## 2024-08-11 NOTE — BRIEF OPERATIVE NOTE - NSICDXBRIEFPOSTOP_GEN_ALL_CORE_FT
POST-OP DIAGNOSIS:  Ulcer of pyloric antrum, unspecified ulcer chronicity 11-Aug-2024 19:03:01  Braden Thompson

## 2024-08-11 NOTE — CONSULT NOTE ADULT - ATTENDING COMMENTS
79M w/ PMHx of rectal CA s/p APR in 2010  and recently diagnosed Right tonsill SCC on active XRT   Now diag with  pneumoperitoneum with pyloric ulcer s/p repair of perforated pyloric ulcer using Kash patch  S/P extubated not on pressure.    Pain control with small dose Dilaudid IV Tylenol.  Saturating well on RA  Not on pressure  NPO, TPN consult. Pt nutritionally depleted.   IVF  Rx of possible H pylori, stool for H pylori  BS controlled  Pharm DVT ppx

## 2024-08-12 LAB
ALBUMIN SERPL ELPH-MCNC: 3 G/DL — LOW (ref 3.3–5)
ALP SERPL-CCNC: 43 U/L — SIGNIFICANT CHANGE UP (ref 40–120)
ALT FLD-CCNC: 15 U/L — SIGNIFICANT CHANGE UP (ref 10–45)
ANION GAP SERPL CALC-SCNC: 12 MMOL/L — SIGNIFICANT CHANGE UP (ref 5–17)
APTT BLD: 29.1 SEC — SIGNIFICANT CHANGE UP (ref 24.5–35.6)
AST SERPL-CCNC: 26 U/L — SIGNIFICANT CHANGE UP (ref 10–40)
BILIRUB SERPL-MCNC: 0.6 MG/DL — SIGNIFICANT CHANGE UP (ref 0.2–1.2)
BUN SERPL-MCNC: 24 MG/DL — HIGH (ref 7–23)
CALCIUM SERPL-MCNC: 8.6 MG/DL — SIGNIFICANT CHANGE UP (ref 8.4–10.5)
CHLORIDE SERPL-SCNC: 107 MMOL/L — SIGNIFICANT CHANGE UP (ref 96–108)
CO2 SERPL-SCNC: 20 MMOL/L — LOW (ref 22–31)
CREAT SERPL-MCNC: 1 MG/DL — SIGNIFICANT CHANGE UP (ref 0.5–1.3)
EGFR: 78 ML/MIN/1.73M2 — SIGNIFICANT CHANGE UP
GAS PNL BLDA: SIGNIFICANT CHANGE UP
GLUCOSE BLDC GLUCOMTR-MCNC: 113 MG/DL — HIGH (ref 70–99)
GLUCOSE BLDC GLUCOMTR-MCNC: 99 MG/DL — SIGNIFICANT CHANGE UP (ref 70–99)
GLUCOSE SERPL-MCNC: 128 MG/DL — HIGH (ref 70–99)
GRAM STN FLD: ABNORMAL
HCT VFR BLD CALC: 28.5 % — LOW (ref 39–50)
HCT VFR BLD CALC: 29.2 % — LOW (ref 39–50)
HGB BLD-MCNC: 9.6 G/DL — LOW (ref 13–17)
HGB BLD-MCNC: 9.9 G/DL — LOW (ref 13–17)
INR BLD: 1.23 RATIO — HIGH (ref 0.85–1.18)
MAGNESIUM SERPL-MCNC: 1.8 MG/DL — SIGNIFICANT CHANGE UP (ref 1.6–2.6)
MCHC RBC-ENTMCNC: 30.6 PG — SIGNIFICANT CHANGE UP (ref 27–34)
MCHC RBC-ENTMCNC: 30.7 PG — SIGNIFICANT CHANGE UP (ref 27–34)
MCHC RBC-ENTMCNC: 33.7 GM/DL — SIGNIFICANT CHANGE UP (ref 32–36)
MCHC RBC-ENTMCNC: 33.9 GM/DL — SIGNIFICANT CHANGE UP (ref 32–36)
MCV RBC AUTO: 90.4 FL — SIGNIFICANT CHANGE UP (ref 80–100)
MCV RBC AUTO: 90.8 FL — SIGNIFICANT CHANGE UP (ref 80–100)
NRBC # BLD: 0 /100 WBCS — SIGNIFICANT CHANGE UP (ref 0–0)
NRBC # BLD: 0 /100 WBCS — SIGNIFICANT CHANGE UP (ref 0–0)
NT-PROBNP SERPL-SCNC: 594 PG/ML — HIGH (ref 0–300)
PHOSPHATE SERPL-MCNC: 3.7 MG/DL — SIGNIFICANT CHANGE UP (ref 2.5–4.5)
PLATELET # BLD AUTO: 120 K/UL — LOW (ref 150–400)
PLATELET # BLD AUTO: 144 K/UL — LOW (ref 150–400)
POTASSIUM SERPL-MCNC: 4.4 MMOL/L — SIGNIFICANT CHANGE UP (ref 3.5–5.3)
POTASSIUM SERPL-SCNC: 4.4 MMOL/L — SIGNIFICANT CHANGE UP (ref 3.5–5.3)
PROT SERPL-MCNC: 5 G/DL — LOW (ref 6–8.3)
PROTHROM AB SERPL-ACNC: 13.4 SEC — HIGH (ref 9.5–13)
RBC # BLD: 3.14 M/UL — LOW (ref 4.2–5.8)
RBC # BLD: 3.14 M/UL — LOW (ref 4.2–5.8)
RBC # BLD: 3.23 M/UL — LOW (ref 4.2–5.8)
RBC # FLD: 15.3 % — HIGH (ref 10.3–14.5)
RBC # FLD: 15.8 % — HIGH (ref 10.3–14.5)
RETICS #: 38.3 K/UL — SIGNIFICANT CHANGE UP (ref 25–125)
RETICS/RBC NFR: 1.2 % — SIGNIFICANT CHANGE UP (ref 0.5–2.5)
SODIUM SERPL-SCNC: 139 MMOL/L — SIGNIFICANT CHANGE UP (ref 135–145)
SPECIMEN SOURCE: SIGNIFICANT CHANGE UP
WBC # BLD: 5.28 K/UL — SIGNIFICANT CHANGE UP (ref 3.8–10.5)
WBC # BLD: 5.63 K/UL — SIGNIFICANT CHANGE UP (ref 3.8–10.5)
WBC # FLD AUTO: 5.28 K/UL — SIGNIFICANT CHANGE UP (ref 3.8–10.5)
WBC # FLD AUTO: 5.63 K/UL — SIGNIFICANT CHANGE UP (ref 3.8–10.5)

## 2024-08-12 PROCEDURE — 99291 CRITICAL CARE FIRST HOUR: CPT | Mod: FS

## 2024-08-12 PROCEDURE — 71045 X-RAY EXAM CHEST 1 VIEW: CPT | Mod: 26

## 2024-08-12 PROCEDURE — 99223 1ST HOSP IP/OBS HIGH 75: CPT

## 2024-08-12 RX ORDER — ELECTROLYTE SOLUTION,INJ
1 VIAL (ML) INTRAVENOUS
Refills: 0 | Status: DISCONTINUED | OUTPATIENT
Start: 2024-08-12 | End: 2024-08-13

## 2024-08-12 RX ORDER — ACETAMINOPHEN 325 MG/1
1000 TABLET ORAL EVERY 6 HOURS
Refills: 0 | Status: COMPLETED | OUTPATIENT
Start: 2024-08-12 | End: 2024-08-13

## 2024-08-12 RX ORDER — MENTHOL/CETYLPYRD CL 3 MG
1 LOZENGE MUCOUS MEMBRANE EVERY 6 HOURS
Refills: 0 | Status: DISCONTINUED | OUTPATIENT
Start: 2024-08-12 | End: 2024-08-19

## 2024-08-12 RX ADMIN — HYDROMORPHONE HYDROCHLORIDE 0.25 MILLIGRAM(S): 2 TABLET ORAL at 15:00

## 2024-08-12 RX ADMIN — Medication 100 MILLIGRAM(S): at 05:32

## 2024-08-12 RX ADMIN — HYDROMORPHONE HYDROCHLORIDE 0.25 MILLIGRAM(S): 2 TABLET ORAL at 21:29

## 2024-08-12 RX ADMIN — Medication 40 MILLIGRAM(S): at 05:31

## 2024-08-12 RX ADMIN — Medication 1 EACH: at 18:07

## 2024-08-12 RX ADMIN — PIPERACILLIN SODIUM AND TAZOBACTAM SODIUM 25 GRAM(S): 3; .375 INJECTION, POWDER, FOR SOLUTION INTRAVENOUS at 21:00

## 2024-08-12 RX ADMIN — ACETAMINOPHEN 1000 MILLIGRAM(S): 325 TABLET ORAL at 08:00

## 2024-08-12 RX ADMIN — ACETAMINOPHEN 400 MILLIGRAM(S): 325 TABLET ORAL at 13:10

## 2024-08-12 RX ADMIN — HYDROMORPHONE HYDROCHLORIDE 0.5 MILLIGRAM(S): 2 TABLET ORAL at 18:20

## 2024-08-12 RX ADMIN — ACETAMINOPHEN 400 MILLIGRAM(S): 325 TABLET ORAL at 07:29

## 2024-08-12 RX ADMIN — FLUCONAZOLE 100 MILLIGRAM(S): 150 TABLET ORAL at 21:00

## 2024-08-12 RX ADMIN — Medication 40 MILLIGRAM(S): at 18:07

## 2024-08-12 RX ADMIN — Medication 75 MILLILITER(S): at 07:28

## 2024-08-12 RX ADMIN — HYDROMORPHONE HYDROCHLORIDE 0.5 MILLIGRAM(S): 2 TABLET ORAL at 13:10

## 2024-08-12 RX ADMIN — Medication 25 GRAM(S): at 01:10

## 2024-08-12 RX ADMIN — HYDROMORPHONE HYDROCHLORIDE 0.25 MILLIGRAM(S): 2 TABLET ORAL at 11:00

## 2024-08-12 RX ADMIN — HYDROMORPHONE HYDROCHLORIDE 0.5 MILLIGRAM(S): 2 TABLET ORAL at 03:46

## 2024-08-12 RX ADMIN — HYDROMORPHONE HYDROCHLORIDE 0.5 MILLIGRAM(S): 2 TABLET ORAL at 13:30

## 2024-08-12 RX ADMIN — CHLORHEXIDINE GLUCONATE 1 APPLICATION(S): 40 SOLUTION TOPICAL at 05:31

## 2024-08-12 RX ADMIN — HYDROMORPHONE HYDROCHLORIDE 0.5 MILLIGRAM(S): 2 TABLET ORAL at 03:16

## 2024-08-12 RX ADMIN — HYDROMORPHONE HYDROCHLORIDE 0.25 MILLIGRAM(S): 2 TABLET ORAL at 14:30

## 2024-08-12 RX ADMIN — PIPERACILLIN SODIUM AND TAZOBACTAM SODIUM 3.38 GRAM(S): 3; .375 INJECTION, POWDER, FOR SOLUTION INTRAVENOUS at 05:10

## 2024-08-12 RX ADMIN — PIPERACILLIN SODIUM AND TAZOBACTAM SODIUM 25 GRAM(S): 3; .375 INJECTION, POWDER, FOR SOLUTION INTRAVENOUS at 05:32

## 2024-08-12 RX ADMIN — Medication 1 SPRAY(S): at 06:04

## 2024-08-12 RX ADMIN — HYDROMORPHONE HYDROCHLORIDE 0.25 MILLIGRAM(S): 2 TABLET ORAL at 10:37

## 2024-08-12 RX ADMIN — ACETAMINOPHEN 1000 MILLIGRAM(S): 325 TABLET ORAL at 21:30

## 2024-08-12 RX ADMIN — PIPERACILLIN SODIUM AND TAZOBACTAM SODIUM 25 GRAM(S): 3; .375 INJECTION, POWDER, FOR SOLUTION INTRAVENOUS at 13:11

## 2024-08-12 RX ADMIN — ACETAMINOPHEN 400 MILLIGRAM(S): 325 TABLET ORAL at 21:00

## 2024-08-12 RX ADMIN — PIPERACILLIN SODIUM AND TAZOBACTAM SODIUM 25 GRAM(S): 3; .375 INJECTION, POWDER, FOR SOLUTION INTRAVENOUS at 01:10

## 2024-08-12 RX ADMIN — HYDROMORPHONE HYDROCHLORIDE 0.25 MILLIGRAM(S): 2 TABLET ORAL at 20:59

## 2024-08-12 RX ADMIN — HYDROMORPHONE HYDROCHLORIDE 0.5 MILLIGRAM(S): 2 TABLET ORAL at 18:45

## 2024-08-12 RX ADMIN — ENOXAPARIN SODIUM 40 MILLIGRAM(S): 100 INJECTION SUBCUTANEOUS at 05:31

## 2024-08-12 RX ADMIN — ACETAMINOPHEN 1000 MILLIGRAM(S): 325 TABLET ORAL at 13:30

## 2024-08-12 NOTE — DIETITIAN INITIAL EVALUATION ADULT - OTHER INFO
Dosing wt (8/11): 151.4 lbs  [facility]  Dosing wt (8/11): 151.4 lbs  UBW: 170 lbs x 7 week ago. Wt loss noted in setting of decreased PO intake with initiation of chemotherapy. Net loss of 10.9% x 7 weeks, significant.

## 2024-08-12 NOTE — DIETITIAN INITIAL EVALUATION ADULT - PERTINENT MEDS FT
MEDICATIONS  (STANDING):  chlorhexidine 2% Cloths 1 Application(s) Topical <User Schedule>  enoxaparin Injectable 40 milliGRAM(s) SubCutaneous every 24 hours  fluconAZOLE IVPB 400 milliGRAM(s) IV Intermittent every 24 hours  fluconAZOLE IVPB      lactated ringers. 1000 milliLiter(s) (75 mL/Hr) IV Continuous <Continuous>  metroNIDAZOLE  IVPB 500 milliGRAM(s) IV Intermittent every 8 hours  pantoprazole  Injectable 40 milliGRAM(s) IV Push two times a day  piperacillin/tazobactam IVPB.. 3.375 Gram(s) IV Intermittent every 8 hours    MEDICATIONS  (PRN):  acetaminophen   IVPB .. 1000 milliGRAM(s) IV Intermittent every 8 hours PRN Mild Pain (1 - 3)  benzocaine 20% Spray 1 Spray(s) Topical every 6 hours PRN Sore throat  HYDROmorphone  Injectable 0.5 milliGRAM(s) IV Push every 3 hours PRN Severe Pain (7 - 10)  HYDROmorphone  Injectable 0.25 milliGRAM(s) IV Push every 3 hours PRN Moderate Pain (4 - 6)

## 2024-08-12 NOTE — PHYSICAL THERAPY INITIAL EVALUATION ADULT - ADDITIONAL COMMENTS
Pt lives with wife in split level home, stairs to negotiate (no stairs to enter, +4 steps and +5 steps within home to kitchen/bedroom respectively), walk in shower. +independent with ambulation and ADLs, no DME, +drives, spouse amb with rollator walker for longer distances.

## 2024-08-12 NOTE — DIETITIAN INITIAL EVALUATION ADULT - ADD RECOMMEND
1. Diet advancement deferred to medical team. TPN as per TPN team, nutrition assessment.   2. Defer initiation of micronutrient supplementation to medical team as pt had been receiving chemotherapy prior to admission.   3. Malnutrition sticker placed.

## 2024-08-12 NOTE — DIETITIAN INITIAL EVALUATION ADULT - ALTERNATE MEANS OF NUTRITION
PARENTERAL NUTRITION IF WARRANTED:   [x] CPN (central PN)  [] PPN (peripheral PN; max 900 mOsm/L)  [] Premixed/Multi Chamber Bags     GOAL PN: 135g amino acids, 210g dextrose, 70g SMOF lipid; to provide 1954kcal/day (28.4kcal/kg and 1.96 g/kg protein based on dosing wt 68.8kg)    Non-Protein Calories: 1414 kcal/day (20.6 kcal/kg, based on dosing wt 68.8 kg)  Dextrose Infusion Rate: 2.1mg/kg/min (24-hour infusion)  Lipid Infusion Rate: 1.0 g/kg; 0.08g/kg/hr (12-hour infusion)

## 2024-08-12 NOTE — PROGRESS NOTE ADULT - ASSESSMENT
Mr. Fulton is a 79 year old man with a history of locally advanced rectal adenocarcinoma status post neoadjuvant chemoradiotherapy and abdominoperineal resection (Dr. 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    Plan:  - TPN consult pending  -PPI BID for ulcer ppx  - H pylori stool antigen pending  -NPO for 5 days (8/16) --> upper G series to r/o leak  - Appreciate SICU care

## 2024-08-12 NOTE — PATIENT PROFILE ADULT - FALL HARM RISK - HARM RISK INTERVENTIONS
Assistance with ambulation/Assistance OOB with selected safe patient handling equipment/Communicate Risk of Fall with Harm to all staff/Discuss with provider need for PT consult/Monitor gait and stability/Provide patient with walking aids - walker, cane, crutches/Reinforce activity limits and safety measures with patient and family/Sit up slowly, dangle for a short time, stand at bedside before walking/Tailored Fall Risk Interventions/Use of alarms - bed, chair and/or voice tab/Visual Cue: Yellow wristband and red socks/Bed in lowest position, wheels locked, appropriate side rails in place/Call bell, personal items and telephone in reach/Instruct patient to call for assistance before getting out of bed or chair/Non-slip footwear when patient is out of bed/[place] to call system/Physically safe environment - no spills, clutter or unnecessary equipment/Purposeful Proactive Rounding/Room/bathroom lighting operational, light cord in reach

## 2024-08-12 NOTE — PATIENT PROFILE ADULT - FUNCTIONAL ASSESSMENT - BASIC MOBILITY 4.
Kim Martinez is a 86 year old female here for  Chief Complaint   Patient presents with   • Follow-up     Ongoing care of MDS/anemia     Denies latex allergy or sensitivity.    Medication verified and med list updated.  PCP and Pharmacy verified.    Social History     Tobacco Use   Smoking Status Former Smoker   • Packs/day: 0.80   • Years: 25.00   • Pack years: 20.00   • Types: Cigarettes   • Last attempt to quit: 8/3/1986   • Years since quittin.5   Smokeless Tobacco Never Used     Advance Directives Filed: Yes    ECO - No physically strenuous activity, but ambulatory and able to carry out light or sedentary work.    Height: No.  Ht Readings from Last 1 Encounters:   20 5' 1\" (1.549 m)     Weight:Yes, shoes on.  Wt Readings from Last 3 Encounters:   20 70.3 kg   20 69 kg   20 70.4 kg       BMI: Body mass index is 29.29 kg/m².    REVIEW OF SYSTEMS Neuropathy pain in legs. Shooting pains at night. Vomits occasionally, due to esophagus.   Muscles in calves are hard and painful. Skin is breaking open on legs. Knee is painful.   GENERAL:  Patient denies headache, fevers, chills, night sweats, excessive fatigue, change in appetite, weight loss, dizziness, has night sweats which are not new. fatigue level is the same.  ALLERGIC/IMMUNOLOGIC: Verified allergies: Yes  EYES:  Patient denies significant visual difficulties, double vision, blurred vision  ENT/MOUTH: Patient denies problems with hearing, sore throat, sinus drainage, mouth sores  ENDOCRINE:  Patient denies diabetes, thyroid disease, hormone replacement, hot flashes  HEMATOLOGIC/LYMPHATIC: Patient denies easy bruising, bleeding, tender lymph nodes, swollen lymph nodes  BREASTS: Patient denies abnormal masses of breast, nipple discharge, pain  RESPIRATORY:  Patient denies lung pain with breathing, cough, coughing up blood, shortness of breath, chronic cough  CARDIOVASCULAR:  Patient denies anginal chest pain, palpitations,  shortness of breath when lying flat, peripheral edema, chest heaviness when blood level is low  GASTROINTESTINAL: Patient denies abdominal pain , nausea, vomiting, diarrhea, GI bleeding, constipation, change in bowel habits, heartburn, sensation of feeling full, difficulty swallowing, has some occasional vomiting.   : Patient denies abnormal genital masses, blood in the urine, frequency, urgency, burning with urination, hesitancy, incontinence, vaginal bleeding, discharge  MUSCULOSKELETAL:  Patient denies joint pain, bone pain, joint swelling, redness, decreased range of motion, has aches and pains.   SKIN:  Patient denies chronic rashes, inflammation, ulcerations, skin changes, itching  NEUROLOGIC:  Patient denies loss of balance, areas of focal weakness, abnormal gait, sensory problems, numbness, tingling, has some neuropathy. has had injections . Might be a little worse.   PSYCHIATRIC: Patient denies insomnia, depression, anxiety   3 = A little assistance

## 2024-08-12 NOTE — PHYSICAL THERAPY INITIAL EVALUATION ADULT - PLANNED THERAPY INTERVENTIONS, PT EVAL
stair neg: GOAL: pt will neg 5 steps 1 HR ind in 4wks./bed mobility training/gait training/transfer training

## 2024-08-12 NOTE — DIETITIAN INITIAL EVALUATION ADULT - REASON FOR ADMISSION
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 Now admitted for exploratory laparotomy and Kash patch for hollow viscus injury. Intra-operatively, noted to have a pinhole perforation of the anterior pylorus with spillage of bile, treated with Kash patch using falciform ligament. Prevena wound vac in place. Brought to SICU for hemodynamic monitoring.

## 2024-08-12 NOTE — CONSULT NOTE ADULT - ASSESSMENT
Perforated pyloric ulcer   s/p repair   fu with surgery     HTN  resume meds once deemed safe     sinus bradycardia   asymptomatic  cont to monitor

## 2024-08-12 NOTE — DIETITIAN INITIAL EVALUATION ADULT - NSFNSGIIOFT_GEN_A_CORE
-Receiving Lactated Ringers, infusing at 75ml/hr for hydration.   -Colostomy in place. No output documented thus far.  -NGT for suction noted. Output: (8/12): 100ml (thus far).   -Drain (BELL 1 and BELL 2) noted. See nursing flow sheet for output documentation.

## 2024-08-12 NOTE — OCCUPATIONAL THERAPY INITIAL EVALUATION ADULT - LIVES WITH, PROFILE
Pt lives with wife in split level home, stairs to negotiate, walk in shower. Pt I in ADLs and ambulation prior to admission

## 2024-08-12 NOTE — DIETITIAN INITIAL EVALUATION ADULT - SIGNS/SYMPTOMS
Energy intake average <75% x >/=1 mo, wt loss 10.9% x 7 weeks, moderate body fat/muscle depletion Pt diagnosed with stage II tonsillar squamous cell carcinoma, s/p ex-lap for hollow viscous injury

## 2024-08-12 NOTE — PHYSICAL THERAPY INITIAL EVALUATION ADULT - PERTINENT HX OF CURRENT PROBLEM, REHAB EVAL
Pt is a 79M admitted to [facility] on 8/11/24 w/ PMHx of rectal CA s/p APR in 2010 by Dr. 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 Pt denies nausea or vomiting. 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 No fevers/chills, chest pain/shortness of breath, or dizziness/lightheadedness. Hospital course: In the ED, pt was found to be afebrile, hemodynamically stable, labs were largely unremarkable, CTAP with IV contrast revealed pneumoperitoneum with concerns for hollow viscus. Repeat CTAP with PO contrast revealed slight oral contrast extravasation, consistent with hollow viscus. CT abd/pelvis: Considerable amount of pneumoperitoneum in the upper abdomen and extraluminal oral contrast extravasation from the first portion of the duodenum likely secondary to a perforated peptic ulcer. Inflammatory changes involving the duodenal bulb and gastric antrum suggesting peptic disease. No bowel obstruction. Left colostomy.

## 2024-08-12 NOTE — PROGRESS NOTE ADULT - ASSESSMENT
Mr. Fulton is a 79 year old man with a history of locally advanced rectal adenocarcinoma status post neoadjuvant chemoradiotherapy and abdominoperineal resection (Dr. 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 Intra-operatively he was noted to have a pinhole perforation of the anterior pylorus with spillage of bile, treated with a Kash patch using the falciform ligament, left with two drains (#1 in the lesser sac due to presence of bilious succus prior to washout, #2 overlying the repair), skin closed in interrupted fashion with a Prevena wound vac. He is now brought to the surgical intensive care unit post-operatively for close hemodynamic monitoring.     PLAN:    Neuro:  - Multimodal pain control w/ Tylenol and Dilaudid     Resp:  - Out of bed to chair, ambulate as tolerated, and incentive spirometry to prevent atelectasis  - 2L NC   - Benzocaine oral spray for throat     CV:  - MAP > 65 mmHg    GI: # pyloric ulcer  - NPO with NGT to low suction for bowel rest x 5 days   - TPN consult and PICC placed   - Protonix BID for H.Pylori ppx pending stool antigen test    Renal:  - Monitor I&Os and electrolytes w/ repletions as necessary  - Sánchez   - LR@75    Heme:  - Monitor CBC and coags  - Lovenox for VTE prophylaxis    ID: # ppx treat H. Pylori due to pyloric ulcer perforation   - Monitor for clinical evidence of active infection  - Monitor WBC, temperature, and procalcitonin  - Flagyl, ZOsyn, Vancomycin, Fluconazole       Endo:   - MAREN     Code Status: Full code     Lines  - L Radial A-line     SICU   [id number]

## 2024-08-12 NOTE — CONSULT NOTE ADULT - ASSESSMENT
79 year old man with a history of locally advanced rectal adenocarcinoma status post neoadjuvant chemoradiotherapy and abdominoperineal resection (Dr. 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   Patient planned for NPO x 5 days followed by UGI series to check for leak prior to considering enteral diet.  TPN team consulted given anticipated prolonged NPO status.     GOAL PN: 135g amino acids, 210g dextrose, 70g SMOF lipid; to provide 1954kcal/day (28.4kcal/kg and 1.96 g/kg protein based on dosing wt 68.8kg)    - Nutritional Assessment, patient not meeting nutritional goals enterally and would benefit from TPN for nutritional support given severe protein calorie malnutrition  - TPN plan:  start on TPN today as patient continues to be NPO;  will start at AA 60 G, Dex 140 G, NaCl 40 mEq, Na Acetate 60 mEq, NaPhos 45 mmol, KCl 60 mEq, CaGlu 10 mEq, Mg 12 mEq, TE 1 mL, MVI 10 mL at volume of 2000 mL   - Recommend checking baseline nutrition parameters:  Prealbumin, Lipids, HgA1c, iCal, Mag, Phos  - Abnormal TSH:  repeat TSH and if abnormal - recommend full TFTs  - TPN access:  Patient will need a dedicated double lumen PICC today for TPN to start; please obtain CXR after placement and order "okay to access central line" once placement confirmed.  Please order chlorhexidine cloth cleanses to maintain PICC.  - Electrolyte Imbalance risk- check CMP, Mg, Phos, iCa daily, will adjust in TPN as needed  - Anemia:  Check Iron studies, B12, folate  - Risk of glucose dysfunction with TPN:  check HgA1c; would need to initiate finger sticks every 6 hours to monitor glucose trend once TPN starts  - Risk for Hypertriglyceridemia with TPN:  plan to check baseline lipid profile in AM and monitor TG closely once/if TPN starts  - Risk of refeeding syndrome:  add Thiamine 100 mg daily x 5 days; monitor potassium, magnesium, phosphorus, glucose and fluid balance closely  - Continue strict Intake and Output; check weights three times a week; check BNP in AM  - Primary team to STOP/adjust IV fluids once TPN starts tonight  - Global care per primary team     Available on TEAMS  TPN spectra [id number] ([phone] when dialing from outside line)  M-F 8A-2P, Weekends and holidays 8/9A-12/1P  Discussed with Dr. Mark Buckley  79 year old man with a history of locally advanced rectal adenocarcinoma status post neoadjuvant chemoradiotherapy and abdominoperineal resection (Dr. 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   Patient planned for NPO x 5 days followed by UGI series to check for leak prior to considering enteral diet.  TPN team consulted given anticipated prolonged NPO status.     GOAL PN: 135g amino acids, 210g dextrose, 70g SMOF lipid; to provide 1954kcal/day (28.4kcal/kg and 1.96 g/kg protein based on dosing wt 68.8kg)    - Nutritional Assessment, patient not meeting nutritional goals enterally and would benefit from TPN for nutritional support given severe protein calorie malnutrition  - TPN plan:  start on TPN today as patient continues to be NPO;  will start at AA 60 G, Dex 140 G, NaCl 40 mEq, Na Acetate 60 mEq, NaPhos 45 mmol, KCl 60 mEq, CaGlu 10 mEq, Mg 12 mEq, TE 1 mL, MVI 10 mL at volume of 2000 mL   - Recommend checking baseline nutrition parameters:  Prealbumin, Lipids, HgA1c, iCal, Mag, Phos  - Abnormal TSH:  repeat TSH and if abnormal - recommend full TFTs  - TPN access:  Patient will need a dedicated double lumen PICC today for TPN to start; please obtain CXR after placement and order "okay to access central line" once placement confirmed.  Please order chlorhexidine cloth cleanses to maintain PICC.  - Electrolyte Imbalance risk- check CMP, Mg, Phos, iCa daily, will adjust in TPN as needed  - Anemia:  Check Iron studies, B12, folate  - Risk of glucose dysfunction with TPN:  check HgA1c; would need to initiate finger sticks every 6 hours to monitor glucose trend once TPN starts  - Risk for Hypertriglyceridemia with TPN:  plan to check baseline lipid profile in AM and monitor TG closely once/if TPN starts  - Risk of refeeding syndrome:  add Thiamine 100 mg daily x 5 days; monitor potassium, magnesium, phosphorus, glucose and fluid balance closely  - Continue strict Intake and Output; check weights three times a week; check BNP in AM  - Primary team to STOP/adjust IV fluids once TPN starts tonight  - Global care per primary team       Available on TEAMS  TPN spectra [id number] ([phone] when dialing from outside line)  M-F 8A-2P, Weekends and holidays 8/9A-12/1P  Discussed with Dr. Mark Buckley

## 2024-08-12 NOTE — PHYSICAL THERAPY INITIAL EVALUATION ADULT - GENERAL OBSERVATIONS, REHAB EVAL
Pt a/w abdominal pain, hx rectal Ca and recent stage II tonsillar ca (radiation tx); + pneumoperitoneum with pyloric ulcer s/p exlap & repair of perforated pyloric ulcer using Kash patch. Pt received sitting in bedside chair, +ICU monitoring, +NGT, +BELL x2, +wound vac, +Sánchez, ostomy, H&H stable. Cleared by ASHISH Muñoz for PT eval.

## 2024-08-12 NOTE — OCCUPATIONAL THERAPY INITIAL EVALUATION ADULT - PERTINENT HX OF CURRENT PROBLEM, REHAB EVAL
Pt is a 79M admitted to [facility] on 8/11/24 w/ PMHx of rectal CA s/p APR in 2010 by Dr. 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 Pt denies nausea or vomiting. 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 No fevers/chills, chest pain/shortness of breath, or dizziness/lightheadedness. Hospital course: In the ED, pt was found to be afebrile, hemodynamically stable, labs were largely unremarkable, CTAP with IV contrast revealed pneumoperitoneum with concerns for hollow viscus. Repeat CTAP with PO contrast revealed slight oral contrast extravasation, consistent with hollow viscus. CT abd/pelvis: Considerable amount of pneumoperitoneum in the upper abdomen and extraluminal oral contrast extravasation from the first portion of the duodenum likely secondary to a perforated peptic ulcer. Inflammatory changes involving the duodenal bulb and gastric antrum suggesting peptic disease. No bowel obstruction. Left colostomy. Pt s/p exlap 8/11

## 2024-08-12 NOTE — CONSULT NOTE ADULT - SUBJECTIVE AND OBJECTIVE BOX
NUTRITION SUPPORT / TPN CONSULT NOTE    HPI:    Mr. Fulton is a 79 year old man with a history of locally advanced rectal adenocarcinoma status post neoadjuvant chemoradiotherapy and abdominoperineal resection (Dr. 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   Patient planned for NPO x 5 days followed by UGI series to check for leak prior to considering enteral diet.  TPN team consulted given anticipated prolonged NPO status.     24 hour/overnight events:  Patient seen and examined at bedside, chart reviewed and events noted and I's and O's reviewed; spouse/daughter at bedside.  Patient denies chest pain, shortness of breath, nausea or vomiting, dizziness, chills at time of visit.  Patient's VSS and no other acute overnight events noted.   Patient NPO with NGT decompression.  Patient states he has lost about 27 pounds over 7 weeks since RT initiation due to change in diet to FLD and increased phlegm.  Patient denies allergies to any foods, history of CHF/diuretics use      ROS:  Except as noted above, all other systems reviewed and are negative       MEDICATIONS  (STANDING):  acetaminophen   IVPB .. 1000 milliGRAM(s) IV Intermittent every 6 hours  chlorhexidine 2% Cloths 1 Application(s) Topical <User Schedule>  enoxaparin Injectable 40 milliGRAM(s) SubCutaneous every 24 hours  fluconAZOLE IVPB 400 milliGRAM(s) IV Intermittent every 24 hours  fluconAZOLE IVPB      insulin lispro (ADMELOG) corrective regimen sliding scale   SubCutaneous every 6 hours  lactated ringers. 1000 milliLiter(s) (75 mL/Hr) IV Continuous <Continuous>  pantoprazole  Injectable 40 milliGRAM(s) IV Push two times a day  Parenteral Nutrition - Adult 1 Each (83 mL/Hr) TPN Continuous <Continuous>  piperacillin/tazobactam IVPB.. 3.375 Gram(s) IV Intermittent every 8 hours    MEDICATIONS  (PRN):  benzocaine 20% Spray 1 Spray(s) Topical every 6 hours PRN Sore throat  HYDROmorphone  Injectable 0.5 milliGRAM(s) IV Push every 3 hours PRN Severe Pain (7 - 10)  HYDROmorphone  Injectable 0.25 milliGRAM(s) IV Push every 3 hours PRN Moderate Pain (4 - 6)      PAST MEDICAL & SURGICAL HISTORY:  Rectal Neoplasm  treated with oral chemothearpy and radiation 5/10- 6/10        Hemorrhoid      Malignant neoplasm of oropharynx      Colostomy status      Neck mass      S/P Colonoscopy  3/10      Rectal cancer      S/P colostomy      History of cancer chemotherapy      S/P radiation therapy          FAMILY HISTORY:  FH: lung cancer (Father)        ALLERGIES  No Known Allergies      REVIEW OF SYSTEMS  --------------------------------------------------------------------------------    Skin: No rashes  Head/Eyes/Ears/Mouth: No headache; Normal hearing; Normal vision w/o blurriness; No sinus pain/discomfort, sore throat  Respiratory: No dyspnea, cough, wheezing, hemoptysis  CV: No chest pain, PND, orthopnea  GI: +abdominal pain; No diarrhea, constipation, nausea, vomiting, melena, hematochezia  : No increased frequency, dysuria, hematuria, nocturia  MSK: No joint pain/swelling; no back pain; no edema  Neuro: No dizziness/lightheadedness, weakness, seizures, numbness, tingling  Heme: No easy bruising or bleeding  Endo: No heat/cold intolerance  Psych: No significant nervousness, anxiety, stress, depression      VITALS  T(C): 36.6 (08-12-24 @ 11:00), Max: 36.9 (08-11-24 @ 18:45)  HR: 60 (08-12-24 @ 13:00) (49 - 78)  BP: 180/81 (08-12-24 @ 13:00) (117/61 - 180/81)  RR: 27 (08-12-24 @ 13:00) (12 - 27)  SpO2: 96% (08-12-24 @ 13:00) (93% - 98%)  I&O's Detail    11 Aug 2024 07:01  -  12 Aug 2024 07:00  --------------------------------------------------------  IN:    Enteral Tube Flush: 80 mL    IV PiggyBack: 450 mL    IV PiggyBack: 300 mL    Lactated Ringers: 900 mL  Total IN: 1730 mL    OUT:    Bulb (mL): 30 mL    Bulb (mL): 10 mL    Colostomy (mL): 0 mL    Indwelling Catheter - Urethral (mL): 625 mL    Nasogastric/Oral tube (mL): 100 mL  Total OUT: 765 mL    Total NET: 965 mL      12 Aug 2024 07:01  -  12 Aug 2024 13:49  --------------------------------------------------------  IN:    IV PiggyBack: 100 mL    IV PiggyBack: 100 mL    Lactated Ringers: 375 mL  Total IN: 575 mL    OUT:    Bulb (mL): 60 mL    Indwelling Catheter - Urethral (mL): 265 mL  Total OUT: 325 mL    Total NET: 250 mL            LABS:                        9.9    5.63  )-----------( 144      ( 12 Aug 2024 03:27 )             29.2     08-12    139  |  107  |  24<H>  ----------------------------<  128<H>  4.4   |  20<L>  |  1.00    Ca    8.6      12 Aug 2024 03:27  Phos  3.7     08-12  Mg     1.8     08-12    TPro  5.0<L>  /  Alb  3.0<L>  /  TBili  0.6  /  DBili  x   /  AST  26  /  ALT  15  /  AlkPhos  43  08-12    Ionized Calcium Levels:     CAPILLARY BLOOD GLUCOSE      POCT Blood Glucose.: 113 mg/dL (12 Aug 2024 11:25)  CAPILLARY BLOOD GLUCOSE      POCT Blood Glucose.: 113 mg/dL (12 Aug 2024 11:25)    PT/INR - ( 12 Aug 2024 03:27 )   PT: 13.4 sec;   INR: 1.23 ratio         PTT - ( 12 Aug 2024 03:27 )  PTT:29.1 sec  ABG - ( 12 Aug 2024 03:15 )  pH, Arterial: 7.40  pH, Blood: x     /  pCO2: 35    /  pO2: 98    / HCO3: 22    / Base Excess: -2.6  /  SaO2: 99.5                  PHYSICAL EXAM  --------------------------------------------------------------------------------    Physical Exam:  Gen: NAD, frail appearing  HEENT: PERRL, supple neck, no JVD; +NGT; Neck discoloration/darkening  Chest: non labored breathing, equal chest expansion bilaterally; CTA b/l  CV: RRR, S1S2  Abd:  Soft, nontender, prevena vac in place, 2 left BELL drains SS, appropriate incisional tenderness, dressings c/d/i  : No suprapubic tenderness  Ext: Warm, FROM, no edema b/l LE  Neuro: No focal deficits  Psych: Normal affect and mood  Skin: Warm, without rashes

## 2024-08-12 NOTE — CONSULT NOTE ADULT - REASON FOR ADMISSION
Gastric perforation.
Gastric vs Duodenal perforation

## 2024-08-12 NOTE — DIETITIAN INITIAL EVALUATION ADULT - PERTINENT LABORATORY DATA
08-12    139  |  107  |  24<H>  ----------------------------<  128<H>  4.4   |  20<L>  |  1.00    Ca    8.6      12 Aug 2024 03:27  Phos  3.7     08-12  Mg     1.8     08-12    TPro  5.0<L>  /  Alb  3.0<L>  /  TBili  0.6  /  DBili  x   /  AST  26  /  ALT  15  /  AlkPhos  43  08-12

## 2024-08-12 NOTE — CONSULT NOTE ADULT - CONSULT REASON
Cardiac eval
Post-operative management of gastric perforation.
airway eval
Prolonged NPO anticipated

## 2024-08-12 NOTE — PROGRESS NOTE ADULT - SUBJECTIVE AND OBJECTIVE BOX
24 HOUR EVENTS:        SUBJECTIVE/ROS:  [ ] A ten-point review of systems was otherwise negative except as noted.  [ ] Due to altered mental status/intubation, subjective information were not able to be obtained from the patient. History was obtained, to the extent possible, from review of the chart and collateral sources of information.      NEURO  RASS:     GCS:     CAM ICU:  Exam: awake, alert, oriented  Meds: acetaminophen   IVPB .. 1000 milliGRAM(s) IV Intermittent every 8 hours PRN Mild Pain (1 - 3)  HYDROmorphone  Injectable 0.5 milliGRAM(s) IV Push every 3 hours PRN Severe Pain (7 - 10)  HYDROmorphone  Injectable 0.25 milliGRAM(s) IV Push every 3 hours PRN Moderate Pain (4 - 6)    [x] Adequacy of sedation and pain control has been assessed and adjusted      RESPIRATORY  RR: 15 (08-12-24 @ 03:00) (12 - 24)  SpO2: 95% (08-12-24 @ 03:00) (94% - 100%)  Wt(kg): --  Exam: unlabored, clear to auscultation bilaterally  Mechanical Ventilation:   ABG - ( 12 Aug 2024 03:15 )  pH: 7.40  /  pCO2: 35    /  pO2: 98    / HCO3: 22    / Base Excess: -2.6  /  SaO2: 99.5    Lactate: x                [N/A] Extubation Readiness Assessed  Meds:       CARDIOVASCULAR  HR: 52 (08-12-24 @ 03:00) (50 - 78)  BP: 129/78 (08-11-24 @ 13:40) (114/61 - 135/74)  BP(mean): 91 (08-11-24 @ 13:40) (76 - 93)  ABP: 115/49 (08-12-24 @ 03:00) (109/52 - 146/66)  ABP(mean): 72 (08-12-24 @ 03:00) (70 - 97)  Wt(kg): --  CVP(cm H2O): --  VBG - ( 11 Aug 2024 06:22 )  pH: 7.36  /  pCO2: 39    /  pO2: 64    / HCO3: 22    / Base Excess: -3.2  /  SaO2: 92.7   Lactate: 1.4                Exam: regular rate and rhythm  Cardiac Rhythm: sinus  Perfusion     [x]Adequate   [ ]Inadequate  Mentation   [x]Normal       [ ]Reduced  Extremities  [x]Warm         [ ]Cool  Volume Status [ ]Hypervolemic [x]Euvolemic [ ]Hypovolemic  Meds:       GI/NUTRITION  Exam: soft, nontender, nondistended, incision C/D/I  Diet:  Meds: pantoprazole  Injectable 40 milliGRAM(s) IV Push two times a day      GENITOURINARY  I&O's Detail    08-11 @ 07:01  -  08-12 @ 03:51  --------------------------------------------------------  IN:    IV PiggyBack: 350 mL    IV PiggyBack: 275 mL    Lactated Ringers: 600 mL  Total IN: 1225 mL    OUT:    Indwelling Catheter - Urethral (mL): 495 mL  Total OUT: 495 mL    Total NET: 730 mL        Weight (kg): 68.8 (08-11 @ 18:45)  08-11    137  |  106  |  27<H>  ----------------------------<  142<H>  4.4   |  19<L>  |  0.92    Ca    8.4      11 Aug 2024 20:36  Phos  3.0     08-11  Mg     1.8     08-11    TPro  5.5<L>  /  Alb  3.4  /  TBili  0.5  /  DBili  x   /  AST  30  /  ALT  18  /  AlkPhos  48  08-11    [ ] Sánchez catheter, indication: N/A  Meds: lactated ringers. 1000 milliLiter(s) IV Continuous <Continuous>        HEMATOLOGIC  Meds: enoxaparin Injectable 40 milliGRAM(s) SubCutaneous every 24 hours    [x] VTE Prophylaxis                        13.4   9.46  )-----------( 239      ( 11 Aug 2024 04:27 )             40.7     PT/INR - ( 12 Aug 2024 03:27 )   PT: 13.4 sec;   INR: 1.23 ratio         PTT - ( 12 Aug 2024 03:27 )  PTT:29.1 sec  Transfusion     [ ] PRBC   [ ] Platelets   [ ] FFP   [ ] Cryoprecipitate      INFECTIOUS DISEASES  WBC Count: 9.46 K/uL (08-11 @ 04:27)    RECENT CULTURES:    Meds: fluconAZOLE IVPB      fluconAZOLE IVPB 400 milliGRAM(s) IV Intermittent every 24 hours  metroNIDAZOLE  IVPB 500 milliGRAM(s) IV Intermittent every 8 hours  piperacillin/tazobactam IVPB.. 3.375 Gram(s) IV Intermittent every 8 hours        ENDOCRINE  CAPILLARY BLOOD GLUCOSE        Meds:       ACCESS DEVICES:  [ ] Peripheral IV  [ ] Central Venous Line	[ ] R	[ ] L	[ ] IJ	[ ] Fem	[ ] SC	Placed:   [ ] Arterial Line		[ ] R	[ ] L	[ ] Fem	[ ] Rad	[ ] Ax	Placed:   [ ] PICC:					[ ] Mediport  [ ] Urinary Catheter, Date Placed:   [x] Necessity of urinary, arterial, and venous catheters discussed    OTHER MEDICATIONS:  benzocaine 20% Spray 1 Spray(s) Topical every 6 hours PRN  chlorhexidine 2% Cloths 1 Application(s) Topical <User Schedule>      CODE STATUS:      IMAGING: 24 HOUR EVENTS:      SUBJECTIVE/ROS:  [X ] A ten-point review of systems was otherwise negative except as noted.  [ ] Due to altered mental status/intubation, subjective information were not able to be obtained from the patient. History was obtained, to the extent possible, from review of the chart and collateral sources of information.      NEURO  Exam: awake, alert, oriented  Meds: acetaminophen   IVPB .. 1000 milliGRAM(s) IV Intermittent every 8 hours PRN Mild Pain (1 - 3)  HYDROmorphone  Injectable 0.5 milliGRAM(s) IV Push every 3 hours PRN Severe Pain (7 - 10)  HYDROmorphone  Injectable 0.25 milliGRAM(s) IV Push every 3 hours PRN Moderate Pain (4 - 6)    [x] Adequacy of sedation and pain control has been assessed and adjusted      RESPIRATORY  RR: 15 (08-12-24 @ 03:00) (12 - 24)  SpO2: 95% (08-12-24 @ 03:00) (94% - 100%)  Exam: unlabored, clear to auscultation bilaterally  Mechanical Ventilation:   ABG - ( 12 Aug 2024 03:15 )  pH: 7.40  /  pCO2: 35    /  pO2: 98    / HCO3: 22    / Base Excess: -2.6  /  SaO2: 99.5    Lactate: x          [N/A] Extubation Readiness Assessed  Meds:       CARDIOVASCULAR  HR: 52 (08-12-24 @ 03:00) (50 - 78)  BP: 129/78 (08-11-24 @ 13:40) (114/61 - 135/74)  BP(mean): 91 (08-11-24 @ 13:40) (76 - 93)  ABP: 115/49 (08-12-24 @ 03:00) (109/52 - 146/66)  ABP(mean): 72 (08-12-24 @ 03:00) (70 - 97)  VBG - ( 11 Aug 2024 06:22 )  pH: 7.36  /  pCO2: 39    /  pO2: 64    / HCO3: 22    / Base Excess: -3.2  /  SaO2: 92.7   Lactate: 1.4      Exam: regular rate and rhythm  Cardiac Rhythm: sinus  Perfusion     [x]Adequate   [ ]Inadequate  Mentation   [x]Normal       [ ]Reduced  Extremities  [x]Warm         [ ]Cool  Volume Status [ ]Hypervolemic [x]Euvolemic [ ]Hypovolemic  Meds:       GI/NUTRITION  Exam: soft, nontender, nondistended, Prevena to suction   Diet: NPO with NGT to suction   Meds: pantoprazole  Injectable 40 milliGRAM(s) IV Push two times a day      GENITOURINARY  I&O's Detail    08-11 @ 07:01  -  08-12 @ 03:51  --------------------------------------------------------  IN:    IV PiggyBack: 350 mL    IV PiggyBack: 275 mL    Lactated Ringers: 600 mL  Total IN: 1225 mL    OUT:    Indwelling Catheter - Urethral (mL): 495 mL  Total OUT: 495 mL    Total NET: 730 mL        Weight (kg): 68.8 (08-11 @ 18:45)  08-11    137  |  106  |  27<H>  ----------------------------<  142<H>  4.4   |  19<L>  |  0.92    Ca    8.4      11 Aug 2024 20:36  Phos  3.0     08-11  Mg     1.8     08-11    TPro  5.5<L>  /  Alb  3.4  /  TBili  0.5  /  DBili  x   /  AST  30  /  ALT  18  /  AlkPhos  48  08-11    [ ] Sánchez catheter, indication: N/A  Meds: lactated ringers. 1000 milliLiter(s) IV Continuous <Continuous>        HEMATOLOGIC  Meds: enoxaparin Injectable 40 milliGRAM(s) SubCutaneous every 24 hours    [x] VTE Prophylaxis                        13.4   9.46  )-----------( 239      ( 11 Aug 2024 04:27 )             40.7     PT/INR - ( 12 Aug 2024 03:27 )   PT: 13.4 sec;   INR: 1.23 ratio         PTT - ( 12 Aug 2024 03:27 )  PTT:29.1 sec  Transfusion     [ ] PRBC   [ ] Platelets   [ ] FFP   [ ] Cryoprecipitate      INFECTIOUS DISEASES  WBC Count: 9.46 K/uL (08-11 @ 04:27)    RECENT CULTURES:    Meds: fluconAZOLE IVPB      fluconAZOLE IVPB 400 milliGRAM(s) IV Intermittent every 24 hours  metroNIDAZOLE  IVPB 500 milliGRAM(s) IV Intermittent every 8 hours  piperacillin/tazobactam IVPB.. 3.375 Gram(s) IV Intermittent every 8 hours        ENDOCRINE  CAPILLARY BLOOD GLUCOSE        Meds:       ACCESS DEVICES:  [ ] Peripheral IV  [ ] Central Venous Line	[ ] R	[ ] L	[ ] IJ	[ ] Fem	[ ] SC	Placed:   [X ] Arterial Line		[ ] R	[X ] L	[ ] Fem	[X ] Rad	[ ] Ax	Placed: 8/11  [ ] PICC:					[ ] Mediport  [ X] Urinary Catheter, Date Placed: 8/11 placed by urology   [x] Necessity of urinary, arterial, and venous catheters discussed    OTHER MEDICATIONS:  benzocaine 20% Spray 1 Spray(s) Topical every 6 hours PRN  chlorhexidine 2% Cloths 1 Application(s) Topical <User Schedule>      CODE STATUS:      IMAGING:

## 2024-08-12 NOTE — PHYSICAL THERAPY INITIAL EVALUATION ADULT - NSPTDISCHREC_GEN_A_CORE
DC home with home PT services assist from family for mobility/ADls, will cont to assess DME needs during hospital stay and improve functional mobility, SW to be notified./Home PT

## 2024-08-12 NOTE — DIETITIAN INITIAL EVALUATION ADULT - ORAL INTAKE PTA/DIET HISTORY
-Pt and family reports that he has only been drinking [place] shakes and/or Ensure shakes for the past 2-3 weeks since chemotherapy. At baseline, pt able to eat solid foods, however appetite with decline, most significantly in past 7 weeks. Denies food allergies. Noted with tonsillar carcinoma and recently received swallow study which revealed no mechanical obstructions or abnormalities. Denies intake of vitamins at baseline.  -Pt and family reports that he has only been drinking [place] shakes and/or Ensure shakes for the past 2-3 weeks since chemotherapy. At baseline, pt able to eat solid foods, however appetite with decline, most significantly in past 7 weeks. Denies food allergies. Noted with tonsillar carcinoma; daughter reports that pt recently received swallow study which revealed no mechanical obstructions or abnormalities. Denies intake of vitamins at baseline.

## 2024-08-12 NOTE — ADVANCED PRACTICE NURSE CONSULT - REASON FOR CONSULT
Vascular Access Team    Evaluation for: Bedside PICC placement  Requested by name: John Porras  Date/Time: 8/12/2024    Indication for PICC: TPN  Allergy to CHG or Heparin or Lidocaine: No    Platelets(>20): 144  INR(<3): 1.23  eGFR(>40): 78  Blood cultures sent: No  Blood culture negative in 48hrs: NA  Anticoagulants: Lovenox 40mg  Arms DVT: No  Mastectomy: No  Fistula: No  PPM/Defib: No  IR or Nephrology or ID clearance needed: No    Consent obtained: No    Pending: Consent, TPN consult     Plan: Bedside picc order evaluated. Please obtain PICC placement consent and then call [phone] for the VAT RN to place the bedside picc.

## 2024-08-12 NOTE — DIETITIAN INITIAL EVALUATION ADULT - REASON INDICATOR FOR ASSESSMENT
Nutrition Assessment warranted for length of stay.  Information obtained from: RN, comprehensive chart review, interdisciplinary medical rounds, patient  Nutrition Assessment warranted for length of stay.  Information obtained from: RN, comprehensive chart review, interdisciplinary medical rounds, patient and family at bedside

## 2024-08-12 NOTE — PROGRESS NOTE ADULT - CRITICAL CARE ATTENDING COMMENT
Rectal CA remote.   Tonsillar CA.   Perf pyloric ulcer s/p ex-lap and Kash patch 8/11.     HTN, Smoker     NEURO: A&Ox4. Tylenol and dilaudid fpor pain control   5 days bowel rest     RESP: Room air.     CVS: Hemodynamically stbale.     GI: place PICC for TPN.   H Pylori stool antigen test  Protonix   JPs x2. 40ccoutput todal.     : No evidence of renal dysfunction. Sánchez placed by urology.     HEME: No evidence of nbleeding or coagulopahty.   Leovnox of DVT ppxo.     ID: Afebrile. WBC 5. Zosyn for fluc for 4 day course.     ENDO: Glucsoe controlled.     Rdial a line DC'ed. Patient examined by me on AM rounds.     #Perforated prepyloric ulcer     NEURO: A&Ox4. Tylenol and Dilaudid for pain control     RESP: Stable on room air.     CVS: Hemodynamically stale.     GI: s/p Kash patch repair of perforated pre-pyloric ulcer.   -Abdominal exam benign. JPs x2 in place with 40cc serosanguinous output total.  -5 days of bowel rest. UGI series on POD 5 prior to feeding.    -Place PICC for TPN.   -Follow up H Pylori stool antigen test  -Continue Protonix.      : No evidence of renal dysfunction.   -Sánchez placed by urology in OR. Likely undiagnosed BPH. Plan to start flomax 2 days prior to removing Sánchez when patient tolerating PO.     HEME: No evidence of bleeding or coagulopathy.   -Lovenox of DVT ppx.     ID: Afebrile. WBC 5. Zosyn and fluconazole for 4 day course.     ENDO: Glucose controlled.     Line:   -Sánchez 8/11.   -Radial a line DC'ed.

## 2024-08-12 NOTE — ADVANCED PRACTICE NURSE CONSULT - ASSESSMENT
Central Line Catheter Insertion Note  Patient or patient representative educated about central line associated blood stream infection prevention practices.  Catheter type: 4 F,  DL Picc  : Bard  Power injectable: Yes  LOT#[id number]  EXP DATE 2025-09-30    Informed consent obtained by covering floor team.  Procedure assisted by: MOLLY [place] RN  Time out was preformed, confirming the patient's first and last name, date of birth, MR #, procedure, and correct site prior to start of procedure.    Patient was placed with HOB 30 degrees. Patient placement site was prepped with chlorhexidine solution, then draped using maximum sterile barrier protection. The area was injected with 2ml of 1% lidocaine. Using the Bard Site Rite 8, the catheter was placed using the Modified Seldinger Technique. Strict adherence to outline aseptic technique including handwashing, glove and gown, utilizing mask and cap, plus draping the patient with a sterile drape was observed. Upon completion of line placement, the insertion site was covered with a sterile CHG dressing. Pt tolerated procedure well. V/S Continuous cardiac monitoring.     All materials used for catheter insertion, including the intact guide wires, were accounted for at the end of the procedure.  Number of attempts: 1  Complications/Comments: None    Emergency Placement: No  Site: New   Anatomical Site of insertion: R Basilic  Catheter size/length: 4F,   36cm  US guided Bard Double lumen power picc placed    Post procedure verification with chest Xray as per orders.

## 2024-08-12 NOTE — PROGRESS NOTE ADULT - SUBJECTIVE AND OBJECTIVE BOX
SUBJECTIVE: Pt seen at bedside. As per nurse and patient, no o/n events, patient resting comfortably. s/p ex-lap benedict patch to anterior gastric pyloric perforation,  2 left BELL drains, prevena vac in place, POD#1.    Physical Exam:  General Appearance: Appears well, NAD  Neck: Supple  Chest: Equal expansion bilaterally, equal breath sounds  CV: Pulse regular presently  Abdomen: Soft, nontender, prevena vac in place, 2 left BELL drains SS,  appropriate incisional tenderness, dressings clean and dry and intact  Extremities: Grossly symmetric, SCD's in place     Vital Signs Last 24 Hrs  T(C): 36.7 (12 Aug 2024 07:00), Max: 36.9 (11 Aug 2024 18:45)  T(F): 98.1 (12 Aug 2024 07:00), Max: 98.4 (11 Aug 2024 18:45)  HR: 54 (12 Aug 2024 09:00) (49 - 78)  BP: 142/70 (12 Aug 2024 08:00) (120/56 - 142/70)  BP(mean): 100 (12 Aug 2024 08:00) (88 - 100)  RR: 19 (12 Aug 2024 09:00) (12 - 24)  SpO2: 93% (12 Aug 2024 09:00) (93% - 98%)    Parameters below as of 12 Aug 2024 07:00  Patient On (Oxygen Delivery Method): room air        I&O's Summary    11 Aug 2024 07:01  -  12 Aug 2024 07:00  --------------------------------------------------------  IN: 1730 mL / OUT: 765 mL / NET: 965 mL    12 Aug 2024 07:01  -  12 Aug 2024 09:49  --------------------------------------------------------  IN: 325 mL / OUT: 150 mL / NET: 175 mL          LABS:                        9.9    5.63  )-----------( 144      ( 12 Aug 2024 03:27 )             29.2     08-12    139  |  107  |  24<H>  ----------------------------<  128<H>  4.4   |  20<L>  |  1.00    Ca    8.6      12 Aug 2024 03:27  Phos  3.7     08-12  Mg     1.8     08-12    TPro  5.0<L>  /  Alb  3.0<L>  /  TBili  0.6  /  DBili  x   /  AST  26  /  ALT  15  /  AlkPhos  43  08-12    PT/INR - ( 12 Aug 2024 03:27 )   PT: 13.4 sec;   INR: 1.23 ratio         PTT - ( 12 Aug 2024 03:27 )  PTT:29.1 sec  Urinalysis Basic - ( 12 Aug 2024 03:27 )    Color: x / Appearance: x / SG: x / pH: x  Gluc: 128 mg/dL / Ketone: x  / Bili: x / Urobili: x   Blood: x / Protein: x / Nitrite: x   Leuk Esterase: x / RBC: x / WBC x   Sq Epi: x / Non Sq Epi: x / Bacteria: x        RADIOLOGY & ADDITIONAL STUDIES:

## 2024-08-12 NOTE — DIETITIAN INITIAL EVALUATION ADULT - ETIOLOGY
Increased physiological demand of nutrient in setting of catabolic illness and acute GI surgery Inadequate energy/protein intake in setting of decreased appetite, chemotherapeutic effects, altered GI function s/p ex-lap

## 2024-08-12 NOTE — CONSULT NOTE ADULT - SUBJECTIVE AND OBJECTIVE BOX
CHIEF COMPLAINT:Patient is a 79y old  Male who presents with a chief complaint of Gastric vs Duodenal perforation (12 Aug 2024 03:51)      HISTORY OF PRESENT ILLNESS:    Mr. Fulton is a 79 year old man with a history of locally advanced rectal adenocarcinoma status post neoadjuvant chemoradiotherapy and abdominoperineal resection (Dr. 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 Intra-operatively he was noted to have a pinhole perforation of the anterior pylorus with spillage of bile, treated with a Kash patch using the falciform ligament, left with two drains (#1 in the lesser sac due to presence of bilious succus prior to washout, #2 overlying the repair), skin closed in interrupted fashion with a Prevena wound vac. He is now brought to the surgical intensive care unit post-operatively for close hemodynamic monitoring.         PAST MEDICAL & SURGICAL HISTORY:  Rectal Neoplasm  treated with oral chemothearpy and radiation 5/10- 6/10        Hemorrhoid      Malignant neoplasm of oropharynx      Colostomy status      Neck mass      S/P Colonoscopy  3/10      Rectal cancer      S/P colostomy      History of cancer chemotherapy      S/P radiation therapy              MEDICATIONS:  enoxaparin Injectable 40 milliGRAM(s) SubCutaneous every 24 hours    fluconAZOLE IVPB 400 milliGRAM(s) IV Intermittent every 24 hours  fluconAZOLE IVPB      metroNIDAZOLE  IVPB 500 milliGRAM(s) IV Intermittent every 8 hours  piperacillin/tazobactam IVPB.. 3.375 Gram(s) IV Intermittent every 8 hours      acetaminophen   IVPB .. 1000 milliGRAM(s) IV Intermittent every 8 hours PRN  HYDROmorphone  Injectable 0.25 milliGRAM(s) IV Push every 3 hours PRN  HYDROmorphone  Injectable 0.5 milliGRAM(s) IV Push every 3 hours PRN    pantoprazole  Injectable 40 milliGRAM(s) IV Push two times a day      benzocaine 20% Spray 1 Spray(s) Topical every 6 hours PRN  chlorhexidine 2% Cloths 1 Application(s) Topical <User Schedule>  lactated ringers. 1000 milliLiter(s) IV Continuous <Continuous>      FAMILY HISTORY:  FH: lung cancer (Father)        Non-contributory    SOCIAL HISTORY:    No tobacco, drugs or etoh    Allergies    No Known Allergies    Intolerances    	    REVIEW OF SYSTEMS:  as above  The rest of the 14 points ROS reviewed and except above they are unremarkable.        PHYSICAL EXAM:  T(C): 36.7 (08-12-24 @ 07:00), Max: 36.9 (08-11-24 @ 18:45)  HR: 54 (08-12-24 @ 09:00) (49 - 78)  BP: 142/70 (08-12-24 @ 08:00) (120/56 - 142/70)  RR: 19 (08-12-24 @ 09:00) (12 - 24)  SpO2: 93% (08-12-24 @ 09:00) (93% - 98%)  Wt(kg): --  I&O's Summary    11 Aug 2024 07:01  -  12 Aug 2024 07:00  --------------------------------------------------------  IN: 1730 mL / OUT: 765 mL / NET: 965 mL    12 Aug 2024 07:01  -  12 Aug 2024 09:59  --------------------------------------------------------  IN: 325 mL / OUT: 150 mL / NET: 175 mL    JVP: Normal  Neck: supple  Lung: clear   CV: S1 S2 , Murmur:  Abd: soft  Ext: No edema  neuro: Awake / alert  Psych: flat affect  Skin: normal      LABS/DATA:    TELEMETRY: 	  sinus   ECG:  	   	  CARDIAC MARKERS:                                      9.9    5.63  )-----------( 144      ( 12 Aug 2024 03:27 )             29.2     08-12    139  |  107  |  24<H>  ----------------------------<  128<H>  4.4   |  20<L>  |  1.00    Ca    8.6      12 Aug 2024 03:27  Phos  3.7     08-12  Mg     1.8     08-12    TPro  5.0<L>  /  Alb  3.0<L>  /  TBili  0.6  /  DBili  x   /  AST  26  /  ALT  15  /  AlkPhos  43  08-12    proBNP:   Lipid Profile:   HgA1c:   TSH:

## 2024-08-12 NOTE — DIETITIAN INITIAL EVALUATION ADULT - LAB (SPECIFY)
Deteriorating patient status - Patient was clinically upgraded due to deteriorating patient status. Trend BG levels, renal indices, LFT's, electrolytes and triglycerides

## 2024-08-13 ENCOUNTER — NON-APPOINTMENT (OUTPATIENT)
Age: 79
End: 2024-08-13

## 2024-08-13 LAB
A1C WITH ESTIMATED AVERAGE GLUCOSE RESULT: 5.8 % — HIGH (ref 4–5.6)
ALBUMIN SERPL ELPH-MCNC: 3.1 G/DL — LOW (ref 3.3–5)
ALP SERPL-CCNC: 50 U/L — SIGNIFICANT CHANGE UP (ref 40–120)
ALT FLD-CCNC: 13 U/L — SIGNIFICANT CHANGE UP (ref 10–45)
ANION GAP SERPL CALC-SCNC: 10 MMOL/L — SIGNIFICANT CHANGE UP (ref 5–17)
AST SERPL-CCNC: 28 U/L — SIGNIFICANT CHANGE UP (ref 10–40)
BILIRUB SERPL-MCNC: 0.5 MG/DL — SIGNIFICANT CHANGE UP (ref 0.2–1.2)
BUN SERPL-MCNC: 21 MG/DL — SIGNIFICANT CHANGE UP (ref 7–23)
CALCIUM SERPL-MCNC: 9 MG/DL — SIGNIFICANT CHANGE UP (ref 8.4–10.5)
CHLORIDE SERPL-SCNC: 105 MMOL/L — SIGNIFICANT CHANGE UP (ref 96–108)
CHOLEST SERPL-MCNC: 120 MG/DL — SIGNIFICANT CHANGE UP
CO2 SERPL-SCNC: 21 MMOL/L — LOW (ref 22–31)
CREAT SERPL-MCNC: 1.06 MG/DL — SIGNIFICANT CHANGE UP (ref 0.5–1.3)
EGFR: 72 ML/MIN/1.73M2 — SIGNIFICANT CHANGE UP
ESTIMATED AVERAGE GLUCOSE: 120 MG/DL — HIGH (ref 68–114)
FERRITIN SERPL-MCNC: 873 NG/ML — HIGH (ref 30–400)
FOLATE SERPL-MCNC: 3.6 NG/ML — LOW
GLUCOSE BLDC GLUCOMTR-MCNC: 105 MG/DL — HIGH (ref 70–99)
GLUCOSE BLDC GLUCOMTR-MCNC: 115 MG/DL — HIGH (ref 70–99)
GLUCOSE BLDC GLUCOMTR-MCNC: 123 MG/DL — HIGH (ref 70–99)
GLUCOSE BLDC GLUCOMTR-MCNC: 124 MG/DL — HIGH (ref 70–99)
GLUCOSE SERPL-MCNC: 103 MG/DL — HIGH (ref 70–99)
HCT VFR BLD CALC: 29.5 % — LOW (ref 39–50)
HDLC SERPL-MCNC: 46 MG/DL — SIGNIFICANT CHANGE UP
HGB BLD-MCNC: 10.2 G/DL — LOW (ref 13–17)
IRON SATN MFR SERPL: 17 % — SIGNIFICANT CHANGE UP (ref 16–55)
IRON SATN MFR SERPL: 24 UG/DL — LOW (ref 45–165)
LIPID PNL WITH DIRECT LDL SERPL: 59 MG/DL — SIGNIFICANT CHANGE UP
MAGNESIUM SERPL-MCNC: 2.1 MG/DL — SIGNIFICANT CHANGE UP (ref 1.6–2.6)
MCHC RBC-ENTMCNC: 31.7 PG — SIGNIFICANT CHANGE UP (ref 27–34)
MCHC RBC-ENTMCNC: 34.6 GM/DL — SIGNIFICANT CHANGE UP (ref 32–36)
MCV RBC AUTO: 91.6 FL — SIGNIFICANT CHANGE UP (ref 80–100)
NON HDL CHOLESTEROL: 74 MG/DL — SIGNIFICANT CHANGE UP
NRBC # BLD: 0 /100 WBCS — SIGNIFICANT CHANGE UP (ref 0–0)
PHOSPHATE SERPL-MCNC: 2.3 MG/DL — LOW (ref 2.5–4.5)
PLATELET # BLD AUTO: 135 K/UL — LOW (ref 150–400)
POTASSIUM SERPL-MCNC: 3.9 MMOL/L — SIGNIFICANT CHANGE UP (ref 3.5–5.3)
POTASSIUM SERPL-SCNC: 3.9 MMOL/L — SIGNIFICANT CHANGE UP (ref 3.5–5.3)
PREALB SERPL-MCNC: 11 MG/DL — LOW (ref 20–40)
PROT SERPL-MCNC: 5.6 G/DL — LOW (ref 6–8.3)
RBC # BLD: 3.22 M/UL — LOW (ref 4.2–5.8)
RBC # FLD: 15.5 % — HIGH (ref 10.3–14.5)
SODIUM SERPL-SCNC: 136 MMOL/L — SIGNIFICANT CHANGE UP (ref 135–145)
TIBC SERPL-MCNC: 136 UG/DL — LOW (ref 220–430)
TRANSFERRIN SERPL-MCNC: 108 MG/DL — LOW (ref 200–360)
TRIGL SERPL-MCNC: 74 MG/DL — SIGNIFICANT CHANGE UP
TSH SERPL-MCNC: 0.29 UIU/ML — SIGNIFICANT CHANGE UP (ref 0.27–4.2)
UIBC SERPL-MCNC: 112 UG/DL — SIGNIFICANT CHANGE UP (ref 110–370)
VIT B12 SERPL-MCNC: 479 PG/ML — SIGNIFICANT CHANGE UP (ref 232–1245)
WBC # BLD: 5.53 K/UL — SIGNIFICANT CHANGE UP (ref 3.8–10.5)
WBC # FLD AUTO: 5.53 K/UL — SIGNIFICANT CHANGE UP (ref 3.8–10.5)

## 2024-08-13 PROCEDURE — 99233 SBSQ HOSP IP/OBS HIGH 50: CPT

## 2024-08-13 RX ORDER — POTASSIUM PHOSPHATE 236; 224 MG/ML; MG/ML
30 INJECTION, SOLUTION INTRAVENOUS ONCE
Refills: 0 | Status: COMPLETED | OUTPATIENT
Start: 2024-08-13 | End: 2024-08-13

## 2024-08-13 RX ORDER — POTASSIUM PHOSPHATE 236; 224 MG/ML; MG/ML
30 INJECTION, SOLUTION INTRAVENOUS ONCE
Refills: 0 | Status: DISCONTINUED | OUTPATIENT
Start: 2024-08-13 | End: 2024-08-13

## 2024-08-13 RX ORDER — ACETAMINOPHEN 325 MG/1
1000 TABLET ORAL EVERY 6 HOURS
Refills: 0 | Status: COMPLETED | OUTPATIENT
Start: 2024-08-13 | End: 2024-08-14

## 2024-08-13 RX ORDER — ELECTROLYTE SOLUTION,INJ
1 VIAL (ML) INTRAVENOUS
Refills: 0 | Status: DISCONTINUED | OUTPATIENT
Start: 2024-08-13 | End: 2024-08-14

## 2024-08-13 RX ORDER — SODIUM PHOSPHATE, MONOBASIC, MONOHYDRATE AND SODIUM PHOSPHATE, DIBASIC ANHYDROUS 276; 142 MG/ML; MG/ML
30 INJECTION, SOLUTION INTRAVENOUS ONCE
Refills: 0 | Status: DISCONTINUED | OUTPATIENT
Start: 2024-08-13 | End: 2024-08-15

## 2024-08-13 RX ORDER — I.V. FAT EMULSION 20 G/100ML
8.3 EMULSION INTRAVENOUS
Qty: 20 | Refills: 0 | Status: DISCONTINUED | OUTPATIENT
Start: 2024-08-13 | End: 2024-08-14

## 2024-08-13 RX ADMIN — ACETAMINOPHEN 400 MILLIGRAM(S): 325 TABLET ORAL at 23:27

## 2024-08-13 RX ADMIN — ACETAMINOPHEN 400 MILLIGRAM(S): 325 TABLET ORAL at 01:14

## 2024-08-13 RX ADMIN — I.V. FAT EMULSION 8.3 ML/HR: 20 EMULSION INTRAVENOUS at 19:11

## 2024-08-13 RX ADMIN — ACETAMINOPHEN 400 MILLIGRAM(S): 325 TABLET ORAL at 17:12

## 2024-08-13 RX ADMIN — HYDROMORPHONE HYDROCHLORIDE 0.5 MILLIGRAM(S): 2 TABLET ORAL at 01:44

## 2024-08-13 RX ADMIN — Medication 40 MILLIGRAM(S): at 05:44

## 2024-08-13 RX ADMIN — HYDROMORPHONE HYDROCHLORIDE 0.5 MILLIGRAM(S): 2 TABLET ORAL at 20:11

## 2024-08-13 RX ADMIN — Medication 1 EACH: at 18:09

## 2024-08-13 RX ADMIN — FLUCONAZOLE 100 MILLIGRAM(S): 150 TABLET ORAL at 17:40

## 2024-08-13 RX ADMIN — Medication 1 EACH: at 07:21

## 2024-08-13 RX ADMIN — HYDROMORPHONE HYDROCHLORIDE 0.25 MILLIGRAM(S): 2 TABLET ORAL at 13:14

## 2024-08-13 RX ADMIN — HYDROMORPHONE HYDROCHLORIDE 0.25 MILLIGRAM(S): 2 TABLET ORAL at 05:43

## 2024-08-13 RX ADMIN — ACETAMINOPHEN 1000 MILLIGRAM(S): 325 TABLET ORAL at 01:44

## 2024-08-13 RX ADMIN — ENOXAPARIN SODIUM 40 MILLIGRAM(S): 100 INJECTION SUBCUTANEOUS at 05:44

## 2024-08-13 RX ADMIN — HYDROMORPHONE HYDROCHLORIDE 0.25 MILLIGRAM(S): 2 TABLET ORAL at 10:04

## 2024-08-13 RX ADMIN — HYDROMORPHONE HYDROCHLORIDE 0.25 MILLIGRAM(S): 2 TABLET ORAL at 06:13

## 2024-08-13 RX ADMIN — HYDROMORPHONE HYDROCHLORIDE 0.5 MILLIGRAM(S): 2 TABLET ORAL at 20:41

## 2024-08-13 RX ADMIN — CHLORHEXIDINE GLUCONATE 1 APPLICATION(S): 40 SOLUTION TOPICAL at 05:44

## 2024-08-13 RX ADMIN — ACETAMINOPHEN 1000 MILLIGRAM(S): 325 TABLET ORAL at 23:57

## 2024-08-13 RX ADMIN — Medication 1 EACH: at 19:11

## 2024-08-13 RX ADMIN — I.V. FAT EMULSION 8.3 ML/HR: 20 EMULSION INTRAVENOUS at 18:09

## 2024-08-13 RX ADMIN — PIPERACILLIN SODIUM AND TAZOBACTAM SODIUM 25 GRAM(S): 3; .375 INJECTION, POWDER, FOR SOLUTION INTRAVENOUS at 05:44

## 2024-08-13 RX ADMIN — PIPERACILLIN SODIUM AND TAZOBACTAM SODIUM 25 GRAM(S): 3; .375 INJECTION, POWDER, FOR SOLUTION INTRAVENOUS at 21:03

## 2024-08-13 RX ADMIN — HYDROMORPHONE HYDROCHLORIDE 0.25 MILLIGRAM(S): 2 TABLET ORAL at 13:44

## 2024-08-13 RX ADMIN — ACETAMINOPHEN 400 MILLIGRAM(S): 325 TABLET ORAL at 07:40

## 2024-08-13 RX ADMIN — HYDROMORPHONE HYDROCHLORIDE 0.25 MILLIGRAM(S): 2 TABLET ORAL at 17:42

## 2024-08-13 RX ADMIN — ACETAMINOPHEN 1000 MILLIGRAM(S): 325 TABLET ORAL at 17:42

## 2024-08-13 RX ADMIN — PIPERACILLIN SODIUM AND TAZOBACTAM SODIUM 25 GRAM(S): 3; .375 INJECTION, POWDER, FOR SOLUTION INTRAVENOUS at 15:02

## 2024-08-13 RX ADMIN — HYDROMORPHONE HYDROCHLORIDE 0.5 MILLIGRAM(S): 2 TABLET ORAL at 01:14

## 2024-08-13 RX ADMIN — HYDROMORPHONE HYDROCHLORIDE 0.25 MILLIGRAM(S): 2 TABLET ORAL at 17:10

## 2024-08-13 RX ADMIN — ACETAMINOPHEN 1000 MILLIGRAM(S): 325 TABLET ORAL at 08:10

## 2024-08-13 RX ADMIN — HYDROMORPHONE HYDROCHLORIDE 0.25 MILLIGRAM(S): 2 TABLET ORAL at 10:34

## 2024-08-13 RX ADMIN — Medication 40 MILLIGRAM(S): at 17:13

## 2024-08-13 NOTE — PROGRESS NOTE ADULT - ASSESSMENT
Perforated pyloric ulcer   s/p repair   fu with surgery     HTN  resume outpt meds    sinus bradycardia   asymptomatic  stable now

## 2024-08-13 NOTE — PROGRESS NOTE ADULT - ASSESSMENT
Mr. Fulton is a 79 year old man with a history of locally advanced rectal adenocarcinoma status post neoadjuvant chemoradiotherapy and abdominoperineal resection (Dr. 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 Intra-operatively he was noted to have a pinhole perforation of the anterior pylorus with spillage of bile, treated with a Kash patch using the falciform ligament, left with two drains (#1 in the lesser sac due to presence of bilious succus prior to washout, #2 overlying the repair), skin closed in interrupted fashion with a Prevena wound vac. He is now brought to the surgical intensive care unit post-operatively for close hemodynamic monitoring.     PLAN:    Neuro:  - Multimodal pain control w/ Tylenol and Dilaudid     Resp:  - Out of bed to chair, ambulate as tolerated, and incentive spirometry to prevent atelectasis  - 2L NC   - Benzocaine oral spray for throat     CV:  - MAP > 65 mmHg    GI: # pyloric ulcer  - NPO with NGT to low suction for bowel rest x 5 days   - TPN consult and PICC placed   - Protonix BID for H.Pylori ppx pending stool antigen test    Renal:  - Monitor I&Os and electrolytes w/ repletions as necessary  - Sánchez   - LR@75    Heme:  - Monitor CBC and coags  - Lovenox for VTE prophylaxis    ID: # ppx treat H. Pylori due to pyloric ulcer perforation   - Monitor for clinical evidence of active infection  - Monitor WBC, temperature, and procalcitonin  - Flagyl, ZOsyn, Vancomycin, Fluconazole       Endo:   - MAREN     Code Status: Full code     SICU   [id number]    Thiago Sales PA-C  [id number]

## 2024-08-13 NOTE — PROGRESS NOTE ADULT - SUBJECTIVE AND OBJECTIVE BOX
[facility] NUTRITION SUPPORT-- FOLLOW UP NOTE      24 hour events/subjective:  Patient seen and examined at bedside, chart reviewed and events noted and I's and O's reviewed.  Patient denies chest pain, shortness of breath, nausea or vomiting, dizziness, chills at time of visit; transferred out of SICU to [address] [place] [address]; continues with NGT decompression; K phos repletion noted.  Patient's VSS except bradycardia (cardio following) and no other acute overnight events noted.   Patient tolerated TPN initiation and glucose trend is within appropriate range.      ROS:  Except as noted above, all other systems reviewed and are negative     Diet:  Diet, NPO (08-11-24 @ 19:31)      PAST HISTORY  --------------------------------------------------------------------------------  PAST MEDICAL & SURGICAL HISTORY:  Rectal Neoplasm  treated with oral chemothearpy and radiation 5/10- 6/10        Hemorrhoid      Malignant neoplasm of oropharynx      Colostomy status      Neck mass      S/P Colonoscopy  3/10      Rectal cancer      S/P colostomy      History of cancer chemotherapy      S/P radiation therapy        No significant changes to PMH, PSH, FHx, SHx, unless otherwise noted    ALLERGIES & MEDICATIONS  --------------------------------------------------------------------------------  Allergies    No Known Allergies    Intolerances      Standing Inpatient Medications  chlorhexidine 2% Cloths 1 Application(s) Topical <User Schedule>  enoxaparin Injectable 40 milliGRAM(s) SubCutaneous every 24 hours  fluconAZOLE IVPB 400 milliGRAM(s) IV Intermittent every 24 hours  fluconAZOLE IVPB      insulin lispro (ADMELOG) corrective regimen sliding scale   SubCutaneous every 6 hours  lipid, fat emulsion (Fish Oil and Plant Based) 20% Infusion 8.3 mL/Hr IV Continuous <Continuous>  pantoprazole  Injectable 40 milliGRAM(s) IV Push two times a day  Parenteral Nutrition - Adult 1 Each TPN Continuous <Continuous>  Parenteral Nutrition - Adult 1 Each TPN Continuous <Continuous>  piperacillin/tazobactam IVPB.. 3.375 Gram(s) IV Intermittent every 8 hours  sodium phosphate 30 milliMole(s)/500 mL IVPB 30 milliMole(s) IV Intermittent once    PRN Inpatient Medications  benzocaine 20% Spray 1 Spray(s) Topical every 6 hours PRN  HYDROmorphone  Injectable 0.5 milliGRAM(s) IV Push every 3 hours PRN  HYDROmorphone  Injectable 0.25 milliGRAM(s) IV Push every 3 hours PRN        VITALS/PHYSICAL EXAM  --------------------------------------------------------------------------------  T(C): 36.9 (08-13-24 @ 09:05), Max: 36.9 (08-13-24 @ 09:05)  HR: 67 (08-13-24 @ 09:05) (50 - 92)  BP: 152/72 (08-13-24 @ 09:05) (100/56 - 180/81)  RR: 18 (08-13-24 @ 09:05) (12 - 27)  SpO2: 95% (08-13-24 @ 09:05) (93% - 97%)  Wt(kg): --  Height (cm): 180.3 (08-11-24 @ 18:45)  Weight (kg): 68.8 (08-11-24 @ 18:45)  BMI (kg/m2): 21.2 (08-11-24 @ 18:45)  BSA (m2): 1.87 (08-11-24 @ 18:45)    08-12-24 @ 07:01  -  08-13-24 @ 07:00  --------------------------------------------------------  IN: 2962 mL / OUT: 2325 mL / NET: 637 mL    08-13-24 @ 07:01  -  08-13-24 @ 12:15  --------------------------------------------------------  IN: 0 mL / OUT: 590 mL / NET: -590 mL      I&O's Detail    12 Aug 2024 07:01  -  13 Aug 2024 07:00  --------------------------------------------------------  IN:    IV PiggyBack: 400 mL    IV PiggyBack: 575 mL    Lactated Ringers: 825 mL    TPN (Total Parenteral Nutrition): 1162 mL  Total IN: 2962 mL    OUT:    Bulb (mL): 60 mL    Bulb (mL): 220 mL    Colostomy (mL): 0 mL    Indwelling Catheter - Urethral (mL): 1845 mL    Nasogastric/Oral tube (mL): 200 mL    VAC (Vacuum Assisted Closure) System (mL): 0 mL  Total OUT: 2325 mL    Total NET: 637 mL      13 Aug 2024 07:01  -  13 Aug 2024 12:15  --------------------------------------------------------  IN:  Total IN: 0 mL    OUT:    Bulb (mL): 20 mL    Bulb (mL): 20 mL    Colostomy (mL): 0 mL    Indwelling Catheter - Urethral (mL): 550 mL  Total OUT: 590 mL    Total NET: -590 mL          Physical Exam:  Gen: NAD, frail appearing  HEENT: PERRL, supple neck, no JVD; +NGT; Neck discoloration/darkening  Chest: non labored breathing, equal chest expansion bilaterally  Abd:  Soft, nontender, prevena vac in place, 2 left BELL drains SS, appropriate incisional tenderness, dressings c/d/i  : No suprapubic tenderness  Ext: Warm, FROM, no edema b/l LE  Neuro: No focal deficits  Psych: Normal affect and mood  Skin: Warm, without rashes       LABS/STUDIES  --------------------------------------------------------------------------------              10.2   5.53  >-----------<  135      [08-13-24 @ 00:21]              29.5     136  |  105  |  21  ----------------------------<  103      [08-13-24 @ 00:21]  3.9   |  21  |  1.06        Ca     9.0     [08-13-24 @ 00:21]      Mg     2.1     [08-13-24 @ 00:21]      Phos  2.3     [08-13-24 @ 00:21]    TPro  5.6  /  Alb  3.1  /  TBili  0.5  /  DBili  x   /  AST  28  /  ALT  13  /  AlkPhos  50  [08-13-24 @ 00:21]    PT/INR: PT 13.4 , INR 1.23       [08-12-24 @ 03:27]  PTT: 29.1       [08-12-24 @ 03:27]      Ca ionizedBlood Gas Arterial - Calcium, Ionized: 1.21 mmol/L (08-12-24 @ 03:15)  Blood Gas Arterial - Calcium, Ionized: 1.21 mmol/L (08-11-24 @ 20:26)    Creatinine Trend:  POC glucoseGlucose: 103 mg/dL (08-13-24 @ 00:21)  CAPILLARY BLOOD GLUCOSE      POCT Blood Glucose.: 123 mg/dL (13 Aug 2024 11:55)  POCT Blood Glucose.: 105 mg/dL (13 Aug 2024 05:57)  POCT Blood Glucose.: 99 mg/dL (12 Aug 2024 18:05)    PrealbuminPrealbumin, Serum: 11 mg/dL (08-12-24 @ 16:28)    Triglycerides

## 2024-08-13 NOTE — PROGRESS NOTE ADULT - SUBJECTIVE AND OBJECTIVE BOX
Subjective: Patient seen and examined. No new events except as noted.     SUBJECTIVE/ROS:  nad      MEDICATIONS:  MEDICATIONS  (STANDING):  chlorhexidine 2% Cloths 1 Application(s) Topical <User Schedule>  enoxaparin Injectable 40 milliGRAM(s) SubCutaneous every 24 hours  fluconAZOLE IVPB      fluconAZOLE IVPB 400 milliGRAM(s) IV Intermittent every 24 hours  insulin lispro (ADMELOG) corrective regimen sliding scale   SubCutaneous every 6 hours  pantoprazole  Injectable 40 milliGRAM(s) IV Push two times a day  Parenteral Nutrition - Adult 1 Each (83 mL/Hr) TPN Continuous <Continuous>  piperacillin/tazobactam IVPB.. 3.375 Gram(s) IV Intermittent every 8 hours  sodium phosphate 30 milliMole(s)/500 mL IVPB 30 milliMole(s) IV Intermittent once      PHYSICAL EXAM:  T(C): 36.7 (08-13-24 @ 06:47), Max: 36.7 (08-13-24 @ 06:47)  HR: 75 (08-13-24 @ 06:47) (50 - 92)  BP: 157/89 (08-13-24 @ 06:47) (100/56 - 180/81)  RR: 18 (08-13-24 @ 06:47) (12 - 27)  SpO2: 93% (08-13-24 @ 06:47) (93% - 97%)  Wt(kg): --  I&O's Summary    12 Aug 2024 07:01  -  13 Aug 2024 07:00  --------------------------------------------------------  IN: 2962 mL / OUT: 2325 mL / NET: 637 mL            JVP: Normal  Neck: supple  Lung: clear   CV: S1 S2 , Murmur:  Abd: soft  Ext: No edema  neuro: Awake / alert  Psych: flat affect  Skin: normal``    LABS/DATA:    CARDIAC MARKERS:                                10.2   5.53  )-----------( 135      ( 13 Aug 2024 00:21 )             29.5     08-13    136  |  105  |  21  ----------------------------<  103<H>  3.9   |  21<L>  |  1.06    Ca    9.0      13 Aug 2024 00:21  Phos  2.3     08-13  Mg     2.1     08-13    TPro  5.6<L>  /  Alb  3.1<L>  /  TBili  0.5  /  DBili  x   /  AST  28  /  ALT  13  /  AlkPhos  50  08-13    proBNP:   Lipid Profile:   HgA1c:   TSH: Thyroid Stimulating Hormone, Serum: 0.29 uIU/mL (08-12 @ 16:28)      TELE:  EKG:

## 2024-08-13 NOTE — PROGRESS NOTE ADULT - SUBJECTIVE AND OBJECTIVE BOX
Surgical Critical Care Progress    Reason for SICU admission    Interval events    ___________________________________________________  Neurologic  Exam:   Medications  acetaminophen   IVPB .. 1000 milliGRAM(s) IV Intermittent every 6 hours  HYDROmorphone  Injectable 0.5 milliGRAM(s) IV Push every 3 hours PRN Severe Pain (7 - 10)  HYDROmorphone  Injectable 0.25 milliGRAM(s) IV Push every 3 hours PRN Moderate Pain (4 - 6)    Respiratory  RR: 13 (13 - 27)  SpO2: 95% (93% - 96%)  Exam:  ABG - ( 12 Aug 2024 03:15 )  pH: 7.40  /  pCO2: 35    /  pO2: 98    / HCO3: 22    / Base Excess: -2.6  /  SaO2: 99.5 / Lactate: x        Cardiovascular  HR: 53 (49 - 92)  BP: 115/58 (100/56 - 180/81)  BP(mean): 81 (65 - 122)  ABP: 126/56 (111/48 - 126/56)  ABP(mean): 80 (69 - 80)  ABG - ( 12 Aug 2024 03:15 )  pH: 7.40  /  pCO2: 35    /  pO2: 98    / HCO3: 22    / Base Excess: -2.6  /  SaO2: 99.5 / Lactate: x      VBG - ( 11 Aug 2024 06:22 )  pH: 7.36  /  pCO2: 39    /  pO2: 64    / HCO3: 22    / Base Excess: -3.2  /  SaO2: 92.7 / Lactate: 1.4    Exam:   Rhythm:   Perfusion status:   Volume status:   Medications    Gastroenterology and Nutrition  Exam:   Diet:   Metabolic Panel (08-13 @ 00:21)  136  |  105  |  21  ----------------------------<  103  3.9   |  21  |  1.06  Ca: 9.0/Phos: 2.3/Magnesium: 2.1  Liver Chemistries (08-13 @ 00:21)  Total protein 5.6 | Albumin 3.1  TBili 0.5 | DBili x  AST 28 | ALT 13 | AlkPhos 50  Medications  pantoprazole  Injectable 40 milliGRAM(s) IV Push two times a day    Genitourinary and Electrolytes  Intake and Output  08-12 @ 07:01  -  08-13 @ 03:17  --------------------------------------------------------  IN:    IV PiggyBack: 500 mL    IV PiggyBack: 300 mL    Lactated Ringers: 825 mL    TPN (Total Parenteral Nutrition): 747 mL  Total IN: 2372 mL  OUT:    Bulb (mL): 150 mL    Bulb (mL): 30 mL    Colostomy (mL): 0 mL    Indwelling Catheter - Urethral (mL): 1350 mL    Nasogastric/Oral tube (mL): 150 mL  Total OUT: 1680 mL  Medications  Parenteral Nutrition - Adult 1 Each TPN Continuous <Continuous>  potassium phosphate IVPB 30 milliMole(s) IV Intermittent once    Hematologic  CBC Basic (08-13 @ 00:21)  WBC: 5.53 Hgb: 10.2 Hct: 29.5 Plt: 135  Coagulation Studies (08-12 @ 03:27)  PT/INR: 13.4/1.23, aPTT: 29.1  Medications  enoxaparin Injectable 40 milliGRAM(s) SubCutaneous every 24 hours    Infectious Diseases  T(C): 36.1 (08-12 @ 23:00), Max: 36.7 (08-12 @ 07:00)  WBC Count: 5.53 K/uL (08-13 @ 00:21)  WBC Count: 5.28 K/uL (08-12 @ 16:28)  WBC Count: 5.63 K/uL (08-12 @ 03:27)  Recent Cultures:  Specimen Source: .Body Fluid Right Upper Quadrant Fluid, 08-12 @ 09:38; Results --; Gram Stain:   polymorphonuclear leukocytes seen  Gram positive cocci in pairs seen  by cytocentrifuge<!>; Organism: --  Medications  ·	chlorhexidine 2% Cloths 1 Application(s) Topical <User Schedule>  ·	fluconAZOLE IVPB 400 milliGRAM(s) IV Intermittent every 24 hours  ·	piperacillin/tazobactam IVPB.. 3.375 Gram(s) IV Intermittent every 8 hours    Endocrine  Point of care glucose  99 mg/dL (08-12 @ 18:05)  113 mg/dL (08-12 @ 11:25)  Medications  ·	insulin lispro (ADMELOG) corrective regimen sliding scale   SubCutaneous every 6 hours    Additional Medications  ·	benzocaine 20% Spray 1 Spray(s) Topical every 6 hours PRN  ___________________________________________________  Vascular Access    ___________________________________________________  Imaging  Xray Chest 1 View (08-11 @ 22:10)  Enteric tube with tip in the stomach. The heart size is not accurately assessed on this projection. The lungs are clear. There is no pneumothorax or pleural effusion. Surgical Critical Care Progress    Reason for SICU admission  Close post-operative monitoring.     Interval events  - Arterial line discontinued.   - Sent stool sample for H pylori testing.   - Body fluid cultures growing gram positive cocci in pairs.   ___________________________________________________  Neurologic  Exam:   Medications  acetaminophen   IVPB .. 1000 milliGRAM(s) IV Intermittent every 6 hours  HYDROmorphone  Injectable 0.5 milliGRAM(s) IV Push every 3 hours PRN Severe Pain (7 - 10)  HYDROmorphone  Injectable 0.25 milliGRAM(s) IV Push every 3 hours PRN Moderate Pain (4 - 6)    Respiratory  RR: 13 (13 - 27)  SpO2: 95% (93% - 96%)  Exam:  ABG - ( 12 Aug 2024 03:15 )  pH: 7.40  /  pCO2: 35    /  pO2: 98    / HCO3: 22    / Base Excess: -2.6  /  SaO2: 99.5 / Lactate: x        Cardiovascular  HR: 53 (49 - 92)  BP: 115/58 (100/56 - 180/81)  BP(mean): 81 (65 - 122)  ABP: 126/56 (111/48 - 126/56)  ABP(mean): 80 (69 - 80)  ABG - ( 12 Aug 2024 03:15 )  pH: 7.40  /  pCO2: 35    /  pO2: 98    / HCO3: 22    / Base Excess: -2.6  /  SaO2: 99.5 / Lactate: x      VBG - ( 11 Aug 2024 06:22 )  pH: 7.36  /  pCO2: 39    /  pO2: 64    / HCO3: 22    / Base Excess: -3.2  /  SaO2: 92.7 / Lactate: 1.4    Exam:   Rhythm:   Perfusion status:   Volume status:   Medications    Gastroenterology and Nutrition  Exam:   Diet:   Metabolic Panel (08-13 @ 00:21)  136  |  105  |  21  ----------------------------<  103  3.9   |  21  |  1.06  Ca: 9.0/Phos: 2.3/Magnesium: 2.1  Liver Chemistries (08-13 @ 00:21)  Total protein 5.6 | Albumin 3.1  TBili 0.5 | DBili x  AST 28 | ALT 13 | AlkPhos 50  Medications  pantoprazole  Injectable 40 milliGRAM(s) IV Push two times a day    Genitourinary and Electrolytes  Intake and Output  08-12 @ 07:01  -  08-13 @ 03:17  --------------------------------------------------------  IN:    IV PiggyBack: 500 mL    IV PiggyBack: 300 mL    Lactated Ringers: 825 mL    TPN (Total Parenteral Nutrition): 747 mL  Total IN: 2372 mL  OUT:    Bulb (mL): 150 mL    Bulb (mL): 30 mL    Colostomy (mL): 0 mL    Indwelling Catheter - Urethral (mL): 1350 mL    Nasogastric/Oral tube (mL): 150 mL  Total OUT: 1680 mL  Medications  Parenteral Nutrition - Adult 1 Each TPN Continuous <Continuous>  potassium phosphate IVPB 30 milliMole(s) IV Intermittent once    Hematologic  CBC Basic (08-13 @ 00:21)  WBC: 5.53 Hgb: 10.2 Hct: 29.5 Plt: 135  Coagulation Studies (08-12 @ 03:27)  PT/INR: 13.4/1.23, aPTT: 29.1  Medications  enoxaparin Injectable 40 milliGRAM(s) SubCutaneous every 24 hours    Infectious Diseases  T(C): 36.1 (08-12 @ 23:00), Max: 36.7 (08-12 @ 07:00)  WBC Count: 5.53 K/uL (08-13 @ 00:21)  WBC Count: 5.28 K/uL (08-12 @ 16:28)  WBC Count: 5.63 K/uL (08-12 @ 03:27)  Recent Cultures:  Specimen Source: .Body Fluid Right Upper Quadrant Fluid, 08-12 @ 09:38; Results --; Gram Stain:   polymorphonuclear leukocytes seen  Gram positive cocci in pairs seen  by cytocentrifuge<!>; Organism: --  Medications  ·	chlorhexidine 2% Cloths 1 Application(s) Topical <User Schedule>  ·	fluconAZOLE IVPB 400 milliGRAM(s) IV Intermittent every 24 hours  ·	piperacillin/tazobactam IVPB.. 3.375 Gram(s) IV Intermittent every 8 hours    Endocrine  Point of care glucose  99 mg/dL (08-12 @ 18:05)  113 mg/dL (08-12 @ 11:25)  Medications  ·	insulin lispro (ADMELOG) corrective regimen sliding scale   SubCutaneous every 6 hours    Additional Medications  ·	benzocaine 20% Spray 1 Spray(s) Topical every 6 hours PRN  ___________________________________________________  Vascular Access    ___________________________________________________  Imaging  Xray Chest 1 View (08-11 @ 22:10)  Enteric tube with tip in the stomach. The heart size is not accurately assessed on this projection. The lungs are clear. There is no pneumothorax or pleural effusion. Surgical Critical Care Progress    Reason for SICU admission  Close post-operative monitoring.     Interval events  - Arterial line discontinued.   - Sent stool sample for H pylori testing.   - Body fluid cultures growing gram positive cocci in pairs.   ___________________________________________________  Neurologic  Exam: AOx4  Medications  acetaminophen   IVPB .. 1000 milliGRAM(s) IV Intermittent every 6 hours  HYDROmorphone  Injectable 0.5 milliGRAM(s) IV Push every 3 hours PRN Severe Pain (7 - 10)  HYDROmorphone  Injectable 0.25 milliGRAM(s) IV Push every 3 hours PRN Moderate Pain (4 - 6)    Respiratory  RR: 13 (13 - 27)  SpO2: 95% (93% - 96%)  Exam: lungs CTA b/l  ABG - ( 12 Aug 2024 03:15 )  pH: 7.40  /  pCO2: 35    /  pO2: 98    / HCO3: 22    / Base Excess: -2.6  /  SaO2: 99.5 / Lactate: x        Cardiovascular  HR: 53 (49 - 92)  BP: 115/58 (100/56 - 180/81)  BP(mean): 81 (65 - 122)  ABP: 126/56 (111/48 - 126/56)  ABP(mean): 80 (69 - 80)  ABG - ( 12 Aug 2024 03:15 )  pH: 7.40  /  pCO2: 35    /  pO2: 98    / HCO3: 22    / Base Excess: -2.6  /  SaO2: 99.5 / Lactate: x      VBG - ( 11 Aug 2024 06:22 )  pH: 7.36  /  pCO2: 39    /  pO2: 64    / HCO3: 22    / Base Excess: -3.2  /  SaO2: 92.7 / Lactate: 1.4    Exam: normal S1/S2 w/o murmurs or rubs  Rhythm: sinus  Perfusion status: adequate  Volume status: euvolemic  Medications    Gastroenterology and Nutrition  Exam: abd non distended, non TTP  Diet: NPO  Metabolic Panel (08-13 @ 00:21)  136  |  105  |  21  ----------------------------<  103  3.9   |  21  |  1.06  Ca: 9.0/Phos: 2.3/Magnesium: 2.1  Liver Chemistries (08-13 @ 00:21)  Total protein 5.6 | Albumin 3.1  TBili 0.5 | DBili x  AST 28 | ALT 13 | AlkPhos 50  Medications  pantoprazole  Injectable 40 milliGRAM(s) IV Push two times a day    Genitourinary and Electrolytes  Intake and Output  08-12 @ 07:01  -  08-13 @ 03:17  --------------------------------------------------------  IN:    IV PiggyBack: 500 mL    IV PiggyBack: 300 mL    Lactated Ringers: 825 mL    TPN (Total Parenteral Nutrition): 747 mL  Total IN: 2372 mL  OUT:    Bulb (mL): 150 mL    Bulb (mL): 30 mL    Colostomy (mL): 0 mL    Indwelling Catheter - Urethral (mL): 1350 mL    Nasogastric/Oral tube (mL): 150 mL  Total OUT: 1680 mL  Medications  Parenteral Nutrition - Adult 1 Each TPN Continuous <Continuous>  potassium phosphate IVPB 30 milliMole(s) IV Intermittent once    Hematologic  CBC Basic (08-13 @ 00:21)  WBC: 5.53 Hgb: 10.2 Hct: 29.5 Plt: 135  Coagulation Studies (08-12 @ 03:27)  PT/INR: 13.4/1.23, aPTT: 29.1  Medications  enoxaparin Injectable 40 milliGRAM(s) SubCutaneous every 24 hours    Infectious Diseases  T(C): 36.1 (08-12 @ 23:00), Max: 36.7 (08-12 @ 07:00)  WBC Count: 5.53 K/uL (08-13 @ 00:21)  WBC Count: 5.28 K/uL (08-12 @ 16:28)  WBC Count: 5.63 K/uL (08-12 @ 03:27)  Recent Cultures:  Specimen Source: .Body Fluid Right Upper Quadrant Fluid, 08-12 @ 09:38; Results --; Gram Stain:   polymorphonuclear leukocytes seen  Gram positive cocci in pairs seen  by cytocentrifuge<!>; Organism: --  Medications  ·	chlorhexidine 2% Cloths 1 Application(s) Topical <User Schedule>  ·	fluconAZOLE IVPB 400 milliGRAM(s) IV Intermittent every 24 hours  ·	piperacillin/tazobactam IVPB.. 3.375 Gram(s) IV Intermittent every 8 hours    Endocrine  Point of care glucose  99 mg/dL (08-12 @ 18:05)  113 mg/dL (08-12 @ 11:25)  Medications  ·	insulin lispro (ADMELOG) corrective regimen sliding scale   SubCutaneous every 6 hours    Additional Medications  ·	benzocaine 20% Spray 1 Spray(s) Topical every 6 hours PRN  ___________________________________________________  Vascular Access    ___________________________________________________  Imaging  Xray Chest 1 View (08-11 @ 22:10)  Enteric tube with tip in the stomach. The heart size is not accurately assessed on this projection. The lungs are clear. There is no pneumothorax or pleural effusion.

## 2024-08-13 NOTE — PROGRESS NOTE ADULT - SUBJECTIVE AND OBJECTIVE BOX
SURGERY DAILY PROGRESS NOTE:       Subjective / Overnight events:  Transferred out of SICU to floor.  Pt c/o phlegm, denies N/V.  No flatus.  Pain controlled with medications.      Objective:      MEDICATIONS  (STANDING):  acetaminophen   IVPB .. 1000 milliGRAM(s) IV Intermittent every 6 hours  chlorhexidine 2% Cloths 1 Application(s) Topical <User Schedule>  enoxaparin Injectable 40 milliGRAM(s) SubCutaneous every 24 hours  fluconAZOLE IVPB      fluconAZOLE IVPB 400 milliGRAM(s) IV Intermittent every 24 hours  insulin lispro (ADMELOG) corrective regimen sliding scale   SubCutaneous every 6 hours  lipid, fat emulsion (Fish Oil and Plant Based) 20% Infusion 8.3 mL/Hr (8.3 mL/Hr) IV Continuous <Continuous>  pantoprazole  Injectable 40 milliGRAM(s) IV Push two times a day  Parenteral Nutrition - Adult 1 Each (83 mL/Hr) TPN Continuous <Continuous>  Parenteral Nutrition - Adult 1 Each (83 mL/Hr) TPN Continuous <Continuous>  piperacillin/tazobactam IVPB.. 3.375 Gram(s) IV Intermittent every 8 hours  sodium phosphate 30 milliMole(s)/500 mL IVPB 30 milliMole(s) IV Intermittent once    MEDICATIONS  (PRN):  benzocaine 20% Spray 1 Spray(s) Topical every 6 hours PRN Sore throat  HYDROmorphone  Injectable 0.5 milliGRAM(s) IV Push every 3 hours PRN Severe Pain (7 - 10)  HYDROmorphone  Injectable 0.25 milliGRAM(s) IV Push every 3 hours PRN Moderate Pain (4 - 6)      Vital Signs Last 24 Hrs  T(C): 36.9 (13 Aug 2024 09:05), Max: 36.9 (13 Aug 2024 09:05)  T(F): 98.4 (13 Aug 2024 09:05), Max: 98.4 (13 Aug 2024 09:05)  HR: 67 (13 Aug 2024 09:05) (50 - 92)  BP: 152/72 (13 Aug 2024 09:05) (100/56 - 180/81)  BP(mean): 86 (13 Aug 2024 06:00) (65 - 122)  RR: 18 (13 Aug 2024 09:05) (12 - 27)  SpO2: 95% (13 Aug 2024 09:05) (93% - 97%)    Parameters below as of 13 Aug 2024 09:05  Patient On (Oxygen Delivery Method): room air        I&O's Detail    12 Aug 2024 07:01  -  13 Aug 2024 07:00  --------------------------------------------------------  IN:    IV PiggyBack: 400 mL    IV PiggyBack: 575 mL    Lactated Ringers: 825 mL    TPN (Total Parenteral Nutrition): 1162 mL  Total IN: 2962 mL    OUT:    Bulb (mL): 60 mL    Bulb (mL): 220 mL    Colostomy (mL): 0 mL    Indwelling Catheter - Urethral (mL): 1845 mL    Nasogastric/Oral tube (mL): 200 mL    VAC (Vacuum Assisted Closure) System (mL): 0 mL  Total OUT: 2325 mL    Total NET: 637 mL      13 Aug 2024 07:01  -  13 Aug 2024 12:51  --------------------------------------------------------  IN:  Total IN: 0 mL    OUT:    Bulb (mL): 20 mL    Bulb (mL): 20 mL    Colostomy (mL): 0 mL    Indwelling Catheter - Urethral (mL): 550 mL  Total OUT: 590 mL    Total NET: -590 mL          Daily     Daily     LABS:                        10.2   5.53  )-----------( 135      ( 13 Aug 2024 00:21 )             29.5     08-13    136  |  105  |  21  ----------------------------<  103<H>  3.9   |  21<L>  |  1.06    Ca    9.0      13 Aug 2024 00:21  Phos  2.3     08-13  Mg     2.1     08-13    TPro  5.6<L>  /  Alb  3.1<L>  /  TBili  0.5  /  DBili  x   /  AST  28  /  ALT  13  /  AlkPhos  50  08-13    PT/INR - ( 12 Aug 2024 03:27 )   PT: 13.4 sec;   INR: 1.23 ratio         PTT - ( 12 Aug 2024 03:27 )  PTT:29.1 sec  Urinalysis Basic - ( 13 Aug 2024 00:21 )    Color: x / Appearance: x / SG: x / pH: x  Gluc: 103 mg/dL / Ketone: x  / Bili: x / Urobili: x   Blood: x / Protein: x / Nitrite: x   Leuk Esterase: x / RBC: x / WBC x   Sq Epi: x / Non Sq Epi: x / Bacteria: x          Physical Exam:  General Appearance: Appears well, NAD  Neck: Supple  Chest: Equal expansion bilaterally, equal breath sounds  CV: Pulse regular presently  Abdomen: Soft, nontender, prevena vac in place, 2 left BELL drains SS,  appropriate incisional tenderness, dressings clean and dry and intact  Extremities: Grossly symmetric, SCD's in place

## 2024-08-13 NOTE — PROGRESS NOTE ADULT - ASSESSMENT
Mr. Fulton is a 79 year old man with a history of locally advanced rectal adenocarcinoma status post neoadjuvant chemoradiotherapy and abdominoperineal resection (Dr. 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    Plan:  - NPO/NGT  - TPN  - PPI BID for ulcer ppx  - NPO for 5 days (8/16) --> upper G series to r/o leak  - pain control  - OOB/amb  - PT eval[place] [facility] [place] care surgery, pager#[phone]

## 2024-08-13 NOTE — PROGRESS NOTE ADULT - ASSESSMENT
79 year old man with a history of locally advanced rectal adenocarcinoma status post neoadjuvant chemoradiotherapy and abdominoperineal resection (Dr. 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   Patient planned for NPO x 5 days followed by UGI series to check for leak prior to considering enteral diet.  TPN team consulted given anticipated prolonged NPO status.     GOAL PN: 135g amino acids, 210g dextrose, 70g SMOF lipid; to provide 1954kcal/day (28.4kcal/kg and 1.96 g/kg protein based on dosing wt 68.8kg)    - Nutritional Assessment, patient not meeting nutritional goals enterally and would benefit from TPN for nutritional support given severe protein calorie malnutrition with prealbumin of 11 mg/dL from 8/12; trend prealbumin weekly   - TPN plan:  increase to goal AA/Dex and add 20 G SMOF;  continue NaCl 40 mEq, Na Acetate 60 mEq, KCl 60 mEq, CaGlu 10 mEq, Mg 12 mEq, TE 1 mL, MVI 10 mL at volume of 2000 mL over 24 hours  - Baseline nutrition parameters reviewed   - TPN access:  PICC with dedicated port in place; maintain per protocol   - Electrolyte Imbalance risk- check CMP, Mg, Phos, iCa daily, will adjust in TPN as needed  - Hypophosphatemia:  increase NaPhos to 60 mM  - Anemia:  Iron studies, B12, folate reviewed; recommend Folate repletion orally once enteral diet resumes  - Risk of glucose dysfunction with TPN: HgA1c 5.8%; finger sticks every 6 hours to monitor glucose trend; no RI in TPN  - Risk for Hypertriglyceridemia with TPN:  baseline TG 74 mg/dL; monitor TG closely on TPN  - Refeeding syndrome:  continue Thiamine 100 mg daily x 5 days; monitor potassium, magnesium, phosphorus, glucose and fluid balance closely  - Continue strict Intake and Output; check weights three times a week;  pg/mL  - Global care per primary team       Available on TEAMS  TPN spectra [id number] ([phone] when dialing from outside line)  M-F 8A-2P, Weekends and holidays 8/9A-12/1P  Discussed with Dr. Mark Buckley

## 2024-08-14 LAB
-  AMOXICILLIN/CLAVULANIC ACID: SIGNIFICANT CHANGE UP
-  AMOXICILLIN/CLAVULANIC ACID: SIGNIFICANT CHANGE UP
-  AMPICILLIN/SULBACTAM: SIGNIFICANT CHANGE UP
-  AMPICILLIN/SULBACTAM: SIGNIFICANT CHANGE UP
-  AMPICILLIN: SIGNIFICANT CHANGE UP
-  AMPICILLIN: SIGNIFICANT CHANGE UP
-  AZTREONAM: SIGNIFICANT CHANGE UP
-  AZTREONAM: SIGNIFICANT CHANGE UP
-  CEFAZOLIN: SIGNIFICANT CHANGE UP
-  CEFAZOLIN: SIGNIFICANT CHANGE UP
-  CEFEPIME: SIGNIFICANT CHANGE UP
-  CEFEPIME: SIGNIFICANT CHANGE UP
-  CEFOXITIN: SIGNIFICANT CHANGE UP
-  CEFOXITIN: SIGNIFICANT CHANGE UP
-  CEFTRIAXONE: SIGNIFICANT CHANGE UP
-  CEFTRIAXONE: SIGNIFICANT CHANGE UP
-  CIPROFLOXACIN: SIGNIFICANT CHANGE UP
-  CIPROFLOXACIN: SIGNIFICANT CHANGE UP
-  ERTAPENEM: SIGNIFICANT CHANGE UP
-  ERTAPENEM: SIGNIFICANT CHANGE UP
-  GENTAMICIN: SIGNIFICANT CHANGE UP
-  GENTAMICIN: SIGNIFICANT CHANGE UP
-  IMIPENEM: SIGNIFICANT CHANGE UP
-  IMIPENEM: SIGNIFICANT CHANGE UP
-  LEVOFLOXACIN: SIGNIFICANT CHANGE UP
-  LEVOFLOXACIN: SIGNIFICANT CHANGE UP
-  MEROPENEM: SIGNIFICANT CHANGE UP
-  MEROPENEM: SIGNIFICANT CHANGE UP
-  PIPERACILLIN/TAZOBACTAM: SIGNIFICANT CHANGE UP
-  PIPERACILLIN/TAZOBACTAM: SIGNIFICANT CHANGE UP
-  TOBRAMYCIN: SIGNIFICANT CHANGE UP
-  TOBRAMYCIN: SIGNIFICANT CHANGE UP
-  TRIMETHOPRIM/SULFAMETHOXAZOLE: SIGNIFICANT CHANGE UP
-  TRIMETHOPRIM/SULFAMETHOXAZOLE: SIGNIFICANT CHANGE UP
ALBUMIN SERPL ELPH-MCNC: 2.9 G/DL — LOW (ref 3.3–5)
ALP SERPL-CCNC: 53 U/L — SIGNIFICANT CHANGE UP (ref 40–120)
ALT FLD-CCNC: 11 U/L — SIGNIFICANT CHANGE UP (ref 10–45)
ANION GAP SERPL CALC-SCNC: 12 MMOL/L — SIGNIFICANT CHANGE UP (ref 5–17)
AST SERPL-CCNC: 24 U/L — SIGNIFICANT CHANGE UP (ref 10–40)
BASOPHILS # BLD AUTO: 0.01 K/UL — SIGNIFICANT CHANGE UP (ref 0–0.2)
BASOPHILS NFR BLD AUTO: 0.2 % — SIGNIFICANT CHANGE UP (ref 0–2)
BILIRUB SERPL-MCNC: 0.5 MG/DL — SIGNIFICANT CHANGE UP (ref 0.2–1.2)
BUN SERPL-MCNC: 20 MG/DL — SIGNIFICANT CHANGE UP (ref 7–23)
CA-I BLD-SCNC: 1.22 MMOL/L — SIGNIFICANT CHANGE UP (ref 1.15–1.33)
CALCIUM SERPL-MCNC: 9.4 MG/DL — SIGNIFICANT CHANGE UP (ref 8.4–10.5)
CHLORIDE SERPL-SCNC: 105 MMOL/L — SIGNIFICANT CHANGE UP (ref 96–108)
CO2 SERPL-SCNC: 23 MMOL/L — SIGNIFICANT CHANGE UP (ref 22–31)
CREAT SERPL-MCNC: 0.93 MG/DL — SIGNIFICANT CHANGE UP (ref 0.5–1.3)
EGFR: 85 ML/MIN/1.73M2 — SIGNIFICANT CHANGE UP
EOSINOPHIL # BLD AUTO: 0.05 K/UL — SIGNIFICANT CHANGE UP (ref 0–0.5)
EOSINOPHIL NFR BLD AUTO: 0.9 % — SIGNIFICANT CHANGE UP (ref 0–6)
GLUCOSE BLDC GLUCOMTR-MCNC: 119 MG/DL — HIGH (ref 70–99)
GLUCOSE BLDC GLUCOMTR-MCNC: 147 MG/DL — HIGH (ref 70–99)
GLUCOSE BLDC GLUCOMTR-MCNC: 150 MG/DL — HIGH (ref 70–99)
GLUCOSE SERPL-MCNC: 101 MG/DL — HIGH (ref 70–99)
HCT VFR BLD CALC: 31.3 % — LOW (ref 39–50)
HGB BLD-MCNC: 10.7 G/DL — LOW (ref 13–17)
IMM GRANULOCYTES NFR BLD AUTO: 0.5 % — SIGNIFICANT CHANGE UP (ref 0–0.9)
LYMPHOCYTES # BLD AUTO: 0.14 K/UL — LOW (ref 1–3.3)
LYMPHOCYTES # BLD AUTO: 2.4 % — LOW (ref 13–44)
MAGNESIUM SERPL-MCNC: 1.8 MG/DL — SIGNIFICANT CHANGE UP (ref 1.6–2.6)
MCHC RBC-ENTMCNC: 31.2 PG — SIGNIFICANT CHANGE UP (ref 27–34)
MCHC RBC-ENTMCNC: 34.2 GM/DL — SIGNIFICANT CHANGE UP (ref 32–36)
MCV RBC AUTO: 91.3 FL — SIGNIFICANT CHANGE UP (ref 80–100)
METHOD TYPE: SIGNIFICANT CHANGE UP
METHOD TYPE: SIGNIFICANT CHANGE UP
MONOCYTES # BLD AUTO: 0.21 K/UL — SIGNIFICANT CHANGE UP (ref 0–0.9)
MONOCYTES NFR BLD AUTO: 3.6 % — SIGNIFICANT CHANGE UP (ref 2–14)
NEUTROPHILS # BLD AUTO: 5.4 K/UL — SIGNIFICANT CHANGE UP (ref 1.8–7.4)
NEUTROPHILS NFR BLD AUTO: 92.4 % — HIGH (ref 43–77)
NRBC # BLD: 0 /100 WBCS — SIGNIFICANT CHANGE UP (ref 0–0)
PHOSPHATE SERPL-MCNC: 3.2 MG/DL — SIGNIFICANT CHANGE UP (ref 2.5–4.5)
PLATELET # BLD AUTO: 154 K/UL — SIGNIFICANT CHANGE UP (ref 150–400)
POTASSIUM SERPL-MCNC: 3.8 MMOL/L — SIGNIFICANT CHANGE UP (ref 3.5–5.3)
POTASSIUM SERPL-SCNC: 3.8 MMOL/L — SIGNIFICANT CHANGE UP (ref 3.5–5.3)
PROT SERPL-MCNC: 5.8 G/DL — LOW (ref 6–8.3)
RBC # BLD: 3.43 M/UL — LOW (ref 4.2–5.8)
RBC # FLD: 15.5 % — HIGH (ref 10.3–14.5)
SODIUM SERPL-SCNC: 140 MMOL/L — SIGNIFICANT CHANGE UP (ref 135–145)
TRIGL SERPL-MCNC: 137 MG/DL — SIGNIFICANT CHANGE UP
WBC # BLD: 5.84 K/UL — SIGNIFICANT CHANGE UP (ref 3.8–10.5)
WBC # FLD AUTO: 5.84 K/UL — SIGNIFICANT CHANGE UP (ref 3.8–10.5)

## 2024-08-14 PROCEDURE — 99233 SBSQ HOSP IP/OBS HIGH 50: CPT

## 2024-08-14 RX ORDER — I.V. FAT EMULSION 20 G/100ML
29.2 EMULSION INTRAVENOUS
Qty: 70 | Refills: 0 | Status: DISCONTINUED | OUTPATIENT
Start: 2024-08-14 | End: 2024-08-15

## 2024-08-14 RX ORDER — ELECTROLYTE SOLUTION,INJ
1 VIAL (ML) INTRAVENOUS
Refills: 0 | Status: DISCONTINUED | OUTPATIENT
Start: 2024-08-14 | End: 2024-08-14

## 2024-08-14 RX ADMIN — HYDROMORPHONE HYDROCHLORIDE 0.5 MILLIGRAM(S): 2 TABLET ORAL at 14:40

## 2024-08-14 RX ADMIN — ENOXAPARIN SODIUM 40 MILLIGRAM(S): 100 INJECTION SUBCUTANEOUS at 05:22

## 2024-08-14 RX ADMIN — FLUCONAZOLE 100 MILLIGRAM(S): 150 TABLET ORAL at 18:22

## 2024-08-14 RX ADMIN — Medication 1 EACH: at 19:38

## 2024-08-14 RX ADMIN — PIPERACILLIN SODIUM AND TAZOBACTAM SODIUM 25 GRAM(S): 3; .375 INJECTION, POWDER, FOR SOLUTION INTRAVENOUS at 05:21

## 2024-08-14 RX ADMIN — I.V. FAT EMULSION 29.2 ML/HR: 20 EMULSION INTRAVENOUS at 18:35

## 2024-08-14 RX ADMIN — I.V. FAT EMULSION 29.2 ML/HR: 20 EMULSION INTRAVENOUS at 19:38

## 2024-08-14 RX ADMIN — Medication 1 EACH: at 07:09

## 2024-08-14 RX ADMIN — HYDROMORPHONE HYDROCHLORIDE 0.5 MILLIGRAM(S): 2 TABLET ORAL at 04:32

## 2024-08-14 RX ADMIN — CHLORHEXIDINE GLUCONATE 1 APPLICATION(S): 40 SOLUTION TOPICAL at 07:09

## 2024-08-14 RX ADMIN — PIPERACILLIN SODIUM AND TAZOBACTAM SODIUM 25 GRAM(S): 3; .375 INJECTION, POWDER, FOR SOLUTION INTRAVENOUS at 13:34

## 2024-08-14 RX ADMIN — PIPERACILLIN SODIUM AND TAZOBACTAM SODIUM 25 GRAM(S): 3; .375 INJECTION, POWDER, FOR SOLUTION INTRAVENOUS at 21:47

## 2024-08-14 RX ADMIN — HYDROMORPHONE HYDROCHLORIDE 0.5 MILLIGRAM(S): 2 TABLET ORAL at 15:10

## 2024-08-14 RX ADMIN — HYDROMORPHONE HYDROCHLORIDE 0.5 MILLIGRAM(S): 2 TABLET ORAL at 19:44

## 2024-08-14 RX ADMIN — ACETAMINOPHEN 1000 MILLIGRAM(S): 325 TABLET ORAL at 12:32

## 2024-08-14 RX ADMIN — Medication 40 MILLIGRAM(S): at 05:22

## 2024-08-14 RX ADMIN — ACETAMINOPHEN 400 MILLIGRAM(S): 325 TABLET ORAL at 12:02

## 2024-08-14 RX ADMIN — Medication 1 EACH: at 18:34

## 2024-08-14 RX ADMIN — ACETAMINOPHEN 400 MILLIGRAM(S): 325 TABLET ORAL at 05:22

## 2024-08-14 RX ADMIN — ACETAMINOPHEN 1000 MILLIGRAM(S): 325 TABLET ORAL at 05:52

## 2024-08-14 RX ADMIN — HYDROMORPHONE HYDROCHLORIDE 0.5 MILLIGRAM(S): 2 TABLET ORAL at 20:14

## 2024-08-14 RX ADMIN — HYDROMORPHONE HYDROCHLORIDE 0.5 MILLIGRAM(S): 2 TABLET ORAL at 05:02

## 2024-08-14 RX ADMIN — Medication 40 MILLIGRAM(S): at 18:22

## 2024-08-14 NOTE — PROGRESS NOTE ADULT - ASSESSMENT
79 year old man with a history of locally advanced rectal adenocarcinoma status post neoadjuvant chemoradiotherapy and abdominoperineal resection (Dr. 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   Patient planned for NPO x 5 days followed by UGI series to check for leak prior to considering enteral diet.  TPN team consulted given anticipated prolonged NPO status.     GOAL PN: 135g amino acids, 210g dextrose, 70g SMOF lipid; to provide 1954kcal/day (28.4kcal/kg and 1.96 g/kg protein based on dosing wt 68.8kg)    - Nutritional Assessment, patient not meeting nutritional goals enterally and would benefit from TPN for nutritional support given severe protein calorie malnutrition with prealbumin of 11 mg/dL from 8/12; trend prealbumin weekly   - TPN plan: continue goal AA/Dex and increase to goal SMOF;  continue NaCl 40 mEq, Na Acetate 60 mEq, CaGlu 10 mEq, Mg 12 mEq, TE 1 mL, MVI 10 mL over 24 hours  - TPN access:  PICC with dedicated port in place; maintain per protocol   - Electrolyte Imbalance risk- check CMP, Mg, Phos, iCa daily, will adjust in TPN as needed  - Hypophosphatemia:  continue NaPhos to 60 mM  - Hypokalemia:  increase KCl 80 mEq  - Uptrend in sodium:  increase free water to 2400 mL  - Anemia:  Iron studies, B12, folate reviewed; recommend Folate repletion orally once enteral diet resumes  - Risk of glucose dysfunction with TPN: HgA1c 5.8%; finger sticks every 6 hours to monitor glucose trend; no RI in TPN  - Risk for Hypertriglyceridemia with TPN:  baseline TG 74 mg/dL --> 137 mg/dL from 8/14; monitor TG closely on TPN  - Refeeding syndrome:  continue Thiamine 100 mg daily x 5 days; monitor potassium, magnesium, phosphorus, glucose and fluid balance closely  - Continue strict Intake and Output; check weights three times a week;  pg/mL  - Global care per primary team       Available on TEAMS  TPN spectra [id number] ([phone] when dialing from outside line)  M-F 8A-2P, Weekends and holidays 8/9A-12/1P  Discussed with Dr. Mark Buckley

## 2024-08-14 NOTE — PROGRESS NOTE ADULT - SUBJECTIVE AND OBJECTIVE BOX
[facility] NUTRITION SUPPORT-- FOLLOW UP NOTE      24 hour events/subjective:  Patient seen and examined at bedside, chart reviewed and events noted and I's and O's reviewed.  Patient denies chest pain, shortness of breath, nausea or vomiting, dizziness, chills at time of visit.  Patient's VSS and no other acute overnight events noted.   Patient continues with NGT decompression, remains NPO.  Patient's VSS except bradycardia (cardio following) and no other acute overnight events noted.   Patient tolerating TPN and glucose trend is within appropriate range.      ROS:  Except as noted above, all other systems reviewed and are negative     Diet:  Diet, NPO (08-11-24 @ 19:31)      PAST HISTORY  --------------------------------------------------------------------------------  PAST MEDICAL & SURGICAL HISTORY:  Rectal Neoplasm  treated with oral chemothearpy and radiation 5/10- 6/10        Hemorrhoid      Malignant neoplasm of oropharynx      Colostomy status      Neck mass      S/P Colonoscopy  3/10      Rectal cancer      S/P colostomy      History of cancer chemotherapy      S/P radiation therapy        No significant changes to PMH, PSH, FHx, SHx, unless otherwise noted    ALLERGIES & MEDICATIONS  --------------------------------------------------------------------------------  Allergies    No Known Allergies    Intolerances      Standing Inpatient Medications  chlorhexidine 2% Cloths 1 Application(s) Topical <User Schedule>  enoxaparin Injectable 40 milliGRAM(s) SubCutaneous every 24 hours  fluconAZOLE IVPB 400 milliGRAM(s) IV Intermittent every 24 hours  fluconAZOLE IVPB      insulin lispro (ADMELOG) corrective regimen sliding scale   SubCutaneous every 6 hours  lipid, fat emulsion (Fish Oil and Plant Based) 20% Infusion 29.2 mL/Hr IV Continuous <Continuous>  pantoprazole  Injectable 40 milliGRAM(s) IV Push two times a day  Parenteral Nutrition - Adult 1 Each TPN Continuous <Continuous>  Parenteral Nutrition - Adult 1 Each TPN Continuous <Continuous>  piperacillin/tazobactam IVPB.. 3.375 Gram(s) IV Intermittent every 8 hours  sodium phosphate 30 milliMole(s)/500 mL IVPB 30 milliMole(s) IV Intermittent once    PRN Inpatient Medications  benzocaine 20% Spray 1 Spray(s) Topical every 6 hours PRN  HYDROmorphone  Injectable 0.5 milliGRAM(s) IV Push every 3 hours PRN  HYDROmorphone  Injectable 0.25 milliGRAM(s) IV Push every 3 hours PRN        VITALS/PHYSICAL EXAM  --------------------------------------------------------------------------------  T(C): 36.9 (08-14-24 @ 12:23), Max: 37.6 (08-13-24 @ 17:05)  HR: 64 (08-14-24 @ 12:23) (63 - 69)  BP: 139/64 (08-14-24 @ 12:23) (133/76 - 159/89)  RR: 18 (08-14-24 @ 12:23) (18 - 18)  SpO2: 96% (08-14-24 @ 12:23) (95% - 97%)  Wt(kg): --      08-13-24 @ 07:01  -  08-14-24 @ 07:00  --------------------------------------------------------  IN: 1186.9 mL / OUT: 4675 mL / NET: -3488.1 mL    08-14-24 @ 07:01  -  08-14-24 @ 13:08  --------------------------------------------------------  IN: 0 mL / OUT: 430 mL / NET: -430 mL      I&O's Detail    13 Aug 2024 07:01  -  14 Aug 2024 07:00  --------------------------------------------------------  IN:    Fat Emulsion (Fish Oil &amp; Plant Based) 20% Infusion: 107.9 mL    TPN (Total Parenteral Nutrition): 1079 mL  Total IN: 1186.9 mL    OUT:    Bulb (mL): 60 mL    Bulb (mL): 65 mL    Colostomy (mL): 0 mL    Indwelling Catheter - Urethral (mL): 4250 mL    Nasogastric/Oral tube (mL): 300 mL    Oral Fluid: 0 mL  Total OUT: 4675 mL    Total NET: -3488.1 mL      14 Aug 2024 07:01  -  14 Aug 2024 13:08  --------------------------------------------------------  IN:  Total IN: 0 mL    OUT:    Bulb (mL): 10 mL    Bulb (mL): 10 mL    Colostomy (mL): 0 mL    Indwelling Catheter - Urethral (mL): 300 mL    Voided (mL): 110 mL  Total OUT: 430 mL    Total NET: -430 mL          Physical Exam:  Gen: NAD, frail appearing  HEENT: supple neck, no JVD; +NGT; Neck discoloration/darkening  Chest: non labored breathing, equal chest expansion bilaterally  Abd:  Soft, nontender, prevena vac in place, 2 left BELL drains SS, appropriate incisional tenderness, dressings c/d/i  : No suprapubic tenderness  Ext: Warm, FROM, no edema b/l LE  Neuro: No focal deficits  Psych: Normal affect and mood  Skin: Warm, without rashes         LABS/STUDIES  --------------------------------------------------------------------------------              10.7   5.84  >-----------<  154      [08-14-24 @ 06:39]              31.3     140  |  105  |  20  ----------------------------<  101      [08-14-24 @ 06:39]  3.8   |  23  |  0.93        Ca     9.4     [08-14-24 @ 06:39]      iCa    1.22     [08-14 @ 06:39]      Mg     1.8     [08-14-24 @ 06:39]      Phos  3.2     [08-14-24 @ 06:39]    TPro  5.8  /  Alb  2.9  /  TBili  0.5  /  DBili  x   /  AST  24  /  ALT  11  /  AlkPhos  53  [08-14-24 @ 06:39]          Ca ionized  Creatinine Trend:  POC glucoseGlucose: 101 mg/dL (08-14-24 @ 06:39)  CAPILLARY BLOOD GLUCOSE      POCT Blood Glucose.: 147 mg/dL (14 Aug 2024 12:43)  POCT Blood Glucose.: 150 mg/dL (14 Aug 2024 06:09)  POCT Blood Glucose.: 124 mg/dL (13 Aug 2024 23:56)  POCT Blood Glucose.: 115 mg/dL (13 Aug 2024 17:15)    PrealbuminPrealbumin, Serum: 11 mg/dL (08-12-24 @ 16:28)    Triglycerides

## 2024-08-14 NOTE — PROGRESS NOTE ADULT - ASSESSMENT
Mr. Fulton is a 79 year old man with a history of locally advanced rectal adenocarcinoma status post neoadjuvant chemoradiotherapy and abdominoperineal resection (Dr. 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    Plan:  - NPO/NGT  - TPN  - PPI BID for ulcer ppx  - NPO for 5 days (8/16) --> upper G series to r/o leak  - pain control  - OOB/amb  - [place] [facility] [place], pager#[phone]

## 2024-08-14 NOTE — PROGRESS NOTE ADULT - SUBJECTIVE AND OBJECTIVE BOX
Subjective: Patient seen and examined. No new events except as noted.     SUBJECTIVE/ROS:  nad    MEDICATIONS:  MEDICATIONS  (STANDING):  acetaminophen   IVPB .. 1000 milliGRAM(s) IV Intermittent every 6 hours  chlorhexidine 2% Cloths 1 Application(s) Topical <User Schedule>  enoxaparin Injectable 40 milliGRAM(s) SubCutaneous every 24 hours  fluconAZOLE IVPB      fluconAZOLE IVPB 400 milliGRAM(s) IV Intermittent every 24 hours  insulin lispro (ADMELOG) corrective regimen sliding scale   SubCutaneous every 6 hours  lipid, fat emulsion (Fish Oil and Plant Based) 20% Infusion 8.3 mL/Hr (8.3 mL/Hr) IV Continuous <Continuous>  pantoprazole  Injectable 40 milliGRAM(s) IV Push two times a day  Parenteral Nutrition - Adult 1 Each (83 mL/Hr) TPN Continuous <Continuous>  piperacillin/tazobactam IVPB.. 3.375 Gram(s) IV Intermittent every 8 hours  sodium phosphate 30 milliMole(s)/500 mL IVPB 30 milliMole(s) IV Intermittent once      PHYSICAL EXAM:  T(C): 37.1 (08-14-24 @ 04:55), Max: 37.6 (08-13-24 @ 17:05)  HR: 69 (08-14-24 @ 04:55) (62 - 69)  BP: 135/75 (08-14-24 @ 04:55) (133/76 - 159/89)  RR: 18 (08-14-24 @ 04:55) (18 - 18)  SpO2: 96% (08-14-24 @ 04:55) (95% - 97%)  Wt(kg): --  I&O's Summary    13 Aug 2024 07:01  -  14 Aug 2024 07:00  --------------------------------------------------------  IN: 1186.9 mL / OUT: 4675 mL / NET: -3488.1 mL            JVP: Normal  Neck: supple  Lung: clear   CV: S1 S2 , Murmur:  Abd: soft  Ext: No edema  neuro: Awake / alert  Psych: flat affect  Skin: normal``    LABS/DATA:    CARDIAC MARKERS:                                10.7   5.84  )-----------( 154      ( 14 Aug 2024 06:39 )             31.3     08-14    140  |  105  |  20  ----------------------------<  101<H>  3.8   |  23  |  0.93    Ca    9.4      14 Aug 2024 06:39  Phos  3.2     08-14  Mg     1.8     08-14    TPro  5.8<L>  /  Alb  2.9<L>  /  TBili  0.5  /  DBili  x   /  AST  24  /  ALT  11  /  AlkPhos  53  08-14    proBNP:   Lipid Profile:   HgA1c:   TSH:     TELE:  EKG:

## 2024-08-14 NOTE — PROGRESS NOTE ADULT - SUBJECTIVE AND OBJECTIVE BOX
ACS Surgery Progress Note     Subjective/24hour Events: No acute events overnight.    MEDICATIONS  (STANDING):  acetaminophen   IVPB .. 1000 milliGRAM(s) IV Intermittent every 6 hours  chlorhexidine 2% Cloths 1 Application(s) Topical <User Schedule>  enoxaparin Injectable 40 milliGRAM(s) SubCutaneous every 24 hours  fluconAZOLE IVPB 400 milliGRAM(s) IV Intermittent every 24 hours  fluconAZOLE IVPB      insulin lispro (ADMELOG) corrective regimen sliding scale   SubCutaneous every 6 hours  lipid, fat emulsion (Fish Oil and Plant Based) 20% Infusion 8.3 mL/Hr (8.3 mL/Hr) IV Continuous <Continuous>  pantoprazole  Injectable 40 milliGRAM(s) IV Push two times a day  Parenteral Nutrition - Adult 1 Each (83 mL/Hr) TPN Continuous <Continuous>  piperacillin/tazobactam IVPB.. 3.375 Gram(s) IV Intermittent every 8 hours  sodium phosphate 30 milliMole(s)/500 mL IVPB 30 milliMole(s) IV Intermittent once    MEDICATIONS  (PRN):  benzocaine 20% Spray 1 Spray(s) Topical every 6 hours PRN Sore throat  HYDROmorphone  Injectable 0.5 milliGRAM(s) IV Push every 3 hours PRN Severe Pain (7 - 10)  HYDROmorphone  Injectable 0.25 milliGRAM(s) IV Push every 3 hours PRN Moderate Pain (4 - 6)      Vital Signs:  Vital Signs Last 24 Hrs  T(C): 37.1 (14 Aug 2024 08:33), Max: 37.6 (13 Aug 2024 17:05)  T(F): 98.8 (14 Aug 2024 08:33), Max: 99.6 (13 Aug 2024 17:05)  HR: 65 (14 Aug 2024 08:33) (62 - 69)  BP: 142/81 (14 Aug 2024 08:33) (133/76 - 159/89)  BP(mean): --  RR: 18 (14 Aug 2024 08:33) (18 - 18)  SpO2: 96% (14 Aug 2024 08:33) (95% - 97%)    Parameters below as of 14 Aug 2024 08:33  Patient On (Oxygen Delivery Method): room air    Physical Exam:  General Appearance: Appears well, NAD  Neck: Supple  Chest: Equal expansion bilaterally, equal breath sounds  CV: Pulse regular presently  Abdomen: Soft, nontender, prevena vac in place, 2 left BELL drains SS,  appropriate incisional tenderness, dressings clean and dry and intact  Extremities: Grossly symmetric, SCD's in place     CAPILLARY BLOOD GLUCOSE      POCT Blood Glucose.: 150 mg/dL (14 Aug 2024 06:09)  POCT Blood Glucose.: 124 mg/dL (13 Aug 2024 23:56)  POCT Blood Glucose.: 115 mg/dL (13 Aug 2024 17:15)  POCT Blood Glucose.: 123 mg/dL (13 Aug 2024 11:55)      I&O's Detail    13 Aug 2024 07:01  -  14 Aug 2024 07:00  --------------------------------------------------------  IN:    Fat Emulsion (Fish Oil &amp; Plant Based) 20% Infusion: 107.9 mL    TPN (Total Parenteral Nutrition): 1079 mL  Total IN: 1186.9 mL    OUT:    Bulb (mL): 60 mL    Bulb (mL): 65 mL    Colostomy (mL): 0 mL    Indwelling Catheter - Urethral (mL): 4250 mL    Nasogastric/Oral tube (mL): 300 mL    Oral Fluid: 0 mL  Total OUT: 4675 mL    Total NET: -3488.1 mL      14 Aug 2024 07:01  -  14 Aug 2024 11:07  --------------------------------------------------------  IN:  Total IN: 0 mL    OUT:    Bulb (mL): 5 mL    Bulb (mL): 5 mL    Colostomy (mL): 0 mL    Indwelling Catheter - Urethral (mL): 300 mL  Total OUT: 310 mL    Total NET: -310 mL          Labs:    08-14    140  |  105  |  20  ----------------------------<  101<H>  3.8   |  23  |  0.93    Ca    9.4      14 Aug 2024 06:39  Phos  3.2     08-14  Mg     1.8     08-14    TPro  5.8<L>  /  Alb  2.9<L>  /  TBili  0.5  /  DBili  x   /  AST  24  /  ALT  11  /  AlkPhos  53  08-14    LIVER FUNCTIONS - ( 14 Aug 2024 06:39 )  Alb: 2.9 g/dL / Pro: 5.8 g/dL / ALK PHOS: 53 U/L / ALT: 11 U/L / AST: 24 U/L / GGT: x                                 10.7   5.84  )-----------( 154      ( 14 Aug 2024 06:39 )             31.3         Imaging:                       ACS Surgery Progress Note     Subjective/24hour Events: No acute events overnight.    MEDICATIONS  (STANDING):  acetaminophen   IVPB .. 1000 milliGRAM(s) IV Intermittent every 6 hours  chlorhexidine 2% Cloths 1 Application(s) Topical <User Schedule>  enoxaparin Injectable 40 milliGRAM(s) SubCutaneous every 24 hours  fluconAZOLE IVPB 400 milliGRAM(s) IV Intermittent every 24 hours  fluconAZOLE IVPB      insulin lispro (ADMELOG) corrective regimen sliding scale   SubCutaneous every 6 hours  lipid, fat emulsion (Fish Oil and Plant Based) 20% Infusion 8.3 mL/Hr (8.3 mL/Hr) IV Continuous <Continuous>  pantoprazole  Injectable 40 milliGRAM(s) IV Push two times a day  Parenteral Nutrition - Adult 1 Each (83 mL/Hr) TPN Continuous <Continuous>  piperacillin/tazobactam IVPB.. 3.375 Gram(s) IV Intermittent every 8 hours  sodium phosphate 30 milliMole(s)/500 mL IVPB 30 milliMole(s) IV Intermittent once    MEDICATIONS  (PRN):  benzocaine 20% Spray 1 Spray(s) Topical every 6 hours PRN Sore throat  HYDROmorphone  Injectable 0.5 milliGRAM(s) IV Push every 3 hours PRN Severe Pain (7 - 10)  HYDROmorphone  Injectable 0.25 milliGRAM(s) IV Push every 3 hours PRN Moderate Pain (4 - 6)      Vital Signs:  Vital Signs Last 24 Hrs  T(C): 37.1 (14 Aug 2024 08:33), Max: 37.6 (13 Aug 2024 17:05)  T(F): 98.8 (14 Aug 2024 08:33), Max: 99.6 (13 Aug 2024 17:05)  HR: 65 (14 Aug 2024 08:33) (62 - 69)  BP: 142/81 (14 Aug 2024 08:33) (133/76 - 159/89)  BP(mean): --  RR: 18 (14 Aug 2024 08:33) (18 - 18)  SpO2: 96% (14 Aug 2024 08:33) (95% - 97%)    Parameters below as of 14 Aug 2024 08:33  Patient On (Oxygen Delivery Method): room air    Physical Exam:  General Appearance: Appears well, NAD  Neck: Supple  Chest: Equal expansion bilaterally, equal breath sounds  CV: Pulse regular presently  Abdomen: Soft, nontender, prevena vac in place, 2 left BELL drains SS,  appropriate incisional tenderness, dressings clean and dry and intact  Extremities: Grossly symmetric, SCD's in place       CAPILLARY BLOOD GLUCOSE      POCT Blood Glucose.: 150 mg/dL (14 Aug 2024 06:09)  POCT Blood Glucose.: 124 mg/dL (13 Aug 2024 23:56)  POCT Blood Glucose.: 115 mg/dL (13 Aug 2024 17:15)  POCT Blood Glucose.: 123 mg/dL (13 Aug 2024 11:55)      I&O's Detail    13 Aug 2024 07:01  -  14 Aug 2024 07:00  --------------------------------------------------------  IN:    Fat Emulsion (Fish Oil &amp; Plant Based) 20% Infusion: 107.9 mL    TPN (Total Parenteral Nutrition): 1079 mL  Total IN: 1186.9 mL    OUT:    Bulb (mL): 60 mL    Bulb (mL): 65 mL    Colostomy (mL): 0 mL    Indwelling Catheter - Urethral (mL): 4250 mL    Nasogastric/Oral tube (mL): 300 mL    Oral Fluid: 0 mL  Total OUT: 4675 mL    Total NET: -3488.1 mL      14 Aug 2024 07:01  -  14 Aug 2024 11:07  --------------------------------------------------------  IN:  Total IN: 0 mL    OUT:    Bulb (mL): 5 mL    Bulb (mL): 5 mL    Colostomy (mL): 0 mL    Indwelling Catheter - Urethral (mL): 300 mL  Total OUT: 310 mL    Total NET: -310 mL          Labs:    08-14    140  |  105  |  20  ----------------------------<  101<H>  3.8   |  23  |  0.93    Ca    9.4      14 Aug 2024 06:39  Phos  3.2     08-14  Mg     1.8     08-14    TPro  5.8<L>  /  Alb  2.9<L>  /  TBili  0.5  /  DBili  x   /  AST  24  /  ALT  11  /  AlkPhos  53  08-14    LIVER FUNCTIONS - ( 14 Aug 2024 06:39 )  Alb: 2.9 g/dL / Pro: 5.8 g/dL / ALK PHOS: 53 U/L / ALT: 11 U/L / AST: 24 U/L / GGT: x                                 10.7   5.84  )-----------( 154      ( 14 Aug 2024 06:39 )             31.3         Imaging:

## 2024-08-15 ENCOUNTER — NON-APPOINTMENT (OUTPATIENT)
Age: 79
End: 2024-08-15

## 2024-08-15 LAB
ALBUMIN SERPL ELPH-MCNC: 3.1 G/DL — LOW (ref 3.3–5)
ALP SERPL-CCNC: 64 U/L — SIGNIFICANT CHANGE UP (ref 40–120)
ALT FLD-CCNC: 12 U/L — SIGNIFICANT CHANGE UP (ref 10–45)
ANION GAP SERPL CALC-SCNC: 13 MMOL/L — SIGNIFICANT CHANGE UP (ref 5–17)
AST SERPL-CCNC: 31 U/L — SIGNIFICANT CHANGE UP (ref 10–40)
BASOPHILS # BLD AUTO: 0.01 K/UL — SIGNIFICANT CHANGE UP (ref 0–0.2)
BASOPHILS NFR BLD AUTO: 0.2 % — SIGNIFICANT CHANGE UP (ref 0–2)
BILIRUB SERPL-MCNC: 0.6 MG/DL — SIGNIFICANT CHANGE UP (ref 0.2–1.2)
BUN SERPL-MCNC: 26 MG/DL — HIGH (ref 7–23)
CA-I BLD-SCNC: 1.22 MMOL/L — SIGNIFICANT CHANGE UP (ref 1.15–1.33)
CALCIUM SERPL-MCNC: 9.1 MG/DL — SIGNIFICANT CHANGE UP (ref 8.4–10.5)
CHLORIDE SERPL-SCNC: 101 MMOL/L — SIGNIFICANT CHANGE UP (ref 96–108)
CO2 SERPL-SCNC: 23 MMOL/L — SIGNIFICANT CHANGE UP (ref 22–31)
CREAT SERPL-MCNC: 0.86 MG/DL — SIGNIFICANT CHANGE UP (ref 0.5–1.3)
EGFR: 89 ML/MIN/1.73M2 — SIGNIFICANT CHANGE UP
EOSINOPHIL # BLD AUTO: 0.04 K/UL — SIGNIFICANT CHANGE UP (ref 0–0.5)
EOSINOPHIL NFR BLD AUTO: 0.8 % — SIGNIFICANT CHANGE UP (ref 0–6)
GLUCOSE BLDC GLUCOMTR-MCNC: 115 MG/DL — HIGH (ref 70–99)
GLUCOSE BLDC GLUCOMTR-MCNC: 123 MG/DL — HIGH (ref 70–99)
GLUCOSE BLDC GLUCOMTR-MCNC: 128 MG/DL — HIGH (ref 70–99)
GLUCOSE BLDC GLUCOMTR-MCNC: 136 MG/DL — HIGH (ref 70–99)
GLUCOSE BLDC GLUCOMTR-MCNC: 140 MG/DL — HIGH (ref 70–99)
GLUCOSE SERPL-MCNC: 104 MG/DL — HIGH (ref 70–99)
HCT VFR BLD CALC: 32.9 % — LOW (ref 39–50)
HGB BLD-MCNC: 11 G/DL — LOW (ref 13–17)
IMM GRANULOCYTES NFR BLD AUTO: 1 % — HIGH (ref 0–0.9)
LYMPHOCYTES # BLD AUTO: 0.13 K/UL — LOW (ref 1–3.3)
LYMPHOCYTES # BLD AUTO: 2.7 % — LOW (ref 13–44)
MAGNESIUM SERPL-MCNC: 1.8 MG/DL — SIGNIFICANT CHANGE UP (ref 1.6–2.6)
MCHC RBC-ENTMCNC: 30.6 PG — SIGNIFICANT CHANGE UP (ref 27–34)
MCHC RBC-ENTMCNC: 33.4 GM/DL — SIGNIFICANT CHANGE UP (ref 32–36)
MCV RBC AUTO: 91.6 FL — SIGNIFICANT CHANGE UP (ref 80–100)
MONOCYTES # BLD AUTO: 0.19 K/UL — SIGNIFICANT CHANGE UP (ref 0–0.9)
MONOCYTES NFR BLD AUTO: 4 % — SIGNIFICANT CHANGE UP (ref 2–14)
NEUTROPHILS # BLD AUTO: 4.39 K/UL — SIGNIFICANT CHANGE UP (ref 1.8–7.4)
NEUTROPHILS NFR BLD AUTO: 91.3 % — HIGH (ref 43–77)
NRBC # BLD: 0 /100 WBCS — SIGNIFICANT CHANGE UP (ref 0–0)
PHOSPHATE SERPL-MCNC: 3.4 MG/DL — SIGNIFICANT CHANGE UP (ref 2.5–4.5)
PLATELET # BLD AUTO: 144 K/UL — LOW (ref 150–400)
POTASSIUM SERPL-MCNC: 3.8 MMOL/L — SIGNIFICANT CHANGE UP (ref 3.5–5.3)
POTASSIUM SERPL-SCNC: 3.8 MMOL/L — SIGNIFICANT CHANGE UP (ref 3.5–5.3)
PROT SERPL-MCNC: 6.2 G/DL — SIGNIFICANT CHANGE UP (ref 6–8.3)
RBC # BLD: 3.59 M/UL — LOW (ref 4.2–5.8)
RBC # FLD: 15.4 % — HIGH (ref 10.3–14.5)
SODIUM SERPL-SCNC: 137 MMOL/L — SIGNIFICANT CHANGE UP (ref 135–145)
TRIGL SERPL-MCNC: 179 MG/DL — HIGH
WBC # BLD: 4.81 K/UL — SIGNIFICANT CHANGE UP (ref 3.8–10.5)
WBC # FLD AUTO: 4.81 K/UL — SIGNIFICANT CHANGE UP (ref 3.8–10.5)

## 2024-08-15 PROCEDURE — 99233 SBSQ HOSP IP/OBS HIGH 50: CPT

## 2024-08-15 PROCEDURE — 71045 X-RAY EXAM CHEST 1 VIEW: CPT | Mod: 26

## 2024-08-15 RX ORDER — ELECTROLYTE SOLUTION,INJ
1 VIAL (ML) INTRAVENOUS
Refills: 0 | Status: DISCONTINUED | OUTPATIENT
Start: 2024-08-15 | End: 2024-08-16

## 2024-08-15 RX ORDER — I.V. FAT EMULSION 20 G/100ML
29.2 EMULSION INTRAVENOUS
Qty: 70 | Refills: 0 | Status: DISCONTINUED | OUTPATIENT
Start: 2024-08-15 | End: 2024-08-16

## 2024-08-15 RX ADMIN — PIPERACILLIN SODIUM AND TAZOBACTAM SODIUM 25 GRAM(S): 3; .375 INJECTION, POWDER, FOR SOLUTION INTRAVENOUS at 14:17

## 2024-08-15 RX ADMIN — HYDROMORPHONE HYDROCHLORIDE 0.5 MILLIGRAM(S): 2 TABLET ORAL at 14:17

## 2024-08-15 RX ADMIN — ENOXAPARIN SODIUM 40 MILLIGRAM(S): 100 INJECTION SUBCUTANEOUS at 05:06

## 2024-08-15 RX ADMIN — HYDROMORPHONE HYDROCHLORIDE 0.5 MILLIGRAM(S): 2 TABLET ORAL at 18:15

## 2024-08-15 RX ADMIN — HYDROMORPHONE HYDROCHLORIDE 0.5 MILLIGRAM(S): 2 TABLET ORAL at 04:58

## 2024-08-15 RX ADMIN — Medication 40 MILLIGRAM(S): at 05:05

## 2024-08-15 RX ADMIN — CHLORHEXIDINE GLUCONATE 1 APPLICATION(S): 40 SOLUTION TOPICAL at 07:46

## 2024-08-15 RX ADMIN — I.V. FAT EMULSION 29.2 ML/HR: 20 EMULSION INTRAVENOUS at 17:41

## 2024-08-15 RX ADMIN — HYDROMORPHONE HYDROCHLORIDE 0.5 MILLIGRAM(S): 2 TABLET ORAL at 17:37

## 2024-08-15 RX ADMIN — FLUCONAZOLE 100 MILLIGRAM(S): 150 TABLET ORAL at 18:24

## 2024-08-15 RX ADMIN — HYDROMORPHONE HYDROCHLORIDE 0.5 MILLIGRAM(S): 2 TABLET ORAL at 12:06

## 2024-08-15 RX ADMIN — HYDROMORPHONE HYDROCHLORIDE 0.5 MILLIGRAM(S): 2 TABLET ORAL at 04:28

## 2024-08-15 RX ADMIN — Medication 1 EACH: at 19:17

## 2024-08-15 RX ADMIN — I.V. FAT EMULSION 29.2 ML/HR: 20 EMULSION INTRAVENOUS at 19:18

## 2024-08-15 RX ADMIN — HYDROMORPHONE HYDROCHLORIDE 0.5 MILLIGRAM(S): 2 TABLET ORAL at 15:17

## 2024-08-15 RX ADMIN — HYDROMORPHONE HYDROCHLORIDE 0.5 MILLIGRAM(S): 2 TABLET ORAL at 11:06

## 2024-08-15 RX ADMIN — PIPERACILLIN SODIUM AND TAZOBACTAM SODIUM 25 GRAM(S): 3; .375 INJECTION, POWDER, FOR SOLUTION INTRAVENOUS at 22:19

## 2024-08-15 RX ADMIN — Medication 40 MILLIGRAM(S): at 17:36

## 2024-08-15 RX ADMIN — PIPERACILLIN SODIUM AND TAZOBACTAM SODIUM 25 GRAM(S): 3; .375 INJECTION, POWDER, FOR SOLUTION INTRAVENOUS at 05:05

## 2024-08-15 RX ADMIN — HYDROMORPHONE HYDROCHLORIDE 0.5 MILLIGRAM(S): 2 TABLET ORAL at 08:46

## 2024-08-15 RX ADMIN — Medication 1 EACH: at 17:41

## 2024-08-15 RX ADMIN — HYDROMORPHONE HYDROCHLORIDE 0.5 MILLIGRAM(S): 2 TABLET ORAL at 07:46

## 2024-08-15 NOTE — CHART NOTE - NSCHARTNOTEFT_GEN_A_CORE
NUTRITION FOLLOW UP NOTE    PATIENT SEEN FOR: Nutrition Follow Up    SOURCE: [] Patient  [x] Current Medical Record  [x] RN  [] Family/support person at bedside  [] Patient unavailable/inappropriate  [x] Other: interdisciplinary medical team    CHART REVIEWED/EVENTS NOTED.  [] No changes to nutrition care plan to note  [x] Nutrition Status:  -S/P exploratory laparotomy and Kash patch for hollow viscous injury.  -Continues on TPN; plan for upper GI series to rule out leak.   -NGT remains in place for low wall continuous suctioning.     DIET ORDER:   Diet, NPO (08-11-24)    CURRENT DIET ORDER IS:  [] Appropriate:  [] Inadequate:  [x] Other: Continues on TPN as primary source of nutrition/hydration. Remains NPO.     NUTRITION INTAKE/PROVISION:  [] PO:  [] Enteral Nutrition:  [x] Parenteral Nutrition:    PARENTERAL NUTRITION  [x] CPN (central PN)  [] PPN (peripheral PN; max 900 mOsm/L)  [] Premixed/Multi Chamber Bags     GOAL PN: 135g amino acids, 210g dextrose, 70g SMOF lipid; to provide 1954kcal/day (28.4kcal/kg and 1.96 g/kg protein based on dosing wt 68.8kg)    Non-Protein Calories: 1414 kcal/day (20.6 kcal/kg, based on dosing wt 68.8 kg)  Dextrose Infusion Rate: 2.8mg/kg/min (18-hour infusion)  Lipid Infusion Rate: 1.0 g/kg; 0.08g/kg/hr (12-hour infusion)    Total Volume/Rate ordered: (date)  [] 24-hour infusion: xL @ xx ml/hr  [x] Compressed: 2.4L x 18-hours    Electrolytes and Additives ordered: (8/15):   NaCl (mEq): 40  Na acetate (mEq): 60    Na Phos (mmol): 60  KCL (mEq): 100  Calcium gluconate (mEq): 10  Mg sulfate (mEq): 14  MTE-4 concentrate (ml): 1  MVI (ml): 10  Insulin (units): 0  Thiamine (mg): 100 x 5 days     ANTHROPOMETRICS:  Drug Dosing Weight  Height (cm): 180.3 (11 Aug 2024 18:45)  Weight (kg): 68.8 (11 Aug 2024 18:45)  BMI (kg/m2): 21.2 (11 Aug 2024 18:45)  Weights:   Daily      NUTRITIONALLY PERTINENT MEDICATIONS:  MEDICATIONS  (STANDING):  fluconAZOLE IVPB  fluconAZOLE IVPB  insulin lispro (ADMELOG) corrective regimen sliding scale  lipid, fat emulsion (Fish Oil and Plant Based) 20% Infusion  pantoprazole  Injectable  Parenteral Nutrition - Adult  Parenteral Nutrition - Adult  piperacillin/tazobactam IVPB..     NUTRITIONALLY PERTINENT LABS:  08-15 Na137 mmol/L Glu 104 mg/dL<H> K+ 3.8 mmol/L Cr  0.86 mg/dL BUN 26 mg/dL<H> 08-15 Phos 3.4 mg/dL 08-15 Alb 3.1 g/dL<L> 08-12 PAB 11 mg/dL<L> 08-15 Chol --    LDL --    HDL --    Trig 179 mg/dL<H>08-15 ALT 12 U/L AST 31 U/L Alkaline Phosphatase 64 U/L  08-12-24 @ 16:28 a1c 5.8    A1C with Estimated Average Glucose Result: 5.8 % (08-12-24 @ 16:28)    Finger Sticks:  POCT Blood Glucose.: 136 mg/dL (08-15 @ 13:00)  POCT Blood Glucose.: 140 mg/dL (08-15 @ 06:13)  POCT Blood Glucose.: 128 mg/dL (08-15 @ 00:00)  POCT Blood Glucose.: 119 mg/dL (08-14 @ 17:43)    NUTRITIONALLY PERTINENT MEDICATIONS/LABS:  [x] Reviewed  [x] Relevant notes on medications/labs:  -Hypophosphatemia addressed with 60mEq NaPhos.   -Continues on antibiotics as ordered.   -Prescribed insulin lispro sliding scale to aid in management of BG. A1c 5.8%.     EDEMA:  [x] Reviewed - none noted  [] Relevant notes:    GI/ I&O:  [x] Reviewed  -Colostomy output: none noted  -NGT output: (8/15): 150ml (thus far); (8/14): 700ml.   -BELL bulb #1/#2. See nursing flow sheet for output documentation.   [] Relevant notes:  [] Other:    SKIN:   [x] No pressure injuries documented, per nursing flowsheet  [] Pressure injury previously noted  [] Change in pressure injury documentation:  [] Other:    ESTIMATED NEEDS:  [x] No change:  [] Updated:  Energy: 6320-3006 kcal/day (27-32 kcal/kg)  Protein: 103-138 g/day (1.5-2.0 g/kg)  Fluid:   ml/day or [x] defer to team  Based on: dosing wt 68.8kg    NUTRITION DIAGNOSIS:  [x] Prior Dx: Acute on Chronic Severe Protein Calorie Malnutrition; Increased Nutrient Needs  [] New Dx:    EDUCATION:  [] Yes:  [x] Not appropriate/warranted    NUTRITION CARE PLAN:  1. Diet: TPN as per TPN team, nutrition assessment  2. Multivitamin/mineral supplementation: Thiamine 100mg x 5 days via TPN.     MONITORING AND EVALUATION:   RD remains available upon request and will follow up per protocol.    Cielo Kimbrough RD, available via TEAMS NUTRITION FOLLOW UP NOTE    PATIENT SEEN FOR: Nutrition Follow Up    SOURCE: [] Patient  [x] Current Medical Record  [x] RN  [] Family/support person at bedside  [] Patient unavailable/inappropriate  [x] Other: interdisciplinary medical team    CHART REVIEWED/EVENTS NOTED.  [] No changes to nutrition care plan to note  [x] Nutrition Status:  -S/P exploratory laparotomy and Kash patch for hollow viscous injury.  -Continues on TPN; plan for upper GI series to rule out leak.   -NGT remains in place for low wall continuous suctioning.     DIET ORDER:   Diet, NPO (08-11-24)    CURRENT DIET ORDER IS:  [] Appropriate:  [] Inadequate:  [x] Other: Continues on TPN as primary source of nutrition/hydration. Remains NPO.     NUTRITION INTAKE/PROVISION:  [] PO:  [] Enteral Nutrition:  [x] Parenteral Nutrition:    PARENTERAL NUTRITION  [x] CPN (central PN)  [] PPN (peripheral PN; max 900 mOsm/L)  [] Premixed/Multi Chamber Bags     GOAL PN: 135g amino acids, 210g dextrose, 70g SMOF lipid; to provide 1954kcal/day (28.4kcal/kg and 1.96 g/kg protein based on dosing wt 68.8kg)    Non-Protein Calories: 1414 kcal/day (20.6 kcal/kg, based on dosing wt 68.8 kg)  Dextrose Infusion Rate: 2.8mg/kg/min (18-hour infusion)  Lipid Infusion Rate: 1.0 g/kg; 0.08g/kg/hr (12-hour infusion)    Total Volume/Rate ordered: (date)  [] 24-hour infusion: xL @ xx ml/hr  [x] Compressed: 2.4L x 18-hours    Electrolytes and Additives ordered: (8/15):   NaCl (mEq): 40  Na acetate (mEq): 60    Na Phos (mmol): 60  KCL (mEq): 100  Calcium gluconate (mEq): 10  Mg sulfate (mEq): 14  MTE-4 concentrate (ml): 1  MVI (ml): 10  Insulin (units): 0  Thiamine (mg): 100 x 5 days     ANTHROPOMETRICS:  Drug Dosing Weight  Height (cm): 180.3 (11 Aug 2024 18:45)  Weight (kg): 68.8 (11 Aug 2024 18:45)  BMI (kg/m2): 21.2 (11 Aug 2024 18:45)  Weights:   Daily      NUTRITIONALLY PERTINENT MEDICATIONS:  MEDICATIONS  (STANDING):  fluconAZOLE IVPB  fluconAZOLE IVPB  insulin lispro (ADMELOG) corrective regimen sliding scale  lipid, fat emulsion (Fish Oil and Plant Based) 20% Infusion  pantoprazole  Injectable  Parenteral Nutrition - Adult  Parenteral Nutrition - Adult  piperacillin/tazobactam IVPB..     NUTRITIONALLY PERTINENT LABS:  08-15 Na137 mmol/L Glu 104 mg/dL<H> K+ 3.8 mmol/L Cr  0.86 mg/dL BUN 26 mg/dL<H> 08-15 Phos 3.4 mg/dL 08-15 Alb 3.1 g/dL<L> 08-12 PAB 11 mg/dL<L> 08-15 Chol --    LDL --    HDL --    Trig 179 mg/dL<H>08-15 ALT 12 U/L AST 31 U/L Alkaline Phosphatase 64 U/L  08-12-24 @ 16:28 a1c 5.8    A1C with Estimated Average Glucose Result: 5.8 % (08-12-24 @ 16:28)    Finger Sticks:  POCT Blood Glucose.: 136 mg/dL (08-15 @ 13:00)  POCT Blood Glucose.: 140 mg/dL (08-15 @ 06:13)  POCT Blood Glucose.: 128 mg/dL (08-15 @ 00:00)  POCT Blood Glucose.: 119 mg/dL (08-14 @ 17:43)    NUTRITIONALLY PERTINENT MEDICATIONS/LABS:  [x] Reviewed  [x] Relevant notes on medications/labs:  -Hypophosphatemia addressed with 60mEq NaPhos.   -Continues on antibiotics as ordered.   -Prescribed insulin lispro sliding scale to aid in management of BG. A1c 5.8%.     EDEMA:  [x] Reviewed - none noted  [] Relevant notes:    GI/ I&O:  [x] Reviewed  -Colostomy output: none noted  -NGT output: (8/15): 150ml (thus far); (8/14): 700ml.   -BELL bulb #1/#2. See nursing flow sheet for output documentation.   [] Relevant notes:  [] Other:    SKIN:   [x] No pressure injuries documented, per nursing flowsheet  [] Pressure injury previously noted  [] Change in pressure injury documentation:  [] Other:    ESTIMATED NEEDS:  [x] No change:  [] Updated:  Energy: 8984-4625 kcal/day (27-32 kcal/kg)  Protein: 103-138 g/day (1.5-2.0 g/kg)  Fluid:   ml/day or [x] defer to team  Based on: dosing wt 68.8kg    NUTRITION DIAGNOSIS:  [x] Prior Dx: Acute on Chronic Severe Protein Calorie Malnutrition; Increased Nutrient Needs  [] New Dx:    EDUCATION:  [] Yes:  [x] Not appropriate/warranted    NUTRITION CARE PLAN:  1. Diet: TPN as per TPN team, nutrition assessment. Advancement of diet deferred to team.   2. Multivitamin/mineral supplementation: Thiamine 100mg x 5 days via TPN.     MONITORING AND EVALUATION:   RD remains available upon request and will follow up per protocol.    Cielo Kimbrough RD, available via TEAMS

## 2024-08-15 NOTE — PROGRESS NOTE ADULT - ASSESSMENT
79 year old man with a history of locally advanced rectal adenocarcinoma status post neoadjuvant chemoradiotherapy and abdominoperineal resection (Dr. 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   Patient planned for NPO x 5 days followed by UGI series to check for leak prior to considering enteral diet.  TPN team consulted given anticipated prolonged NPO status.     GOAL PN: 135g amino acids, 210g dextrose, 70g SMOF lipid; to provide 1954kcal/day (28.4kcal/kg and 1.96 g/kg protein based on dosing wt 68.8kg)    - Nutritional Assessment, patient not meeting nutritional goals enterally and would benefit from TPN for nutritional support given severe protein calorie malnutrition with prealbumin of 11 mg/dL from 8/12; trend prealbumin weekly   - TPN plan: continue goal AA/Dex/SMOF;  continue NaCl 40 mEq, Na Acetate 60 mEq, CaGlu 10 mEq, Mg 14 mEq, TE 1 mL, MVI 10 mL over 24 hours; compress to 18 hours in anticipation of possible d/c home with TPN  - TPN access:  PICC with dedicated port in place; maintain per protocol   - Electrolyte Imbalance risk- check CMP, Mg, Phos, iCa daily, will adjust in TPN as needed  - Hypophosphatemia:  continue NaPhos to 60 mM  - Hypokalemia:  increase KCl 100 mEq  - Uptrend in sodium:  increase free water to 2400 mL  - Anemia:  Iron studies, B12, folate reviewed; recommend Folate repletion orally once enteral diet resumes  - Risk of glucose dysfunction with TPN: HgA1c 5.8%; finger sticks every 6 hours to monitor glucose trend; no RI in TPN  - Risk for Hypertriglyceridemia with TPN:  baseline TG 74 mg/dL --> 137 mg/dL from 8/14; monitor TG closely on TPN  - Refeeding syndrome:  continue Thiamine 100 mg daily x 5 days; monitor potassium, magnesium, phosphorus, glucose and fluid balance closely  - Continue strict Intake and Output; check weights three times a week;  pg/mL  - Global care per primary team       Available on TEAMS  TPN spectra [id number] ([phone] when dialing from outside line)  M-F 8A-2P, Weekends and holidays 8/9A-12/1P  Discussed with Dr. Mark Buckley

## 2024-08-15 NOTE — PROGRESS NOTE ADULT - SUBJECTIVE AND OBJECTIVE BOX
SURGERY DAILY PROGRESS NOTE:       Subjective / Overnight events:  NGT repositioned overnight, CXR performed.  Feels ok, denies N/V.   Pain controlled with medications.      Objective:      MEDICATIONS  (STANDING):  chlorhexidine 2% Cloths 1 Application(s) Topical <User Schedule>  enoxaparin Injectable 40 milliGRAM(s) SubCutaneous every 24 hours  fluconAZOLE IVPB 400 milliGRAM(s) IV Intermittent every 24 hours  fluconAZOLE IVPB      insulin lispro (ADMELOG) corrective regimen sliding scale   SubCutaneous every 6 hours  lipid, fat emulsion (Fish Oil and Plant Based) 20% Infusion 29.2 mL/Hr (29.2 mL/Hr) IV Continuous <Continuous>  pantoprazole  Injectable 40 milliGRAM(s) IV Push two times a day  Parenteral Nutrition - Adult 1 Each (100 mL/Hr) TPN Continuous <Continuous>  piperacillin/tazobactam IVPB.. 3.375 Gram(s) IV Intermittent every 8 hours    MEDICATIONS  (PRN):  benzocaine 20% Spray 1 Spray(s) Topical every 6 hours PRN Sore throat  HYDROmorphone  Injectable 0.25 milliGRAM(s) IV Push every 3 hours PRN Moderate Pain (4 - 6)  HYDROmorphone  Injectable 0.5 milliGRAM(s) IV Push every 3 hours PRN Severe Pain (7 - 10)      Vital Signs Last 24 Hrs  T(C): 37.3 (15 Aug 2024 05:16), Max: 37.4 (15 Aug 2024 00:39)  T(F): 99.1 (15 Aug 2024 05:16), Max: 99.4 (15 Aug 2024 00:39)  HR: 64 (15 Aug 2024 05:16) (60 - 76)  BP: 115/76 (15 Aug 2024 05:16) (115/76 - 158/96)  BP(mean): --  RR: 18 (15 Aug 2024 05:16) (18 - 18)  SpO2: 95% (15 Aug 2024 05:16) (95% - 97%)    Parameters below as of 15 Aug 2024 05:16  Patient On (Oxygen Delivery Method): room air        I&O's Detail    14 Aug 2024 07:01  -  15 Aug 2024 07:00  --------------------------------------------------------  IN:    Fat Emulsion (Fish Oil &amp; Plant Based) 20% Infusion: 29.2 mL    TPN (Total Parenteral Nutrition): 1300 mL  Total IN: 1329.2 mL    OUT:    Bulb (mL): 30 mL    Bulb (mL): 25 mL    Colostomy (mL): 0 mL    Indwelling Catheter - Urethral (mL): 300 mL    Nasogastric/Oral tube (mL): 650 mL    Oral Fluid: 0 mL    Voided (mL): 1260 mL  Total OUT: 2265 mL    Total NET: -935.8 mL          Daily     Daily     LABS:                        10.7   5.84  )-----------( 154      ( 14 Aug 2024 06:39 )             31.3     08-14    140  |  105  |  20  ----------------------------<  101<H>  3.8   |  23  |  0.93    Ca    9.4      14 Aug 2024 06:39  Phos  3.2     08-14  Mg     1.8     08-14    TPro  5.8<L>  /  Alb  2.9<L>  /  TBili  0.5  /  DBili  x   /  AST  24  /  ALT  11  /  AlkPhos  53  08-14      Urinalysis Basic - ( 14 Aug 2024 06:39 )    Color: x / Appearance: x / SG: x / pH: x  Gluc: 101 mg/dL / Ketone: x  / Bili: x / Urobili: x   Blood: x / Protein: x / Nitrite: x   Leuk Esterase: x / RBC: x / WBC x   Sq Epi: x / Non Sq Epi: x / Bacteria: x            Physical Exam:  General Appearance: Appears well, NAD, +NGT  Neck: Supple  Chest: Equal expansion bilaterally, equal breath sounds  CV: Pulse regular presently  Abdomen: Soft, nontender, prevena vac in place, 2 left BELL drains SS,  appropriate incisional tenderness, dressings clean and dry and intact  Extremities: Grossly symmetric, SCD's in place

## 2024-08-15 NOTE — PROGRESS NOTE ADULT - SUBJECTIVE AND OBJECTIVE BOX
[facility] NUTRITION SUPPORT-- FOLLOW UP NOTE      24 hour events/subjective:  Patient seen and examined at bedside, chart reviewed and events noted and I's and O's reviewed.  Patient denies chest pain, shortness of breath, nausea or vomiting, dizziness, chills at time of visit; continues with NGT decompression and planned for upper GI series today.  Patient's VSS and no other acute overnight events noted.   Patient continues on TPN and glucose trend is within appropriate range.      ROS:  Except as noted above, all other systems reviewed and are negative     Diet:  Diet, NPO (08-11-24 @ 19:31)      PAST HISTORY  --------------------------------------------------------------------------------  PAST MEDICAL & SURGICAL HISTORY:  Rectal Neoplasm  treated with oral chemothearpy and radiation 5/10- 6/10        Hemorrhoid      Malignant neoplasm of oropharynx      Colostomy status      Neck mass      S/P Colonoscopy  3/10      Rectal cancer      S/P colostomy      History of cancer chemotherapy      S/P radiation therapy        No significant changes to PMH, PSH, FHx, SHx, unless otherwise noted    ALLERGIES & MEDICATIONS  --------------------------------------------------------------------------------  Allergies    No Known Allergies    Intolerances      Standing Inpatient Medications  chlorhexidine 2% Cloths 1 Application(s) Topical <User Schedule>  enoxaparin Injectable 40 milliGRAM(s) SubCutaneous every 24 hours  fluconAZOLE IVPB      fluconAZOLE IVPB 400 milliGRAM(s) IV Intermittent every 24 hours  insulin lispro (ADMELOG) corrective regimen sliding scale   SubCutaneous every 6 hours  lipid, fat emulsion (Fish Oil and Plant Based) 20% Infusion 29.2 mL/Hr IV Continuous <Continuous>  pantoprazole  Injectable 40 milliGRAM(s) IV Push two times a day  Parenteral Nutrition - Adult 1 Each TPN Continuous <Continuous>  Parenteral Nutrition - Adult 1 Each TPN Continuous <Continuous>  piperacillin/tazobactam IVPB.. 3.375 Gram(s) IV Intermittent every 8 hours    PRN Inpatient Medications  benzocaine 20% Spray 1 Spray(s) Topical every 6 hours PRN  HYDROmorphone  Injectable 0.5 milliGRAM(s) IV Push every 3 hours PRN  HYDROmorphone  Injectable 0.25 milliGRAM(s) IV Push every 3 hours PRN        VITALS/PHYSICAL EXAM  --------------------------------------------------------------------------------  T(C): 37 (08-15-24 @ 09:28), Max: 37.4 (08-15-24 @ 00:39)  HR: 68 (08-15-24 @ 09:28) (60 - 76)  BP: 120/82 (08-15-24 @ 09:28) (115/76 - 158/96)  RR: 18 (08-15-24 @ 09:28) (18 - 18)  SpO2: 96% (08-15-24 @ 09:28) (95% - 97%)  Wt(kg): --      08-14-24 @ 07:01  -  08-15-24 @ 07:00  --------------------------------------------------------  IN: 1329.2 mL / OUT: 2265 mL / NET: -935.8 mL    08-15-24 @ 07:01  -  08-15-24 @ 12:59  --------------------------------------------------------  IN: 0 mL / OUT: 150 mL / NET: -150 mL      I&O's Detail    14 Aug 2024 07:01  -  15 Aug 2024 07:00  --------------------------------------------------------  IN:    Fat Emulsion (Fish Oil &amp; Plant Based) 20% Infusion: 29.2 mL    TPN (Total Parenteral Nutrition): 1300 mL  Total IN: 1329.2 mL    OUT:    Bulb (mL): 30 mL    Bulb (mL): 25 mL    Colostomy (mL): 0 mL    Indwelling Catheter - Urethral (mL): 300 mL    Nasogastric/Oral tube (mL): 650 mL    Oral Fluid: 0 mL    Voided (mL): 1260 mL  Total OUT: 2265 mL    Total NET: -935.8 mL      15 Aug 2024 07:01  -  15 Aug 2024 12:59  --------------------------------------------------------  IN:  Total IN: 0 mL    OUT:    Colostomy (mL): 0 mL    Voided (mL): 150 mL  Total OUT: 150 mL    Total NET: -150 mL          Physical Exam:  Gen: NAD, frail appearing  HEENT: supple neck, no JVD; +NGT; Neck discoloration/darkening  Chest: non labored breathing, equal chest expansion bilaterally  Abd:  Soft, nontender, prevena vac in place, 2 left BELL drains SS, appropriate incisional tenderness, dressings c/d/i  : No suprapubic tenderness  Ext: Warm, FROM, no edema b/l LE  Neuro: No focal deficits  Psych: Normal affect and mood  Skin: Warm, without rashes         LABS/STUDIES  --------------------------------------------------------------------------------              11.0   4.81  >-----------<  144      [08-15-24 @ 07:15]              32.9     137  |  101  |  26  ----------------------------<  104      [08-15-24 @ 07:13]  3.8   |  23  |  0.86        Ca     9.1     [08-15-24 @ 07:13]      iCa    1.22     [08-15 @ 07:13]      Mg     1.8     [08-15-24 @ 07:13]      Phos  3.4     [08-15-24 @ 07:13]    TPro  6.2  /  Alb  3.1  /  TBili  0.6  /  DBili  x   /  AST  31  /  ALT  12  /  AlkPhos  64  [08-15-24 @ 07:13]          Ca ionized  Creatinine Trend:  POC glucoseGlucose: 104 mg/dL (08-15-24 @ 07:13)  CAPILLARY BLOOD GLUCOSE      POCT Blood Glucose.: 140 mg/dL (15 Aug 2024 06:13)  POCT Blood Glucose.: 128 mg/dL (15 Aug 2024 00:00)  POCT Blood Glucose.: 119 mg/dL (14 Aug 2024 17:43)    PrealbuminPrealbumin, Serum: 11 mg/dL (08-12-24 @ 16:28)    Triglycerides

## 2024-08-15 NOTE — PROGRESS NOTE ADULT - ASSESSMENT
Mr. Fulton is a 79 year old man with a history of locally advanced rectal adenocarcinoma status post neoadjuvant chemoradiotherapy and abdominoperineal resection (Dr. 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    Plan:  - NPO/NGT  - TPN  - PPI BID for ulcer ppx  - NPO for 5 days (8/16) --> upper G series to r/o leak  - pain control  - OOB/amb  - PT eval:  home PT with [facility] [place]    Acute care surgery, pager#[phone]

## 2024-08-15 NOTE — CHART NOTE - NSCHARTNOTEFT_GEN_A_CORE
Nursing informed team that NGT tube had come off the tape.     Patient was evaluated and NGT had come out about 4cm based on tape placement.     Urgent CXR was ordered and tip was still enteric. NGT was advanced 2-3cm and re-secured onto the nose. Repeat CXR at bedside confirmed placement in the stomach.

## 2024-08-15 NOTE — PROGRESS NOTE ADULT - ASSESSMENT
Perforated pyloric ulcer   s/p repair   fu with surgery     HTN  labile    sinus bradycardia   asymptomatic  stable now

## 2024-08-15 NOTE — PROGRESS NOTE ADULT - SUBJECTIVE AND OBJECTIVE BOX
Subjective: Patient seen and examined. No new events except as noted.     SUBJECTIVE/ROS:  nad      MEDICATIONS:  MEDICATIONS  (STANDING):  chlorhexidine 2% Cloths 1 Application(s) Topical <User Schedule>  enoxaparin Injectable 40 milliGRAM(s) SubCutaneous every 24 hours  fluconAZOLE IVPB      fluconAZOLE IVPB 400 milliGRAM(s) IV Intermittent every 24 hours  insulin lispro (ADMELOG) corrective regimen sliding scale   SubCutaneous every 6 hours  lipid, fat emulsion (Fish Oil and Plant Based) 20% Infusion 29.2 mL/Hr (29.2 mL/Hr) IV Continuous <Continuous>  pantoprazole  Injectable 40 milliGRAM(s) IV Push two times a day  Parenteral Nutrition - Adult 1 Each (100 mL/Hr) TPN Continuous <Continuous>  piperacillin/tazobactam IVPB.. 3.375 Gram(s) IV Intermittent every 8 hours      PHYSICAL EXAM:  T(C): 37.3 (08-15-24 @ 05:16), Max: 37.4 (08-15-24 @ 00:39)  HR: 64 (08-15-24 @ 05:16) (60 - 76)  BP: 115/76 (08-15-24 @ 05:16) (115/76 - 158/96)  RR: 18 (08-15-24 @ 05:16) (18 - 18)  SpO2: 95% (08-15-24 @ 05:16) (95% - 97%)  Wt(kg): --  I&O's Summary    14 Aug 2024 07:01  -  15 Aug 2024 07:00  --------------------------------------------------------  IN: 1329.2 mL / OUT: 2265 mL / NET: -935.8 mL            JVP: Normal  Neck: supple  Lung: clear   CV: S1 S2 , Murmur:  Abd: soft  Ext: No edema  neuro: Awake / alert  Psych: flat affect  Skin: normal``    LABS/DATA:    CARDIAC MARKERS:                                11.0   4.81  )-----------( 144      ( 15 Aug 2024 07:15 )             32.9     08-15    137  |  101  |  26<H>  ----------------------------<  104<H>  3.8   |  23  |  0.86    Ca    9.1      15 Aug 2024 07:13  Phos  3.4     08-15  Mg     1.8     08-15    TPro  6.2  /  Alb  3.1<L>  /  TBili  0.6  /  DBili  x   /  AST  31  /  ALT  12  /  AlkPhos  64  08-15    proBNP:   Lipid Profile:   HgA1c:   TSH:     TELE:  EKG:

## 2024-08-16 ENCOUNTER — TRANSCRIPTION ENCOUNTER (OUTPATIENT)
Age: 79
End: 2024-08-16

## 2024-08-16 ENCOUNTER — NON-APPOINTMENT (OUTPATIENT)
Age: 79
End: 2024-08-16

## 2024-08-16 LAB
ALBUMIN SERPL ELPH-MCNC: 3 G/DL — LOW (ref 3.3–5)
ALP SERPL-CCNC: 83 U/L — SIGNIFICANT CHANGE UP (ref 40–120)
ALT FLD-CCNC: 33 U/L — SIGNIFICANT CHANGE UP (ref 10–45)
ANION GAP SERPL CALC-SCNC: 12 MMOL/L — SIGNIFICANT CHANGE UP (ref 5–17)
AST SERPL-CCNC: 55 U/L — HIGH (ref 10–40)
BASOPHILS # BLD AUTO: 0.01 K/UL — SIGNIFICANT CHANGE UP (ref 0–0.2)
BASOPHILS NFR BLD AUTO: 0.2 % — SIGNIFICANT CHANGE UP (ref 0–2)
BILIRUB SERPL-MCNC: 0.5 MG/DL — SIGNIFICANT CHANGE UP (ref 0.2–1.2)
BUN SERPL-MCNC: 33 MG/DL — HIGH (ref 7–23)
CA-I BLD-SCNC: 1.22 MMOL/L — SIGNIFICANT CHANGE UP (ref 1.15–1.33)
CALCIUM SERPL-MCNC: 9 MG/DL — SIGNIFICANT CHANGE UP (ref 8.4–10.5)
CHLORIDE SERPL-SCNC: 103 MMOL/L — SIGNIFICANT CHANGE UP (ref 96–108)
CO2 SERPL-SCNC: 22 MMOL/L — SIGNIFICANT CHANGE UP (ref 22–31)
CREAT SERPL-MCNC: 0.82 MG/DL — SIGNIFICANT CHANGE UP (ref 0.5–1.3)
EGFR: 90 ML/MIN/1.73M2 — SIGNIFICANT CHANGE UP
EOSINOPHIL # BLD AUTO: 0.04 K/UL — SIGNIFICANT CHANGE UP (ref 0–0.5)
EOSINOPHIL NFR BLD AUTO: 1 % — SIGNIFICANT CHANGE UP (ref 0–6)
GLUCOSE BLDC GLUCOMTR-MCNC: 107 MG/DL — HIGH (ref 70–99)
GLUCOSE BLDC GLUCOMTR-MCNC: 125 MG/DL — HIGH (ref 70–99)
GLUCOSE BLDC GLUCOMTR-MCNC: 136 MG/DL — HIGH (ref 70–99)
GLUCOSE BLDC GLUCOMTR-MCNC: 139 MG/DL — HIGH (ref 70–99)
GLUCOSE SERPL-MCNC: 122 MG/DL — HIGH (ref 70–99)
HCT VFR BLD CALC: 31.6 % — LOW (ref 39–50)
HGB BLD-MCNC: 10.7 G/DL — LOW (ref 13–17)
IMM GRANULOCYTES NFR BLD AUTO: 1.9 % — HIGH (ref 0–0.9)
LYMPHOCYTES # BLD AUTO: 0.14 K/UL — LOW (ref 1–3.3)
LYMPHOCYTES # BLD AUTO: 3.4 % — LOW (ref 13–44)
MAGNESIUM SERPL-MCNC: 1.8 MG/DL — SIGNIFICANT CHANGE UP (ref 1.6–2.6)
MCHC RBC-ENTMCNC: 31.1 PG — SIGNIFICANT CHANGE UP (ref 27–34)
MCHC RBC-ENTMCNC: 33.9 GM/DL — SIGNIFICANT CHANGE UP (ref 32–36)
MCV RBC AUTO: 91.9 FL — SIGNIFICANT CHANGE UP (ref 80–100)
MONOCYTES # BLD AUTO: 0.26 K/UL — SIGNIFICANT CHANGE UP (ref 0–0.9)
MONOCYTES NFR BLD AUTO: 6.3 % — SIGNIFICANT CHANGE UP (ref 2–14)
NEUTROPHILS # BLD AUTO: 3.6 K/UL — SIGNIFICANT CHANGE UP (ref 1.8–7.4)
NEUTROPHILS NFR BLD AUTO: 87.2 % — HIGH (ref 43–77)
NRBC # BLD: 0 /100 WBCS — SIGNIFICANT CHANGE UP (ref 0–0)
PHOSPHATE SERPL-MCNC: 3.5 MG/DL — SIGNIFICANT CHANGE UP (ref 2.5–4.5)
PLATELET # BLD AUTO: 138 K/UL — LOW (ref 150–400)
POTASSIUM SERPL-MCNC: 4.3 MMOL/L — SIGNIFICANT CHANGE UP (ref 3.5–5.3)
POTASSIUM SERPL-SCNC: 4.3 MMOL/L — SIGNIFICANT CHANGE UP (ref 3.5–5.3)
PROT SERPL-MCNC: 6 G/DL — SIGNIFICANT CHANGE UP (ref 6–8.3)
RBC # BLD: 3.44 M/UL — LOW (ref 4.2–5.8)
RBC # FLD: 15.8 % — HIGH (ref 10.3–14.5)
SODIUM SERPL-SCNC: 137 MMOL/L — SIGNIFICANT CHANGE UP (ref 135–145)
TRIGL SERPL-MCNC: 127 MG/DL — SIGNIFICANT CHANGE UP
WBC # BLD: 4.13 K/UL — SIGNIFICANT CHANGE UP (ref 3.8–10.5)
WBC # FLD AUTO: 4.13 K/UL — SIGNIFICANT CHANGE UP (ref 3.8–10.5)

## 2024-08-16 PROCEDURE — 99232 SBSQ HOSP IP/OBS MODERATE 35: CPT

## 2024-08-16 PROCEDURE — 74240 X-RAY XM UPR GI TRC 1CNTRST: CPT | Mod: 26

## 2024-08-16 RX ORDER — ACETAMINOPHEN 325 MG/1
975 TABLET ORAL EVERY 6 HOURS
Refills: 0 | Status: DISCONTINUED | OUTPATIENT
Start: 2024-08-16 | End: 2024-08-19

## 2024-08-16 RX ORDER — OXYCODONE HYDROCHLORIDE 5 MG/1
5 TABLET ORAL EVERY 4 HOURS
Refills: 0 | Status: DISCONTINUED | OUTPATIENT
Start: 2024-08-16 | End: 2024-08-19

## 2024-08-16 RX ORDER — ELECTROLYTE SOLUTION,INJ
1 VIAL (ML) INTRAVENOUS
Refills: 0 | Status: DISCONTINUED | OUTPATIENT
Start: 2024-08-16 | End: 2024-08-17

## 2024-08-16 RX ORDER — I.V. FAT EMULSION 20 G/100ML
29.2 EMULSION INTRAVENOUS
Qty: 70 | Refills: 0 | Status: DISCONTINUED | OUTPATIENT
Start: 2024-08-16 | End: 2024-08-17

## 2024-08-16 RX ORDER — DEXTROSE 15 G/33 G
15 GEL IN PACKET (GRAM) ORAL ONCE
Refills: 0 | Status: DISCONTINUED | OUTPATIENT
Start: 2024-08-16 | End: 2024-08-19

## 2024-08-16 RX ORDER — OXYCODONE HYDROCHLORIDE 5 MG/1
2.5 TABLET ORAL EVERY 4 HOURS
Refills: 0 | Status: DISCONTINUED | OUTPATIENT
Start: 2024-08-16 | End: 2024-08-19

## 2024-08-16 RX ADMIN — OXYCODONE HYDROCHLORIDE 5 MILLIGRAM(S): 5 TABLET ORAL at 20:49

## 2024-08-16 RX ADMIN — Medication 1 EACH: at 07:11

## 2024-08-16 RX ADMIN — I.V. FAT EMULSION 29.2 ML/HR: 20 EMULSION INTRAVENOUS at 18:20

## 2024-08-16 RX ADMIN — HYDROMORPHONE HYDROCHLORIDE 0.5 MILLIGRAM(S): 2 TABLET ORAL at 12:36

## 2024-08-16 RX ADMIN — OXYCODONE HYDROCHLORIDE 5 MILLIGRAM(S): 5 TABLET ORAL at 22:03

## 2024-08-16 RX ADMIN — I.V. FAT EMULSION 29.2 ML/HR: 20 EMULSION INTRAVENOUS at 19:06

## 2024-08-16 RX ADMIN — PIPERACILLIN SODIUM AND TAZOBACTAM SODIUM 25 GRAM(S): 3; .375 INJECTION, POWDER, FOR SOLUTION INTRAVENOUS at 13:34

## 2024-08-16 RX ADMIN — HYDROMORPHONE HYDROCHLORIDE 0.5 MILLIGRAM(S): 2 TABLET ORAL at 03:30

## 2024-08-16 RX ADMIN — ACETAMINOPHEN 975 MILLIGRAM(S): 325 TABLET ORAL at 20:00

## 2024-08-16 RX ADMIN — Medication 40 MILLIGRAM(S): at 17:11

## 2024-08-16 RX ADMIN — CHLORHEXIDINE GLUCONATE 1 APPLICATION(S): 40 SOLUTION TOPICAL at 07:12

## 2024-08-16 RX ADMIN — ENOXAPARIN SODIUM 40 MILLIGRAM(S): 100 INJECTION SUBCUTANEOUS at 05:57

## 2024-08-16 RX ADMIN — HYDROMORPHONE HYDROCHLORIDE 0.5 MILLIGRAM(S): 2 TABLET ORAL at 04:00

## 2024-08-16 RX ADMIN — Medication 1 EACH: at 19:06

## 2024-08-16 RX ADMIN — HYDROMORPHONE HYDROCHLORIDE 0.5 MILLIGRAM(S): 2 TABLET ORAL at 16:10

## 2024-08-16 RX ADMIN — ACETAMINOPHEN 975 MILLIGRAM(S): 325 TABLET ORAL at 19:07

## 2024-08-16 RX ADMIN — Medication 1 EACH: at 18:20

## 2024-08-16 RX ADMIN — Medication 40 MILLIGRAM(S): at 05:57

## 2024-08-16 RX ADMIN — HYDROMORPHONE HYDROCHLORIDE 0.5 MILLIGRAM(S): 2 TABLET ORAL at 16:40

## 2024-08-16 RX ADMIN — HYDROMORPHONE HYDROCHLORIDE 0.5 MILLIGRAM(S): 2 TABLET ORAL at 12:06

## 2024-08-16 RX ADMIN — PIPERACILLIN SODIUM AND TAZOBACTAM SODIUM 25 GRAM(S): 3; .375 INJECTION, POWDER, FOR SOLUTION INTRAVENOUS at 05:58

## 2024-08-16 RX ADMIN — PIPERACILLIN SODIUM AND TAZOBACTAM SODIUM 25 GRAM(S): 3; .375 INJECTION, POWDER, FOR SOLUTION INTRAVENOUS at 20:49

## 2024-08-16 NOTE — PROGRESS NOTE ADULT - ASSESSMENT
79 year old man with a history of locally advanced rectal adenocarcinoma status post neoadjuvant chemoradiotherapy and abdominoperineal resection (Dr. 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   Patient planned for NPO x 5 days followed by UGI series to check for leak prior to considering enteral diet.  TPN team consulted given anticipated prolonged NPO status.     GOAL PN: 135g amino acids, 210g dextrose, 70g SMOF lipid; to provide 1954kcal/day (28.4kcal/kg and 1.96 g/kg protein based on dosing wt 68.8kg)    - Nutritional Assessment, patient not meeting nutritional goals enterally and would benefit from TPN for nutritional support given severe protein calorie malnutrition with prealbumin of 11 mg/dL from 8/12; trend prealbumin weekly   - TPN plan: continue goal AA/Dex/SMOF;  continue NaCl 40 mEq, Na Acetate 60 mEq, CaGlu 10 mEq, Mg 14 mEq, TE 1 mL, MVI 10 mL over 24 hours; compress to 18 hours in anticipation of possible d/c home with TPN  - TPN access:  PICC with dedicated port in place; maintain per protocol   - Electrolyte Imbalance risk- check CMP, Mg, Phos, iCa daily, will adjust in TPN as needed  - Hypophosphatemia:  continue NaPhos to 60 mM  - Hypokalemia:  increase KCl 100 mEq  - Uptrend in sodium:  increase free water to 2400 mL  - Anemia:  Iron studies, B12, folate reviewed; recommend Folate repletion orally once enteral diet resumes  - Risk of glucose dysfunction with TPN: HgA1c 5.8%; finger sticks every 6 hours to monitor glucose trend; no RI in TPN  - Risk for Hypertriglyceridemia with TPN:  baseline TG 74 mg/dL --> 137 mg/dL from 8/14; monitor TG closely on TPN  - Refeeding syndrome:  continue Thiamine 100 mg daily x 5 days; monitor potassium, magnesium, phosphorus, glucose and fluid balance closely  - Continue strict Intake and Output; check weights three times a week;  pg/mL  - Global care per primary team       Available on TEAMS  TPN spectra [id number] ([phone] when dialing from outside line)  M-F 8A-2P, Weekends and holidays 8/9A-12/1P  Discussed with Dr. Mark Buckley      79 year old man with a history of locally advanced rectal adenocarcinoma status post neoadjuvant chemoradiotherapy and abdominoperineal resection (Dr. 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   Patient planned for NPO x 5 days followed by UGI series to check for leak prior to considering enteral diet. TPN team consulted given anticipated prolonged NPO status.     GOAL PN: 135g amino acids, 210g dextrose, 70g SMOF lipid; to provide 1954kcal/day (28.4kcal/kg and 1.96 g/kg protein based on dosing wt 68.8kg)    - Nutritional Assessment, patient not meeting nutritional goals enterally and is benefiting from TPN for nutritional support given severe protein calorie malnutrition with prealbumin of 11 mg/dL from 8/12; trend prealbumin weekly   - TPN plan: continue goal AA/Dex/SMOF; compress to 16 hours in anticipation of possible d/c home with TPN  - TPN access:  PICC with dedicated port in place; maintain per protocol   - Electrolyte Imbalance risk- check CMP, Mg, Phos, iCa daily, will adjust in TPN as needed  - Hypophosphatemia:  continue NaPhos to 60 mM  - Hypokalemia:  increase KCl 100 mEq  - Uptrend in sodium:  increased free water of 2400 mL  - Anemia:  Iron studies, B12, folate reviewed; recommend Folate repletion orally once enteral diet resumes  - Risk of glucose dysfunction with TPN: HgA1c 5.8%; finger sticks every 6 hours to monitor glucose trend; no RI in TPN  - Risk for Hypertriglyceridemia with TPN:  baseline TG 74 mg/dL --> 137 mg/dL from 8/14; monitor TG closely on TPN  - Refeeding syndrome: continue Thiamine 100 mg daily x 5 days; monitor potassium, magnesium, phosphorus, glucose and fluid balance closely.  -Outpatient follow up with Dr David Umaña:    [address]  [place] [id number]  Entrance on [facility]  Phone [phone]    Please instruct patient when calling to make the appointment to schedule for the first MONDAY after discharge from hospital/rehab.  Please instruct patient to explain he/she is on TPN when making the appointment. Check labs, CMP, electrolytes, ionized Calcium, triglycerides and fingersticks- frequency to be determined by Dr. Umaña as outpatient.    - Continue strict Intake and Output; check weights three times a week;  pg/mL  -  Further care per primary team, Acute Care Surgery.       Available on TEAMS  TPN spectra [id number] ([phone] when dialing from outside line)  M-F 8A-2P, Weekends and holidays 8/9A-12/1P  Discussed with Dr. Mark Buckley

## 2024-08-16 NOTE — PROGRESS NOTE ADULT - ASSESSMENT
Mr. Fulton is a 79 year old man with a history of locally advanced rectal adenocarcinoma status post neoadjuvant chemoradiotherapy and abdominoperineal resection (Dr. 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 Fulton is a 79 year old man with a history of locally advanced rectal adenocarcinoma status post neoadjuvant chemoradiotherapy and abdominoperineal resection (Dr. 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 Fulton is a 79 year old man with a history of locally advanced rectal adenocarcinoma status post neoadjuvant chemoradiotherapy and abdominoperineal resection (Dr. 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

## 2024-08-16 NOTE — DISCHARGE NOTE PROVIDER - HOSPITAL COURSE
78 y/o Male with PMHx of rectal CA s/p APR in 2010 by Dr. 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 Patient denies nausea or vomiting. 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 No fevers/chills, chest pain/shortness of breath, or dizziness/lightheadedness.  In the ED, patient was found to be afebrile, hemodynamically stable, labs were largely unremarkable. CT Abd/Pelvis with IV contrast (8/11/24) revealed pneumoperitoneum with concerns for hollow viscus. Repeat CT Abd/Pelvis with PO contrast (8/11/24) revealed slight oral contrast extravasation, consistent with hollow viscus.     Pt was admitted under Acute Care Surgery for further evaluation and management. Pt was taken to the OR on 8/11/24, and is s/p Exploratory laparotomy, lysis of adhesions of omentum, and Kash patch for hollow viscous perforation. Intra-operatively, pt was noted to have a pinhole perforation of the anterior pylorus with spillage of bile, treated with a Kash patch using the falciform ligament, left with two drains (#1 in the lesser sac due to presence of bilious succus prior to washout, #2 overlying the repair), skin closed in interrupted fashion with a Prevena wound vac.   SICU was consulted post-operatively for close hemodynamic monitoring, and accepted the patient. On POC, the patient was recovering appropriately.   On POD #1, pt was stable and doing well. Pt was started on PPI BID for ulcer ppx. Pt was kept NPO for 5 days (8/11-8/16) post op. The patient was transferred to the surgical floor in stable condition on 8/13. Upper GI series (8/16/24) negative for leak, and diet was advanced as tolerated. Once IV pain control dosing complete, pt was transitioned to oral Tylenol with Oxycodone for breakthrough pain.   Ambulation and incentive spirometry encouraged. Labs were monitored daily, and electrolytes were repleted as necessary.     Nutrition support services team was consulted for TPN, which was started on 8/12/24. Pt will require 2 weeks of TPN at home    Cardiology was consulted for cardiac co-management; recommendations were followed    Physical therapy evaluated the patient and recommended home PT    Pt will be discharged with home care services for TPN    On the day of discharge, the patient's vital signs are within normal limits, pain is controlled, voiding urine, passing gas/stool, tolerating a PO diet, and ambulating well. Pt will f/u with Dr. Akhtar in 1-2 weeks. Pt will f/u with PCP in 1-2 weeks. 80 y/o Male with PMHx of rectal CA s/p APR in 2010 by Dr. 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 Patient denies nausea or vomiting. 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 No fevers/chills, chest pain/shortness of breath, or dizziness/lightheadedness.  In the ED, patient was found to be afebrile, hemodynamically stable, labs were largely unremarkable. CT Abd/Pelvis with IV contrast (8/11/24) revealed pneumoperitoneum with concerns for hollow viscus. Repeat CT Abd/Pelvis with PO contrast (8/11/24) revealed slight oral contrast extravasation, consistent with hollow viscus.     Pt was admitted under Acute Care Surgery for further evaluation and management. Pt was taken to the OR on 8/11/24, and is s/p Exploratory laparotomy, lysis of adhesions of omentum, and Kash patch for hollow viscous perforation. Intra-operatively, pt was noted to have a pinhole perforation of the anterior pylorus with spillage of bile, treated with a Kash patch using the falciform ligament, left with two drains (#1 in the lesser sac due to presence of bilious succus prior to washout, #2 overlying the repair), skin closed in interrupted fashion with a Prevena wound vac.   SICU was consulted post-operatively for close hemodynamic monitoring, and accepted the patient. On POC, the patient was recovering appropriately.   On POD #1, pt was stable and doing well. Pt was started on PPI BID for ulcer ppx. Pt was kept NPO for 5 days (8/11-8/16) post op. The patient was transferred to the surgical floor in stable condition on 8/13. Upper GI series (8/16/24) negative for leak, and diet was advanced as tolerated. Once IV pain control dosing complete, pt was transitioned to oral Tylenol with Oxycodone for breakthrough pain.   Ambulation and incentive spirometry encouraged. Labs were monitored daily, and electrolytes were repleted as necessary.     Nutrition support services team was consulted for TPN, which was started on 8/12/24. Pt will require 2 weeks of TPN at home    Cardiology was consulted for cardiac co-management; recommendations were followed    Physical therapy evaluated the patient and recommended home PT.  UGI done and showed no leak, diet advanced as tolerated.    Pt will be discharged with home care services for TPN    On the day of discharge, the patient's vital signs are within normal limits, pain is controlled, voiding urine, passing gas/stool, tolerating a PO diet, and ambulating well. Pt will f/u with Dr. Akhtar in 1-2 weeks. Pt will f/u with PCP in 1-2 weeks. 80 y/o Male with PMHx of rectal CA s/p APR in 2010 by Dr. 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 Patient denies nausea or vomiting. 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 No fevers/chills, chest pain/shortness of breath, or dizziness/lightheadedness.  In the ED, patient was found to be afebrile, hemodynamically stable, labs were largely unremarkable. CT Abd/Pelvis with IV contrast (8/11/24) revealed pneumoperitoneum with concerns for hollow viscus. Repeat CT Abd/Pelvis with PO contrast (8/11/24) revealed slight oral contrast extravasation, consistent with hollow viscus.     Pt was admitted under Acute Care Surgery for further evaluation and management. Pt was taken to the OR on 8/11/24, and is s/p Exploratory laparotomy, lysis of adhesions of omentum, and Kash patch for hollow viscous perforation. Intra-operatively, pt was noted to have a pinhole perforation of the anterior pylorus with spillage of bile, treated with a Kash patch using the falciform ligament, left with two drains (#1 in the lesser sac due to presence of bilious succus prior to washout, #2 overlying the repair), skin closed in interrupted fashion with a Prevena wound vac.   SICU was consulted post-operatively for close hemodynamic monitoring, and accepted the patient. On POC, the patient was recovering appropriately.   On POD #1, pt was stable and doing well. Pt was started on PPI BID for ulcer ppx. Pt was kept NPO for 5 days (8/11-8/16) post op. The patient was transferred to the surgical floor in stable condition on 8/13. Upper GI series (8/16/24) negative for leak, and diet was advanced as tolerated. Once IV pain control dosing complete, pt was transitioned to oral Tylenol with Oxycodone for breakthrough pain.   Ambulation and incentive spirometry encouraged. Labs were monitored daily, and electrolytes were repleted as necessary.     Nutrition support services team was consulted for TPN, which was started on 8/12/24. Pt will require 2 weeks of TPN at home    Cardiology was consulted for cardiac co-management; recommendations were followed    Physical therapy evaluated the patient and recommended  home PT.  UGI done and showed no leak, diet advanced as tolerated.    Pt will be discharged with home care services for TPN    On the day of discharge, the patient's vital signs are within normal limits, pain is controlled, voiding urine, passing gas/stool, tolerating a PO diet, and ambulating well. Pt will f/u with Dr. Akhtar in 1-2 weeks. Pt will f/u with PCP in 1-2 weeks.

## 2024-08-16 NOTE — PROGRESS NOTE ADULT - SUBJECTIVE AND OBJECTIVE BOX
SURGERY DAILY PROGRESS NOTE:       Subjective / Overnight events:  NGT repositioned overnight, CXR performed confirmed correct position.  Feels ok, denies N/V.   Pain controlled with medications. Wants to go home      Objective:      MEDICATIONS  (STANDING):  chlorhexidine 2% Cloths 1 Application(s) Topical <User Schedule>  enoxaparin Injectable 40 milliGRAM(s) SubCutaneous every 24 hours  fluconAZOLE IVPB 400 milliGRAM(s) IV Intermittent every 24 hours  fluconAZOLE IVPB      insulin lispro (ADMELOG) corrective regimen sliding scale   SubCutaneous every 6 hours  lipid, fat emulsion (Fish Oil and Plant Based) 20% Infusion 29.2 mL/Hr (29.2 mL/Hr) IV Continuous <Continuous>  pantoprazole  Injectable 40 milliGRAM(s) IV Push two times a day  Parenteral Nutrition - Adult 1 Each (100 mL/Hr) TPN Continuous <Continuous>  piperacillin/tazobactam IVPB.. 3.375 Gram(s) IV Intermittent every 8 hours    MEDICATIONS  (PRN):  benzocaine 20% Spray 1 Spray(s) Topical every 6 hours PRN Sore throat  HYDROmorphone  Injectable 0.25 milliGRAM(s) IV Push every 3 hours PRN Moderate Pain (4 - 6)  HYDROmorphone  Injectable 0.5 milliGRAM(s) IV Push every 3 hours PRN Severe Pain (7 - 10)      Vital Signs Last 24 Hrs  T(C): 37.1 (16 Aug 2024 08:31), Max: 37.5 (15 Aug 2024 16:25)  T(F): 98.7 (16 Aug 2024 08:31), Max: 99.5 (15 Aug 2024 16:25)  HR: 68 (16 Aug 2024 08:31) (58 - 81)  BP: 132/83 (16 Aug 2024 08:31) (110/77 - 155/84)  BP(mean): --  ABP: --  ABP(mean): --  RR: 18 (16 Aug 2024 08:31) (18 - 18)  SpO2: 98% (16 Aug 2024 08:31) (96% - 98%)    O2 Parameters below as of 16 Aug 2024 08:31  Patient On (Oxygen Delivery Method): room air    I&O's Detail    15 Aug 2024 07:01  -  16 Aug 2024 07:00  --------------------------------------------------------  IN:  Total IN: 0 mL    OUT:    Bulb (mL): 35 mL    Bulb (mL): 25 mL    Colostomy (mL): 0 mL    Nasogastric/Oral tube (mL): 450 mL    Oral Fluid: 0 mL    Voided (mL): 350 mL    Voided (mL): 1650 mL  Total OUT: 2510 mL    Total NET: -2510 mL        LABS:                        10.7   4.13  )-----------( 138      ( 16 Aug 2024 07:47 )             31.6     08-16    137  |  103  |  33<H>  ----------------------------<  122<H>  4.3   |  22  |  0.82    Ca    9.0      16 Aug 2024 07:47  Phos  3.5     08-16  Mg     1.8     08-16    TPro  6.0  /  Alb  3.0<L>  /  TBili  0.5  /  DBili  x   /  AST  55<H>  /  ALT  33  /  AlkPhos  83  08-16        Urinalysis Basic - ( 14 Aug 2024 06:39 )  Urinalysis Basic - ( 16 Aug 2024 07:47 )    Color: x / Appearance: x / SG: x / pH: x  Gluc: 122 mg/dL / Ketone: x  / Bili: x / Urobili: x   Blood: x / Protein: x / Nitrite: x   Leuk Esterase: x / RBC: x / WBC x   Sq Epi: x / Non Sq Epi: x / Bacteria: x    Physical Exam:  General Appearance: Appears well, NAD, +NGT  Neck: Supple  Chest: Equal expansion bilaterally, equal breath sounds  CV: Pulse regular presently  Abdomen: Soft, nontender, prevena vac in place, 2 left BELL drains SS,  appropriate incisional tenderness, dressings clean and dry and intact  Extremities: Grossly symmetric, SCD's in place     BELL #1 25cc/24h  BELL #2 35cc/24h  Provena taken down this AM, incision CDI   cc/24h

## 2024-08-16 NOTE — DISCHARGE NOTE PROVIDER - PROVIDER TOKENS
[FreeTextEntry3] : All medical record entries made by the Scribe were at my, MOO Schultz, discretion and personally dictated by me on 08/28/2020. I have reviewed the chart and agree that the record accurately reflects my personal performance of the history, physical exam, assessment and plan. I have also personally directed, reviewed and agreed to the chart.  PROVIDER:[TOKEN:[[id number]],FOLLOWUP:[1 week]] PROVIDER:[TOKEN:[[id number]],FOLLOWUP:[1 week]],PROVIDER:[TOKEN:[[id number]],FOLLOWUP:[1 week]]

## 2024-08-16 NOTE — DISCHARGE NOTE PROVIDER - DETAILS OF MALNUTRITION DIAGNOSIS/DIAGNOSES
This patient has been assessed with a concern for Malnutrition and was treated during this hospitalization for the following Nutrition diagnosis/diagnoses:     -  08/12/2024: Severe protein-calorie malnutrition

## 2024-08-16 NOTE — DISCHARGE NOTE PROVIDER - NSDCCPTREATMENT_GEN_ALL_CORE_FT
PRINCIPAL PROCEDURE  Procedure: Repair of perforated pyloric ulcer using Kash patch  Findings and Treatment:

## 2024-08-16 NOTE — DISCHARGE NOTE PROVIDER - CARE PROVIDER_API CALL
[facility]  [address] [place] [address], [facility] [address]  [place] [address]  Phone: [phone]  Fax: [phone]  Follow Up Time: 1 week   Glenn Akhtar [facility]  Surgery  [address] [place] [address], [address]  [place] [address]  Phone: [phone]  Fax: [phone]  Follow Up Time: 1 week    David Umaña  Nephrology  [address] [place] [address], [address]  [place] [address]  Phone: [phone]  Fax: [phone]  Follow Up Time: 1 week

## 2024-08-16 NOTE — PROGRESS NOTE ADULT - SUBJECTIVE AND OBJECTIVE BOX
[facility] NUTRITION SUPPORT-- FOLLOW UP NOTE      24 hour events/subjective:      Diet:  Diet, NPO (08-11-24 @ 19:31)      PAST HISTORY  --------------------------------------------------------------------------------  No significant changes to PMH, PSH, FHx, SHx, unless otherwise noted    ALLERGIES & MEDICATIONS  --------------------------------------------------------------------------------  Allergies    No Known Allergies    Intolerances      Standing Inpatient Medications  chlorhexidine 2% Cloths 1 Application(s) Topical <User Schedule>  enoxaparin Injectable 40 milliGRAM(s) SubCutaneous every 24 hours  fluconAZOLE IVPB      fluconAZOLE IVPB 400 milliGRAM(s) IV Intermittent every 24 hours  insulin lispro (ADMELOG) corrective regimen sliding scale   SubCutaneous every 6 hours  lipid, fat emulsion (Fish Oil and Plant Based) 20% Infusion 29.2 mL/Hr IV Continuous <Continuous>  pantoprazole  Injectable 40 milliGRAM(s) IV Push two times a day  Parenteral Nutrition - Adult 1 Each TPN Continuous <Continuous>  piperacillin/tazobactam IVPB.. 3.375 Gram(s) IV Intermittent every 8 hours    PRN Inpatient Medications  benzocaine 20% Spray 1 Spray(s) Topical every 6 hours PRN  HYDROmorphone  Injectable 0.25 milliGRAM(s) IV Push every 3 hours PRN  HYDROmorphone  Injectable 0.5 milliGRAM(s) IV Push every 3 hours PRN      REVIEW OF SYSTEMS  --------------------------------------------------------------------------------  Gen: as per HPI  Skin: No rashes  Head/Eyes/Ears/Mouth: No headache; No sore throat  Respiratory: No dyspnea, cough,   CV: No chest pain, PND, orthopnea  GI: as per HPI  : No increased frequency, dysuria, hematuria, nocturia  MSK: No joint pain/swelling; no back pain; no edema  Neuro: No dizziness/lightheadedness, weakness, seizures, numbness, tingling  Psych: No significant nervousness, anxiety, stress, depression    All other systems were reviewed and are negative, except as noted.        LABS/STUDIES  --------------------------------------------------------------------------------              10.7   4.13  >-----------<  138      [08-16-24 @ 07:47]              31.6     137  |  103  |  33  ----------------------------<  122      [08-16-24 @ 07:47]  4.3   |  22  |  0.82        Ca     9.0     [08-16-24 @ 07:47]      iCa    1.22     [08-16 @ 07:47]      Mg     1.8     [08-16-24 @ 07:47]      Phos  3.5     [08-16-24 @ 07:47]    TPro  6.0  /  Alb  3.0  /  TBili  0.5  /  DBili  x   /  AST  55  /  ALT  33  /  AlkPhos  83  [08-16-24 @ 07:47]              Glucose: 122 mg/dL (08-16-24 @ 07:47)    Prealbumin, Serum: 11 mg/dL (08-12-24 @ 16:28)    Triglycerides      CAPILLARY BLOOD GLUCOSE      POCT Blood Glucose.: 136 mg/dL (16 Aug 2024 05:24)  POCT Blood Glucose.: 123 mg/dL (15 Aug 2024 23:33)  POCT Blood Glucose.: 115 mg/dL (15 Aug 2024 17:12)  POCT Blood Glucose.: 136 mg/dL (15 Aug 2024 13:00)      VITALS/PHYSICAL EXAM  --------------------------------------------------------------------------------  T(C): 37.1 (08-16-24 @ 08:31), Max: 37.5 (08-15-24 @ 16:25)  HR: 68 (08-16-24 @ 08:31) (58 - 81)  BP: 132/83 (08-16-24 @ 08:31) (110/77 - 155/84)  RR: 18 (08-16-24 @ 08:31) (18 - 18)  SpO2: 98% (08-16-24 @ 08:31) (96% - 98%)  Wt(kg): --        08-15-24 @ 07:01  -  08-16-24 @ 07:00  --------------------------------------------------------  IN: 0 mL / OUT: 2510 mL / NET: -2510 mL    08-16-24 @ 07:01  -  08-16-24 @ 09:27  --------------------------------------------------------  IN: 0 mL / OUT: 400 mL / NET: -400 mL      Physical Exam:    .   [facility] NUTRITION SUPPORT-- FOLLOW UP NOTE      24 hour events/subjective:    Pt continues on TPN for nutritional support. Pt is asymptomatic for CP, SOB, fever, chills nor night sweats.      Diet:  Diet, NPO (08-11-24 @ 19:31)      PAST HISTORY  --------------------------------------------------------------------------------  No significant changes to PMH, PSH, FHx, SHx, unless otherwise noted    ALLERGIES & MEDICATIONS  --------------------------------------------------------------------------------  Allergies    No Known Allergies    Intolerances      Standing Inpatient Medications  chlorhexidine 2% Cloths 1 Application(s) Topical <User Schedule>  enoxaparin Injectable 40 milliGRAM(s) SubCutaneous every 24 hours  fluconAZOLE IVPB      fluconAZOLE IVPB 400 milliGRAM(s) IV Intermittent every 24 hours  insulin lispro (ADMELOG) corrective regimen sliding scale   SubCutaneous every 6 hours  lipid, fat emulsion (Fish Oil and Plant Based) 20% Infusion 29.2 mL/Hr IV Continuous <Continuous>  pantoprazole  Injectable 40 milliGRAM(s) IV Push two times a day  Parenteral Nutrition - Adult 1 Each TPN Continuous <Continuous>  piperacillin/tazobactam IVPB.. 3.375 Gram(s) IV Intermittent every 8 hours    PRN Inpatient Medications  benzocaine 20% Spray 1 Spray(s) Topical every 6 hours PRN  HYDROmorphone  Injectable 0.25 milliGRAM(s) IV Push every 3 hours PRN  HYDROmorphone  Injectable 0.5 milliGRAM(s) IV Push every 3 hours PRN      REVIEW OF SYSTEMS  --------------------------------------------------------------------------------  Gen: as per HPI  Skin: No rashes  Head/Eyes/Ears/Mouth: No headache; No sore throat  Respiratory: No dyspnea, cough,   CV: No chest pain, PND, orthopnea  GI: as per HPI  : No increased frequency, dysuria, hematuria, nocturia  MSK: No joint pain/swelling; no back pain; no edema  Neuro: No dizziness/lightheadedness, weakness, seizures, numbness, tingling  Psych: No significant nervousness, anxiety, stress, depression    All other systems were reviewed and are negative, except as noted.        LABS/STUDIES  --------------------------------------------------------------------------------              10.7   4.13  >-----------<  138      [08-16-24 @ 07:47]              31.6     137  |  103  |  33  ----------------------------<  122      [08-16-24 @ 07:47]  4.3   |  22  |  0.82        Ca     9.0     [08-16-24 @ 07:47]      iCa    1.22     [08-16 @ 07:47]      Mg     1.8     [08-16-24 @ 07:47]      Phos  3.5     [08-16-24 @ 07:47]    TPro  6.0  /  Alb  3.0  /  TBili  0.5  /  DBili  x   /  AST  55  /  ALT  33  /  AlkPhos  83  [08-16-24 @ 07:47]              Glucose: 122 mg/dL (08-16-24 @ 07:47)    Prealbumin, Serum: 11 mg/dL (08-12-24 @ 16:28)    Triglycerides      CAPILLARY BLOOD GLUCOSE      POCT Blood Glucose.: 136 mg/dL (16 Aug 2024 05:24)  POCT Blood Glucose.: 123 mg/dL (15 Aug 2024 23:33)  POCT Blood Glucose.: 115 mg/dL (15 Aug 2024 17:12)  POCT Blood Glucose.: 136 mg/dL (15 Aug 2024 13:00)      VITALS/PHYSICAL EXAM  --------------------------------------------------------------------------------  T(C): 37.1 (08-16-24 @ 08:31), Max: 37.5 (08-15-24 @ 16:25)  HR: 68 (08-16-24 @ 08:31) (58 - 81)  BP: 132/83 (08-16-24 @ 08:31) (110/77 - 155/84)  RR: 18 (08-16-24 @ 08:31) (18 - 18)  SpO2: 98% (08-16-24 @ 08:31) (96% - 98%)  Wt(kg): --        08-15-24 @ 07:01  -  08-16-24 @ 07:00  --------------------------------------------------------  IN: 0 mL / OUT: 2510 mL / NET: -2510 mL    08-16-24 @ 07:01  -  08-16-24 @ 09:27  --------------------------------------------------------  IN: 0 mL / OUT: 400 mL / NET: -400 mL            Physical Exam:        Gen: NAD, frail appearing  HEENT: supple neck, no JVD; +NGT; Neck discoloration/darkening  Chest: non labored breathing, equal chest expansion bilaterally  Abd:  Soft, nontender, prevena vac in place, 2 left BELL drains SS, appropriate incisional tenderness, dressings c/d/i  : No suprapubic tenderness  Ext: Warm, FROM, no edema b/l LE  Neuro: No focal deficits  Psych: Normal affect and mood  Skin: Warm, without rashes     .  .  .  .    .   [facility] NUTRITION SUPPORT-- FOLLOW UP NOTE      24 hour events/subjective:    Pt continues on TPN for nutritional support. Pt is asymptomatic for CP, SOB, fever, chills nor night sweats.      Diet:  Diet, NPO (08-11-24 @ 19:31)      PAST HISTORY  --------------------------------------------------------------------------------  No significant changes to PMH, PSH, FHx, SHx, unless otherwise noted    ALLERGIES & MEDICATIONS  --------------------------------------------------------------------------------  Allergies    No Known Allergies    Intolerances      Standing Inpatient Medications  chlorhexidine 2% Cloths 1 Application(s) Topical <User Schedule>  enoxaparin Injectable 40 milliGRAM(s) SubCutaneous every 24 hours  fluconAZOLE IVPB      fluconAZOLE IVPB 400 milliGRAM(s) IV Intermittent every 24 hours  insulin lispro (ADMELOG) corrective regimen sliding scale   SubCutaneous every 6 hours  lipid, fat emulsion (Fish Oil and Plant Based) 20% Infusion 29.2 mL/Hr IV Continuous <Continuous>  pantoprazole  Injectable 40 milliGRAM(s) IV Push two times a day  Parenteral Nutrition - Adult 1 Each TPN Continuous <Continuous>  piperacillin/tazobactam IVPB.. 3.375 Gram(s) IV Intermittent every 8 hours    PRN Inpatient Medications  benzocaine 20% Spray 1 Spray(s) Topical every 6 hours PRN  HYDROmorphone  Injectable 0.25 milliGRAM(s) IV Push every 3 hours PRN  HYDROmorphone  Injectable 0.5 milliGRAM(s) IV Push every 3 hours PRN      REVIEW OF SYSTEMS  --------------------------------------------------------------------------------  Gen: as per HPI  Skin: No rashes  Head/Eyes/Ears/Mouth: No headache; No sore throat  Respiratory: No dyspnea, cough,   CV: No chest pain, PND, orthopnea  GI: as per HPI  : No increased frequency, dysuria, hematuria, nocturia  MSK: No joint pain/swelling; no back pain; no edema  Neuro: No dizziness/lightheadedness, weakness, seizures, numbness, tingling  Psych: No significant nervousness, anxiety, stress, depression    All other systems were reviewed and are negative, except as noted.        LABS/STUDIES  --------------------------------------------------------------------------------              10.7   4.13  >-----------<  138      [08-16-24 @ 07:47]              31.6     137  |  103  |  33  ----------------------------<  122      [08-16-24 @ 07:47]  4.3   |  22  |  0.82        Ca     9.0     [08-16-24 @ 07:47]      iCa    1.22     [08-16 @ 07:47]      Mg     1.8     [08-16-24 @ 07:47]      Phos  3.5     [08-16-24 @ 07:47]    TPro  6.0  /  Alb  3.0  /  TBili  0.5  /  DBili  x   /  AST  55  /  ALT  33  /  AlkPhos  83  [08-16-24 @ 07:47]              Glucose: 122 mg/dL (08-16-24 @ 07:47)    Prealbumin, Serum: 11 mg/dL (08-12-24 @ 16:28)    Triglycerides      CAPILLARY BLOOD GLUCOSE      POCT Blood Glucose.: 136 mg/dL (16 Aug 2024 05:24)  POCT Blood Glucose.: 123 mg/dL (15 Aug 2024 23:33)  POCT Blood Glucose.: 115 mg/dL (15 Aug 2024 17:12)  POCT Blood Glucose.: 136 mg/dL (15 Aug 2024 13:00)      VITALS/PHYSICAL EXAM  --------------------------------------------------------------------------------  T(C): 37.1 (08-16-24 @ 08:31), Max: 37.5 (08-15-24 @ 16:25)  HR: 68 (08-16-24 @ 08:31) (58 - 81)  BP: 132/83 (08-16-24 @ 08:31) (110/77 - 155/84)  RR: 18 (08-16-24 @ 08:31) (18 - 18)  SpO2: 98% (08-16-24 @ 08:31) (96% - 98%)  Wt(kg): --        08-15-24 @ 07:01  -  08-16-24 @ 07:00  --------------------------------------------------------  IN: 0 mL / OUT: 2510 mL / NET: -2510 mL    08-16-24 @ 07:01  -  08-16-24 @ 09:27  --------------------------------------------------------  IN: 0 mL / OUT: 400 mL / NET: -400 mL            Physical Exam:        Gen: NAD, frail appearing  HEENT: NCAT PERRL +NGT;   Neck discoloration/darkening, supple neck, no JVD  Chest: non labored breathing, equal chest expansion bilaterally  Abd:  Soft, nontender, Prevena vac in place, 2 left BELL drains SS, appropriate incisional tenderness, dressings c/d/i  : No suprapubic tenderness  Ext: Warm, FROM, no edema b/l LE  Neuro: No focal deficits  Psych: Normal affect and mood  Skin: Warm, without rashes     .  .  .  .    .

## 2024-08-16 NOTE — DISCHARGE NOTE PROVIDER - NSDCMRMEDTOKEN_GEN_ALL_CORE_FT
metoclopramide 10 mg oral tablet: 1 tab(s) orally every 6 hours as needed for  nausea take 1 tablet every 6 hours AS NEEDED for nausea  NIFEdipine 30 mg oral tablet, extended release: 1 tab(s) orally once a day  Rolling Walker: Use as needed for ambulation   acetaminophen 325 mg oral tablet: 3 tab(s) orally every 6 hours  fat emulsion with fish, medium chain, olive, and soy oil 20% intravenous emulsion: intravenous once a day  intravenous electrolyte (Lypholyte II/Nutrilyte II/TPN Electrolytes) solution: 1 each intravenous once a day  metoclopramide 10 mg oral tablet: 1 tab(s) orally every 6 hours as needed for  nausea take 1 tablet every 6 hours AS NEEDED for nausea  NIFEdipine 30 mg oral tablet, extended release: 1 tab(s) orally once a day  oxyCODONE 5 mg oral tablet: 1 tab(s) orally every 6 hours as needed for Severe Pain (7 - 10) MDD: 4  pantoprazole 40 mg oral delayed release tablet: 1 tab(s) orally 2 times a day

## 2024-08-16 NOTE — DISCHARGE NOTE PROVIDER - NSDCFUSCHEDAPPT_GEN_ALL_CORE_FT
Dhiraj Camarillo  [facility] Physician [facility]  OTOLARYNG [address] [facility]  Scheduled Appointment: 08/21/2024    Jaqueline Monson  [facility]  RADMED [address] [facility]  Scheduled Appointment: 09/18/2024    Shan Ashley  [facility]  INTMED [address] [place] [address]  Scheduled Appointment: 10/15/2024     Jaqueline Monson  [facility]  RADMED [address] [facility]  Scheduled Appointment: 09/18/2024    Shan Ashley  [facility] Physician [facility]  INTMED [address] [facility]  Scheduled Appointment: 10/15/2024

## 2024-08-16 NOTE — DISCHARGE NOTE PROVIDER - NSDCCPCAREPLAN_GEN_ALL_CORE_FT
PRINCIPAL DISCHARGE DIAGNOSIS  Diagnosis: Pneumoperitoneum  Assessment and Plan of Treatment: WOUND CARE: Keep incision clean and dry.  Follow-up in office for staple removal.  BATHING: Please do not submerge wound underwater. You may shower and/or sponge bathe.  ACTIVITY: No heavy lifting or straining. Otherwise, you may return to your usual level of physical activity. If you are taking narcotic pain medication (such as Percocet), do NOT drive a car, operate machinery or make important decisions.  DIET: Return to your usual diet.  NOTIFY YOUR SURGEON IF: You have any bleeding that does not stop, any pus draining from your wound, any fever (over 100.4 F) or chills, persistent nausea/vomiting, persistent diarrhea, or if your pain is not controlled on your discharge pain medications.  FOLLOW-UP:  1. Follow-up with Dr. Akhtar or one of her partners: Dr. Craft, Dr. Lara, Dr. Isabel, Dr. Cunningham, Dr. Buckley, Dr. Escobar, Dr. Salcedo or Dr. Sylvester.  Please call [phone] to schedule an appointment in 2-4 weeks of discharge.  Please call office for appointment  2. Please follow up with your primary care physician in one week regarding your hospitalization.     PRINCIPAL DISCHARGE DIAGNOSIS  Diagnosis: Pneumoperitoneum  Assessment and Plan of Treatment: WOUND CARE: Keep incision clean and dry.  Follow-up in office for staple removal.  BATHING: Please do not submerge wound underwater. You may shower and/or sponge bathe.  ACTIVITY: No heavy lifting or straining. Otherwise, you may return to your usual level of physical activity. If you are taking narcotic pain medication (such as Percocet), do NOT drive a car, operate machinery or make important decisions.  DIET: Return to your usual diet.  NOTIFY YOUR SURGEON IF: You have any bleeding that does not stop, any pus draining from your wound, any fever (over 100.4 F) or chills, persistent nausea/vomiting, persistent diarrhea, or if your pain is not controlled on your discharge pain medications.  FOLLOW-UP:  1. Follow-up with Dr. Akhtar or one of her partners: Dr. Craft, Dr. Lara, Dr. Isabel, Dr. Cunningham, Dr. Buckley, Dr. Escobar, Dr. Salcedo or Dr. Sylvester.  Please call [phone] to schedule an appointment in 2-4 weeks of discharge.  Please call office for appointment  2. Please follow up with your primary care physician in one week regarding your hospitalization.  You will be discharged with BELL drains. You will need to empty them and record outputs accurately. This will be taught to you by the nursing staff. Please do not remove the BELL drains. They will be removed in the office. Please bring to the office accurate records of output.     PRINCIPAL DISCHARGE DIAGNOSIS  Diagnosis: Pneumoperitoneum  Assessment and Plan of Treatment: WOUND CARE: Keep incision clean and dry.  Follow-up in office for staple removal.  BATHING: Please do not submerge wound underwater. You may shower and/or sponge bathe.  ACTIVITY: No heavy lifting or straining. Otherwise, you may return to your usual level of physical activity. If you are taking narcotic pain medication (such as Percocet), do NOT drive a car, operate machinery or make important decisions.  DIET: Return to your usual diet.  NOTIFY YOUR SURGEON IF: You have any bleeding that does not stop, any pus draining from your wound, any fever (over 100.4 F) or chills, persistent nausea/vomiting, persistent diarrhea, or if your pain is not controlled on your discharge pain medications.  FOLLOW-UP:  1. Follow-up with Dr. Akhtar or one of her partners: Dr. Craft, Dr. Lara, Dr. Isabel, Dr. Cunningham, Dr. Buckley, Dr. Escobar, Dr. Salcedo or Dr. Sylvester.  Please call [phone] to schedule an appointment in 2-4 weeks of discharge.  Please call office for appointment  2. Please follow up with your primary care physician in one week regarding your hospitalization.  You will be discharged with BELL drains. You will need to empty them and record outputs accurately. This will be taught to you by the nursing staff. Please do not remove the BELL drains. They will be removed in the office. Please bring to the office accurate records of output.  for TPN:  Outpatient follow up with Dr David Umaña, [phone]; [address] [place]; [address] [facility]  Call to make appointment for the first MONDAY after discharge from hospital.  Say you're on TPN when making the appointment.

## 2024-08-16 NOTE — PROGRESS NOTE ADULT - SUBJECTIVE AND OBJECTIVE BOX
Subjective: Patient seen and examined. No new events except as noted.     SUBJECTIVE/ROS:  nad      MEDICATIONS:  MEDICATIONS  (STANDING):  chlorhexidine 2% Cloths 1 Application(s) Topical <User Schedule>  enoxaparin Injectable 40 milliGRAM(s) SubCutaneous every 24 hours  fluconAZOLE IVPB 400 milliGRAM(s) IV Intermittent every 24 hours  fluconAZOLE IVPB      insulin lispro (ADMELOG) corrective regimen sliding scale   SubCutaneous every 6 hours  lipid, fat emulsion (Fish Oil and Plant Based) 20% Infusion 29.2 mL/Hr (29.2 mL/Hr) IV Continuous <Continuous>  pantoprazole  Injectable 40 milliGRAM(s) IV Push two times a day  Parenteral Nutrition - Adult 1 Each TPN Continuous <Continuous>  piperacillin/tazobactam IVPB.. 3.375 Gram(s) IV Intermittent every 8 hours      PHYSICAL EXAM:  T(C): 37.1 (08-16-24 @ 08:31), Max: 37.5 (08-15-24 @ 16:25)  HR: 68 (08-16-24 @ 08:31) (58 - 81)  BP: 132/83 (08-16-24 @ 08:31) (110/77 - 155/84)  RR: 18 (08-16-24 @ 08:31) (18 - 18)  SpO2: 98% (08-16-24 @ 08:31) (96% - 98%)  Wt(kg): --  I&O's Summary    15 Aug 2024 07:01  -  16 Aug 2024 07:00  --------------------------------------------------------  IN: 0 mL / OUT: 2510 mL / NET: -2510 mL    16 Aug 2024 07:01  -  16 Aug 2024 09:10  --------------------------------------------------------  IN: 0 mL / OUT: 400 mL / NET: -400 mL            JVP: Normal  Neck: supple  Lung: clear   CV: S1 S2 , Murmur:  Abd: soft  Ext: No edema  neuro: Awake / alert  Psych: flat affect  Skin: normal``    LABS/DATA:    CARDIAC MARKERS:                                10.7   4.13  )-----------( 138      ( 16 Aug 2024 07:47 )             31.6     08-16    137  |  103  |  33<H>  ----------------------------<  122<H>  4.3   |  22  |  0.82    Ca    9.0      16 Aug 2024 07:47  Phos  3.5     08-16  Mg     1.8     08-16    TPro  6.0  /  Alb  3.0<L>  /  TBili  0.5  /  DBili  x   /  AST  55<H>  /  ALT  33  /  AlkPhos  83  08-16    proBNP:   Lipid Profile:   HgA1c:   TSH:     TELE:  EKG:

## 2024-08-16 NOTE — DISCHARGE NOTE PROVIDER - NSDCFUADDAPPT_GEN_ALL_CORE_FT
Please follow up with your primary care provider in 1-2 weeks after discharge from the hospital   Please follow up with your primary care provider in 1-2 weeks after discharge from the hospital    You will be discharged with BELL drains. You will need to empty them and record outputs accurately. This will be taught to you by the nursing staff. Please do not remove the BELL drains. They will be removed in the office. Please bring to the office accurate records of output.  Please follow up with your primary care provider in 1-2 weeks after discharge from the hospital    You will be discharged with BELL drains. You will need to empty them and record outputs accurately. This will be taught to you by the nursing staff. Please do not remove the BELL drains. They will be removed in the office. Please bring to the office accurate records of output.     Regarding your TPN:  Outpatient follow up with Dr David Umaña, [phone]; [address] [facility]; [address] [facility]  Call to make appointment for the first MONDAY after discharge from hospital.  Say you're on TPN when making the appointment.

## 2024-08-16 NOTE — DISCHARGE NOTE PROVIDER - CARE PROVIDERS DIRECT ADDRESSES
[email or web address]

## 2024-08-17 ENCOUNTER — APPOINTMENT (OUTPATIENT)
Dept: INFUSION THERAPY | Facility: HOSPITAL | Age: 79
End: 2024-08-17

## 2024-08-17 LAB
ALBUMIN SERPL ELPH-MCNC: 3 G/DL — LOW (ref 3.3–5)
ALP SERPL-CCNC: 94 U/L — SIGNIFICANT CHANGE UP (ref 40–120)
ALT FLD-CCNC: 40 U/L — SIGNIFICANT CHANGE UP (ref 10–45)
ANION GAP SERPL CALC-SCNC: 13 MMOL/L — SIGNIFICANT CHANGE UP (ref 5–17)
AST SERPL-CCNC: 52 U/L — HIGH (ref 10–40)
BILIRUB SERPL-MCNC: 0.6 MG/DL — SIGNIFICANT CHANGE UP (ref 0.2–1.2)
BUN SERPL-MCNC: 36 MG/DL — HIGH (ref 7–23)
CA-I BLD-SCNC: 1.23 MMOL/L — SIGNIFICANT CHANGE UP (ref 1.15–1.33)
CALCIUM SERPL-MCNC: 9.1 MG/DL — SIGNIFICANT CHANGE UP (ref 8.4–10.5)
CHLORIDE SERPL-SCNC: 103 MMOL/L — SIGNIFICANT CHANGE UP (ref 96–108)
CO2 SERPL-SCNC: 20 MMOL/L — LOW (ref 22–31)
CREAT SERPL-MCNC: 0.83 MG/DL — SIGNIFICANT CHANGE UP (ref 0.5–1.3)
CULTURE RESULTS: ABNORMAL
EGFR: 90 ML/MIN/1.73M2 — SIGNIFICANT CHANGE UP
GLUCOSE BLDC GLUCOMTR-MCNC: 132 MG/DL — HIGH (ref 70–99)
GLUCOSE BLDC GLUCOMTR-MCNC: 146 MG/DL — HIGH (ref 70–99)
GLUCOSE BLDC GLUCOMTR-MCNC: 154 MG/DL — HIGH (ref 70–99)
GLUCOSE SERPL-MCNC: 110 MG/DL — HIGH (ref 70–99)
HCT VFR BLD CALC: 30.9 % — LOW (ref 39–50)
HGB BLD-MCNC: 10.4 G/DL — LOW (ref 13–17)
MAGNESIUM SERPL-MCNC: 2 MG/DL — SIGNIFICANT CHANGE UP (ref 1.6–2.6)
MCHC RBC-ENTMCNC: 31.1 PG — SIGNIFICANT CHANGE UP (ref 27–34)
MCHC RBC-ENTMCNC: 33.7 GM/DL — SIGNIFICANT CHANGE UP (ref 32–36)
MCV RBC AUTO: 92.5 FL — SIGNIFICANT CHANGE UP (ref 80–100)
NRBC # BLD: 0 /100 WBCS — SIGNIFICANT CHANGE UP (ref 0–0)
ORGANISM # SPEC MICROSCOPIC CNT: ABNORMAL
PHOSPHATE SERPL-MCNC: 3.5 MG/DL — SIGNIFICANT CHANGE UP (ref 2.5–4.5)
PLATELET # BLD AUTO: 156 K/UL — SIGNIFICANT CHANGE UP (ref 150–400)
POTASSIUM SERPL-MCNC: 4.7 MMOL/L — SIGNIFICANT CHANGE UP (ref 3.5–5.3)
POTASSIUM SERPL-SCNC: 4.7 MMOL/L — SIGNIFICANT CHANGE UP (ref 3.5–5.3)
PROT SERPL-MCNC: 6.2 G/DL — SIGNIFICANT CHANGE UP (ref 6–8.3)
RBC # BLD: 3.34 M/UL — LOW (ref 4.2–5.8)
RBC # FLD: 16 % — HIGH (ref 10.3–14.5)
SODIUM SERPL-SCNC: 136 MMOL/L — SIGNIFICANT CHANGE UP (ref 135–145)
SPECIMEN SOURCE: SIGNIFICANT CHANGE UP
TRIGL SERPL-MCNC: 102 MG/DL — SIGNIFICANT CHANGE UP
WBC # BLD: 4.87 K/UL — SIGNIFICANT CHANGE UP (ref 3.8–10.5)
WBC # FLD AUTO: 4.87 K/UL — SIGNIFICANT CHANGE UP (ref 3.8–10.5)

## 2024-08-17 PROCEDURE — 99232 SBSQ HOSP IP/OBS MODERATE 35: CPT

## 2024-08-17 RX ORDER — I.V. FAT EMULSION 20 G/100ML
29.2 EMULSION INTRAVENOUS
Qty: 70 | Refills: 0 | Status: DISCONTINUED | OUTPATIENT
Start: 2024-08-17 | End: 2024-08-18

## 2024-08-17 RX ORDER — ELECTROLYTE SOLUTION,INJ
1 VIAL (ML) INTRAVENOUS
Refills: 0 | Status: DISCONTINUED | OUTPATIENT
Start: 2024-08-17 | End: 2024-08-18

## 2024-08-17 RX ORDER — I.V. FAT EMULSION 20 G/100ML
29.2 EMULSION INTRAVENOUS
Qty: 70 | Refills: 0 | Status: DISCONTINUED | OUTPATIENT
Start: 2024-08-17 | End: 2024-08-17

## 2024-08-17 RX ORDER — PANTOPRAZOLE SODIUM 40 MG
40 TABLET, DELAYED RELEASE (ENTERIC COATED) ORAL
Refills: 0 | Status: DISCONTINUED | OUTPATIENT
Start: 2024-08-17 | End: 2024-08-19

## 2024-08-17 RX ORDER — ELECTROLYTE SOLUTION,INJ
1 VIAL (ML) INTRAVENOUS
Refills: 0 | Status: DISCONTINUED | OUTPATIENT
Start: 2024-08-17 | End: 2024-08-17

## 2024-08-17 RX ADMIN — Medication 40 MILLIGRAM(S): at 18:32

## 2024-08-17 RX ADMIN — Medication 1 EACH: at 19:47

## 2024-08-17 RX ADMIN — ACETAMINOPHEN 975 MILLIGRAM(S): 325 TABLET ORAL at 01:37

## 2024-08-17 RX ADMIN — Medication 1 EACH: at 07:49

## 2024-08-17 RX ADMIN — ACETAMINOPHEN 975 MILLIGRAM(S): 325 TABLET ORAL at 15:07

## 2024-08-17 RX ADMIN — ACETAMINOPHEN 975 MILLIGRAM(S): 325 TABLET ORAL at 22:01

## 2024-08-17 RX ADMIN — Medication 40 MILLIGRAM(S): at 05:04

## 2024-08-17 RX ADMIN — ACETAMINOPHEN 975 MILLIGRAM(S): 325 TABLET ORAL at 01:40

## 2024-08-17 RX ADMIN — ACETAMINOPHEN 975 MILLIGRAM(S): 325 TABLET ORAL at 09:02

## 2024-08-17 RX ADMIN — PIPERACILLIN SODIUM AND TAZOBACTAM SODIUM 25 GRAM(S): 3; .375 INJECTION, POWDER, FOR SOLUTION INTRAVENOUS at 05:04

## 2024-08-17 RX ADMIN — ACETAMINOPHEN 975 MILLIGRAM(S): 325 TABLET ORAL at 21:59

## 2024-08-17 RX ADMIN — I.V. FAT EMULSION 29.2 ML/HR: 20 EMULSION INTRAVENOUS at 19:48

## 2024-08-17 RX ADMIN — ENOXAPARIN SODIUM 40 MILLIGRAM(S): 100 INJECTION SUBCUTANEOUS at 05:04

## 2024-08-17 RX ADMIN — OXYCODONE HYDROCHLORIDE 5 MILLIGRAM(S): 5 TABLET ORAL at 21:00

## 2024-08-17 RX ADMIN — OXYCODONE HYDROCHLORIDE 5 MILLIGRAM(S): 5 TABLET ORAL at 19:56

## 2024-08-17 RX ADMIN — Medication 2: at 08:57

## 2024-08-17 RX ADMIN — OXYCODONE HYDROCHLORIDE 2.5 MILLIGRAM(S): 5 TABLET ORAL at 15:40

## 2024-08-17 RX ADMIN — OXYCODONE HYDROCHLORIDE 2.5 MILLIGRAM(S): 5 TABLET ORAL at 15:07

## 2024-08-17 NOTE — PHYSICAL EXAM
[Restricted in physically strenuous activity but ambulatory and able to carry out work of a light or sedentary nature] : Status 1- Restricted in physically strenuous activity but ambulatory and able to carry out work of a light or sedentary nature, e.g., light house work, office work [Thrush] : thrush [Normal] : affect appropriate [Ulcers] : no ulcers [de-identified] : mild swelling and discoloration of neck skin

## 2024-08-17 NOTE — PROGRESS NOTE ADULT - ASSESSMENT
79 year old man with a history of locally advanced rectal adenocarcinoma status post neoadjuvant chemoradiotherapy and abdominoperineal resection (Dr. 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 Patient originally planned for NPO x 5 days followed by UGI series to check for leak prior to considering enteral diet. TPN team consulted given anticipated prolonged NPO status.     GOAL PN: 135g amino acids, 210g dextrose, 70g SMOF lipid; to provide 1954kcal/day (28.4kcal/kg and 1.96 g/kg protein based on dosing wt 68.8kg)    - Nutritional Assessment, patient not meeting nutritional goals enterally and is benefiting from TPN for nutritional support given severe protein calorie malnutrition with prealbumin of 11 mg/dL from 8/12; trend prealbumin weekly   - TPN plan: continue goal AA/Dex/SMOF; compress to 14 hours in anticipation of possible d/c home with TPN  - TPN access:  PICC with dedicated port in place; maintain per protocol   - Electrolyte Imbalance risk- check CMP, Mg, Phos, iCa daily, will adjust in TPN as needed  - Hypophosphatemia:  continue NaPhos to 60 mM  - Hypokalemia:  increase KCl 100 mEq  - Uptrend in sodium:  increased free water of 2400 mL  - Anemia:  Iron studies, B12, folate reviewed; recommend Folate repletion orally once enteral diet resumes  - Risk of glucose dysfunction with TPN: HgA1c 5.8%; finger sticks every 6 hours to monitor glucose trend; no RI in TPN  - Risk for Hypertriglyceridemia with TPN:  baseline TG 74 mg/dL --> 137 mg/dL from 8/14; monitor TG closely on TPN  - Refeeding syndrome: continue Thiamine 100 mg daily x 5 days; monitor potassium, magnesium, phosphorus, glucose and fluid balance closely.  -Outpatient follow up with Dr David Umaña:    [id number]  [place] [id number]  Entrance on [facility]  Phone [phone]    Please instruct patient when calling to make the appointment to schedule for the first MONDAY after discharge from hospital/rehab.  Please instruct patient to explain he/she is on TPN when making the appointment. Check labs, CMP, electrolytes, ionized Calcium, triglycerides and fingersticks- frequency to be determined by Dr. Umaña as outpatient.    - Continue strict Intake and Output; check weights three times a week;  pg/mL  -  Further care per primary team, Acute Care Surgery.       Available on TEAMS  TPN spectra [id number] ([phone] when dialing from outside line)  M-F 8A-2P, Weekends and holidays 8/9A-12/1P

## 2024-08-17 NOTE — HISTORY OF PRESENT ILLNESS
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  Onc History: Past history of Rectal cancer s/p neoadjuvant/adjuvant therapies and Abdominoperineal resection 2010. was having ear pain for quite some time and was treated with abx for a while before referred to ENT. He had MRI done for 6 months history of right ear Fullness and pressure: 4/11/2024 MRI soft tissue neck ([facility]) showing mixed solid/cystic right oral cavity/oropharyngeal mass, measuring up to 49mm. -Enlarged 19mm right and 15mm left level 2A lymph nodes. -Asymmetric right parotid gland which may represent parotitis. - multinodular thyroid with 23 mm right thyroid nodule. -large right and moderate left mastoid effusions.  5/1/2024 Established care with Dr Camarillo.  5/9/2024 Biopsy Tonsil, superior pole of right tonsil, excision -Invasive squamous cell carcinoma, predominantly non-keratinizing Positive for p16 MRI LHR 4/16/24: US thyroid 5/7/24: Scattered thyroid nodules including dominant right thyroid nodule. Consider further evaluation with ultrasound-guided fine-needle aspiration as clinically indicated. Subcentimeter hypoechoic right parotid nodule corresponding to the area of palpable abnormality. TI-RAD 3: Mildly suspicious (FNA if > 2.5 cm, Follow if > 1.5 cm) 5/12/2024 PET/CT IMPRESSION: 1. Intensely FDG-avid mass involving the RIGHT tongue base and tonsil, consistent with malignant involvement. 2. Bilaterally hypermetabolic lymph nodes (more so on the LEFT neck compared with the RIGHT), suspicious for metastatic disease. 3. Foci in the RIGHT colon may be due to diverticular disease; consider further evaluation with CT or MR enterography, as malignant polyps may also present in this fashion. History of previous colon cancer noted. 4. Focus in the subcarinal esophagus an area of thickening; suggest UPPER GI fluoroscopy and/or EGD if this will change patient management.  5/202024 He presents here for consideration of radiation therapy. Ongoing Right ear fullness and pressure. Denies pain, dysphagia, dysphonia and or dyspnea. Dental clearance already obtained from [facility].  5/20/2024 Follow up for GI Dr Camacho for avidity on PET. Was felt to be polyps and this will be removed soon.  6/28/24: Pt seen today for follow up while receiving his first treatment of Cisplatin. He began concurrent RT earlier this week. He reports that since starting RT he has a metallic taste in his mouth but otherwise feels ok. Denies F/C/N/V/D, SOB, dysphagia, odynophagia.  7/5/24: Patient seen today for follow up accompanied by his wife. He is here for C2 cis. He continues RT. He reports ongoing dysgeusia that is interfering with his ability to eat. He denies pain, N/V/D, F/C, dysuria STAT BMP done in trx room prior to cisplatin shows Cr 1.83 from 1.03  7/19/24: Has thick phlegm, dysgeusia, anorexia, and weight loss (>15 lbs), and dysphagia. He has been trying to take increasing amounts of puree, ensure supplements and protein shakes.  7/26/24: Patient seen today for follow up while continuing weekly carboplatin with RT. He is accompanied by his wife. They report that since his prior treatment his mouth has been very dry with thick saliva but not experiencing much pain. He has been maintaining oral nutrition with smoothies and soups as well as ensure. He denies N/V/D, fever, constipation.  8/2/24: Patient seen today in treatment room for follow up while receiving weekly carboplatin with RT. He reports ongoing thick saliva. He has not been taking nystatin as directed, was only taking is twice daily. Continues to maintain oral intake with smoothies and soups. Denies N/V/D/C, F/C, SOB.    8/9/24:  Patient seen today in treatment room for follow up while receiving weekly carboplatin with RT. He admits ageusia, poor appetite. Denies fever, chills, mouth ulcers/open lesions, dysphagia/odynophagia, mid chest burning/pain, N/V/Diarrhea, changes with urination and bowels. Denies worsening neck swelling, neck pain, SOB, wheezing.     Disease: OPSCC   Pathology: +p16 SCC of the Right Tonsil   TNM stage: T2, N2

## 2024-08-17 NOTE — PROGRESS NOTE ADULT - SUBJECTIVE AND OBJECTIVE BOX
ACS Surgery Progress Note     Subjective/24hour Events: No acute events overnight. Pt seen and examined this morning in NAD. Tolerated a CLD yesterday.     MEDICATIONS  (STANDING):  acetaminophen     Tablet .. 975 milliGRAM(s) Oral every 6 hours  chlorhexidine 2% Cloths 1 Application(s) Topical <User Schedule>  enoxaparin Injectable 40 milliGRAM(s) SubCutaneous every 24 hours  insulin lispro (ADMELOG) corrective regimen sliding scale   SubCutaneous Before meals and at bedtime  lipid, fat emulsion (Fish Oil and Plant Based) 20% Infusion 29.2 mL/Hr (29.2 mL/Hr) IV Continuous <Continuous>  pantoprazole    Tablet 40 milliGRAM(s) Oral two times a day  Parenteral Nutrition - Adult 1 Each TPN Continuous <Continuous>  Parenteral Nutrition - Adult 1 Each TPN Continuous <Continuous>    MEDICATIONS  (PRN):  benzocaine 20% Spray 1 Spray(s) Topical every 6 hours PRN Sore throat  dextrose Oral Gel 15 Gram(s) Oral once PRN Blood Glucose LESS THAN 70 milliGRAM(s)/deciliter  oxyCODONE    IR 2.5 milliGRAM(s) Oral every 4 hours PRN Moderate Pain (4 - 6)  oxyCODONE    IR 5 milliGRAM(s) Oral every 4 hours PRN Severe Pain (7 - 10)      Vital Signs:  Vital Signs Last 24 Hrs  T(C): 37.1 (17 Aug 2024 12:10), Max: 37.3 (16 Aug 2024 13:32)  T(F): 98.8 (17 Aug 2024 12:10), Max: 99.1 (16 Aug 2024 13:32)  HR: 92 (17 Aug 2024 12:10) (61 - 92)  BP: 123/84 (17 Aug 2024 12:10) (108/67 - 147/79)  BP(mean): --  RR: 18 (17 Aug 2024 12:10) (18 - 18)  SpO2: 97% (17 Aug 2024 12:10) (95% - 97%)    Parameters below as of 17 Aug 2024 12:10  Patient On (Oxygen Delivery Method): room air    General Appearance: Appears well, NAD, +NGT  Neck: Supple  Chest: Equal expansion bilaterally, equal breath sounds  CV: Pulse regular presently  Abdomen: Soft, nontender, prevena vac in place, 2 left BELL drains SS,  appropriate incisional tenderness, dressings clean and dry and intact  Extremities: Grossly symmetric, SCD's in place     CAPILLARY BLOOD GLUCOSE      POCT Blood Glucose.: 154 mg/dL (17 Aug 2024 08:28)  POCT Blood Glucose.: 139 mg/dL (16 Aug 2024 22:06)  POCT Blood Glucose.: 107 mg/dL (16 Aug 2024 17:19)      I&O's Detail    16 Aug 2024 07:01  -  17 Aug 2024 07:00  --------------------------------------------------------  IN:    Fat Emulsion (Fish Oil &amp; Plant Based) 20% Infusion: 29.2 mL    IV PiggyBack: 200 mL    Oral Fluid: 900 mL    TPN (Total Parenteral Nutrition): 75 mL  Total IN: 1204.2 mL    OUT:    Bulb (mL): 55 mL    Bulb (mL): 15 mL    Colostomy (mL): 0 mL    Voided (mL): 1400 mL  Total OUT: 1470 mL    Total NET: -265.8 mL      17 Aug 2024 07:01  -  17 Aug 2024 13:21  --------------------------------------------------------  IN:  Total IN: 0 mL    OUT:    Bulb (mL): 25 mL    Bulb (mL): 25 mL    Colostomy (mL): 0 mL    Voided (mL): 750 mL  Total OUT: 800 mL    Total NET: -800 mL        Labs:    08-17    136  |  103  |  36<H>  ----------------------------<  110<H>  4.7   |  20<L>  |  0.83    Ca    9.1      17 Aug 2024 07:47  Phos  3.5     08-17  Mg     2.0     08-17    TPro  6.2  /  Alb  3.0<L>  /  TBili  0.6  /  DBili  x   /  AST  52<H>  /  ALT  40  /  AlkPhos  94  08-17    LIVER FUNCTIONS - ( 17 Aug 2024 07:47 )  Alb: 3.0 g/dL / Pro: 6.2 g/dL / ALK PHOS: 94 U/L / ALT: 40 U/L / AST: 52 U/L / GGT: x                                 10.4   4.87  )-----------( 156      ( 17 Aug 2024 08:23 )             30.9         Imaging:

## 2024-08-17 NOTE — REVIEW OF SYSTEMS
[Fatigue] : fatigue [Diarrhea: Grade 0] : Diarrhea: Grade 0 [Negative] : Allergic/Immunologic [Fever] : no fever [Chills] : no chills [Night Sweats] : no night sweats [Recent Change In Weight] : ~T no recent weight change [Eye Pain] : no eye pain [Vision Problems] : no vision problems [Dysphagia] : no dysphagia [Loss of Hearing] : no loss of hearing [Nosebleeds] : no nosebleeds [Hoarseness] : no hoarseness [Odynophagia] : no odynophagia [Mucosal Pain] : no mucosal pain [Skin Rash] : no skin rash [Skin Wound] : no skin wound [FreeTextEntry4] : ageusia and dry mouth  [de-identified] : mild swelling and discoloration of neck skin

## 2024-08-17 NOTE — ASSESSMENT
[FreeTextEntry1] : Cancer of oropharynx (146.9) (C10.9) ANTHONY (acute kidney injury) (584.9) (N17.9) 79 yo m with 40PY remote tobacco use, quit 12 years ago, colon cancer, HTN (on meds), with stage II P16 pos OPSCC  We discussed treatment options for locally advanced oropharynx cancer, including surgery followed by radiotherapy +/- chemotherapy or definitive chemoradiation. Given the high likelihood of adjuvant treatment following surgery, we recommend definitive concurrent chemo-radiation therapy with cisplatin-based treatment. He began RT June 24th and started Cisplatin 6/28/24.  Course c/b cisplatin-induced ANTHONY which is improving with IV hydration and hypomagnesemia Treatment changed to carboplatin from cycle 3 after holding cycle 2 for ANTHONY  Plan: -will proceed to C7 chemo (the 5th dose of carbo; will be the last dose) today 8/9/24 --Continue RT. today 8/9/24 34/35 frax Plan for 35frx- will complete on 8/12/24 -reviewed lab on 8/9/24 -Renal function is improving with hydration. Creatinine normalized 1.24 on 98/9/24, down from peak close to 2. Encouraged PO hydration and additional day of IV hydration, which was scheduled for tomorrow 8/10/24. But pt wants to switch to Monday 8/12/24.  -ageusia and poor appetite. Pt does not want a G tube and understands that he needs to increase PO intake of calories.  -5/12/24 PET/CT reviewed and given FDG uptake within esophagus/colon: Is following with GI and planning for EGD and colonoscopy  -Monitor scattered thyroid nodules including dominant right thyroid nodule. to be addressed after CRT completed -Hypokalemia resolved. IV K added to hydration each time. No need for PO potassium -Oral candidiasis: Patient was not taking Nystatin as directed. Given persistent thrush and discomfort, Fluconazole x7 days prescribed on 8/2/24. Refilled for another course x 14 days on 8/9/24. Will monitor LFTs and Cr.  Televisit in 2 wks.   Discussed with Dr. Lucrecia Hart PGY6   I was with the hematology/ oncology fellow, Dr. Hart for the entire visit and agree with above documentation

## 2024-08-17 NOTE — REVIEW OF SYSTEMS
[Fatigue] : fatigue [Diarrhea: Grade 0] : Diarrhea: Grade 0 [Negative] : Allergic/Immunologic [Fever] : no fever [Chills] : no chills [Night Sweats] : no night sweats [Recent Change In Weight] : ~T no recent weight change [Eye Pain] : no eye pain [Vision Problems] : no vision problems [Dysphagia] : no dysphagia [Loss of Hearing] : no loss of hearing [Nosebleeds] : no nosebleeds [Hoarseness] : no hoarseness [Odynophagia] : no odynophagia [Mucosal Pain] : no mucosal pain [Skin Rash] : no skin rash [Skin Wound] : no skin wound [FreeTextEntry4] : ageusia and dry mouth  [de-identified] : mild swelling and discoloration of neck skin

## 2024-08-17 NOTE — HISTORY OF PRESENT ILLNESS
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  Onc History: Past history of Rectal cancer s/p neoadjuvant/adjuvant therapies and Abdominoperineal resection 2010. was having ear pain for quite some time and was treated with abx for a while before referred to ENT. He had MRI done for 6 months history of right ear Fullness and pressure: 4/11/2024 MRI soft tissue neck ([facility]) showing mixed solid/cystic right oral cavity/oropharyngeal mass, measuring up to 49mm. -Enlarged 19mm right and 15mm left level 2A lymph nodes. -Asymmetric right parotid gland which may represent parotitis. - multinodular thyroid with 23 mm right thyroid nodule. -large right and moderate left mastoid effusions.  5/1/2024 Established care with Dr Camarillo.  5/9/2024 Biopsy Tonsil, superior pole of right tonsil, excision -Invasive squamous cell carcinoma, predominantly non-keratinizing Positive for p16 MRI LHR 4/16/24: US thyroid 5/7/24: Scattered thyroid nodules including dominant right thyroid nodule. Consider further evaluation with ultrasound-guided fine-needle aspiration as clinically indicated. Subcentimeter hypoechoic right parotid nodule corresponding to the area of palpable abnormality. TI-RAD 3: Mildly suspicious (FNA if > 2.5 cm, Follow if > 1.5 cm) 5/12/2024 PET/CT IMPRESSION: 1. Intensely FDG-avid mass involving the RIGHT tongue base and tonsil, consistent with malignant involvement. 2. Bilaterally hypermetabolic lymph nodes (more so on the LEFT neck compared with the RIGHT), suspicious for metastatic disease. 3. Foci in the RIGHT colon may be due to diverticular disease; consider further evaluation with CT or MR enterography, as malignant polyps may also present in this fashion. History of previous colon cancer noted. 4. Focus in the subcarinal esophagus an area of thickening; suggest UPPER GI fluoroscopy and/or EGD if this will change patient management.  5/202024 He presents here for consideration of radiation therapy. Ongoing Right ear fullness and pressure. Denies pain, dysphagia, dysphonia and or dyspnea. Dental clearance already obtained from [facility].  5/20/2024 Follow up for GI Dr Camacho for avidity on PET. Was felt to be polyps and this will be removed soon.  6/28/24: Pt seen today for follow up while receiving his first treatment of Cisplatin. He began concurrent RT earlier this week. He reports that since starting RT he has a metallic taste in his mouth but otherwise feels ok. Denies F/C/N/V/D, SOB, dysphagia, odynophagia.  7/5/24: Patient seen today for follow up accompanied by his wife. He is here for C2 cis. He continues RT. He reports ongoing dysgeusia that is interfering with his ability to eat. He denies pain, N/V/D, F/C, dysuria STAT BMP done in trx room prior to cisplatin shows Cr 1.83 from 1.03  7/19/24: Has thick phlegm, dysgeusia, anorexia, and weight loss (>15 lbs), and dysphagia. He has been trying to take increasing amounts of puree, ensure supplements and protein shakes.  7/26/24: Patient seen today for follow up while continuing weekly carboplatin with RT. He is accompanied by his wife. They report that since his prior treatment his mouth has been very dry with thick saliva but not experiencing much pain. He has been maintaining oral nutrition with smoothies and soups as well as ensure. He denies N/V/D, fever, constipation.  8/2/24: Patient seen today in treatment room for follow up while receiving weekly carboplatin with RT. He reports ongoing thick saliva. He has not been taking nystatin as directed, was only taking is twice daily. Continues to maintain oral intake with smoothies and soups. Denies N/V/D/C, F/C, SOB.    8/9/24:  Patient seen today in treatment room for follow up while receiving weekly carboplatin with RT. He admits ageusia, poor appetite. Denies fever, chills, mouth ulcers/open lesions, dysphagia/odynophagia, mid chest burning/pain, N/V/Diarrhea, changes with urination and bowels. Denies worsening neck swelling, neck pain, SOB, wheezing.     Disease: OPSCC   Pathology: +p16 SCC of the Right Tonsil   TNM stage: T2, N2

## 2024-08-17 NOTE — PROGRESS NOTE ADULT - SUBJECTIVE AND OBJECTIVE BOX
Subjective: Patient seen and examined. No new events except as noted.     SUBJECTIVE/ROS:  nad      MEDICATIONS:  MEDICATIONS  (STANDING):  acetaminophen     Tablet .. 975 milliGRAM(s) Oral every 6 hours  chlorhexidine 2% Cloths 1 Application(s) Topical <User Schedule>  enoxaparin Injectable 40 milliGRAM(s) SubCutaneous every 24 hours  insulin lispro (ADMELOG) corrective regimen sliding scale   SubCutaneous Before meals and at bedtime  lipid, fat emulsion (Fish Oil and Plant Based) 20% Infusion 29.2 mL/Hr (29.2 mL/Hr) IV Continuous <Continuous>  pantoprazole    Tablet 40 milliGRAM(s) Oral two times a day  Parenteral Nutrition - Adult 1 Each TPN Continuous <Continuous>  Parenteral Nutrition - Adult 1 Each TPN Continuous <Continuous>      PHYSICAL EXAM:  T(C): 37.1 (08-17-24 @ 12:10), Max: 37.3 (08-16-24 @ 22:07)  HR: 92 (08-17-24 @ 12:10) (61 - 92)  BP: 123/84 (08-17-24 @ 12:10) (108/67 - 143/74)  RR: 18 (08-17-24 @ 12:10) (18 - 18)  SpO2: 97% (08-17-24 @ 12:10) (95% - 97%)  Wt(kg): --  I&O's Summary    16 Aug 2024 07:01  -  17 Aug 2024 07:00  --------------------------------------------------------  IN: 1204.2 mL / OUT: 1470 mL / NET: -265.8 mL    17 Aug 2024 07:01  -  17 Aug 2024 14:05  --------------------------------------------------------  IN: 0 mL / OUT: 800 mL / NET: -800 mL            JVP: Normal  Neck: supple  Lung: clear   CV: S1 S2 , Murmur:  Abd: soft  Ext: No edema  neuro: Awake / alert  Psych: flat affect  Skin: normal``    LABS/DATA:    CARDIAC MARKERS:                                10.4   4.87  )-----------( 156      ( 17 Aug 2024 08:23 )             30.9     08-17    136  |  103  |  36<H>  ----------------------------<  110<H>  4.7   |  20<L>  |  0.83    Ca    9.1      17 Aug 2024 07:47  Phos  3.5     08-17  Mg     2.0     08-17    TPro  6.2  /  Alb  3.0<L>  /  TBili  0.6  /  DBili  x   /  AST  52<H>  /  ALT  40  /  AlkPhos  94  08-17    proBNP:   Lipid Profile:   HgA1c:   TSH:     TELE:  EKG:

## 2024-08-17 NOTE — PROVIDER CONTACT NOTE (CRITICAL VALUE NOTIFICATION) - TEST AND RESULT REPORTED:
8/12 body fluid cx rare klebsiella pnemone, rare enterobacter complex, rare alphahemoplica strip
+ body fluid culture from 8/12; rare klebsiella pneumoniae

## 2024-08-17 NOTE — ASSESSMENT
[FreeTextEntry1] : Cancer of oropharynx (146.9) (C10.9) ANTHONY (acute kidney injury) (584.9) (N17.9) 77 yo m with 40PY remote tobacco use, quit 12 years ago, colon cancer, HTN (on meds), with stage II P16 pos OPSCC  We discussed treatment options for locally advanced oropharynx cancer, including surgery followed by radiotherapy +/- chemotherapy or definitive chemoradiation. Given the high likelihood of adjuvant treatment following surgery, we recommend definitive concurrent chemo-radiation therapy with cisplatin-based treatment. He began RT June 24th and started Cisplatin 6/28/24.  Course c/b cisplatin-induced ANTHONY which is improving with IV hydration and hypomagnesemia Treatment changed to carboplatin from cycle 3 after holding cycle 2 for ANTHONY  Plan: -will proceed to C7 chemo (the 5th dose of carbo; will be the last dose) today 8/9/24 --Continue RT. today 8/9/24 34/35 frax Plan for 35frx- will complete on 8/12/24 -reviewed lab on 8/9/24 -Renal function is improving with hydration. Creatinine normalized 1.24 on 98/9/24, down from peak close to 2. Encouraged PO hydration and additional day of IV hydration, which was scheduled for tomorrow 8/10/24. But pt wants to switch to Monday 8/12/24.  -ageusia and poor appetite. Pt does not want a G tube and understands that he needs to increase PO intake of calories.  -5/12/24 PET/CT reviewed and given FDG uptake within esophagus/colon: Is following with GI and planning for EGD and colonoscopy  -Monitor scattered thyroid nodules including dominant right thyroid nodule. to be addressed after CRT completed -Hypokalemia resolved. IV K added to hydration each time. No need for PO potassium -Oral candidiasis: Patient was not taking Nystatin as directed. Given persistent thrush and discomfort, Fluconazole x7 days prescribed on 8/2/24. Refilled for another course x 14 days on 8/9/24. Will monitor LFTs and Cr.  Televisit in 2 wks.   Discussed with Dr. Lucrecia Hart PGY6   I was with the hematology/ oncology fellow, Dr. Hart for the entire visit and agree with above documentation

## 2024-08-17 NOTE — PROGRESS NOTE ADULT - SUBJECTIVE AND OBJECTIVE BOX
[facility] NUTRITION SUPPORT-- FOLLOW UP NOTE      24 hour events/subjective:    Pt continues on TPN for nutritional support. Pt is asymptomatic for CP, SOB, fever, chills nor night sweats.      Diet:  Diet, Pureed (08-17-24 @ 11:17)      PAST HISTORY  --------------------------------------------------------------------------------  No significant changes to PMH, PSH, FHx, SHx, unless otherwise noted    ALLERGIES & MEDICATIONS  --------------------------------------------------------------------------------  Allergies    No Known Allergies    Intolerances      Standing Inpatient Medications  acetaminophen     Tablet .. 975 milliGRAM(s) Oral every 6 hours  chlorhexidine 2% Cloths 1 Application(s) Topical <User Schedule>  enoxaparin Injectable 40 milliGRAM(s) SubCutaneous every 24 hours  insulin lispro (ADMELOG) corrective regimen sliding scale   SubCutaneous Before meals and at bedtime  lipid, fat emulsion (Fish Oil and Plant Based) 20% Infusion 29.2 mL/Hr IV Continuous <Continuous>  pantoprazole    Tablet 40 milliGRAM(s) Oral two times a day  Parenteral Nutrition - Adult 1 Each TPN Continuous <Continuous>  Parenteral Nutrition - Adult 1 Each TPN Continuous <Continuous>    PRN Inpatient Medications  benzocaine 20% Spray 1 Spray(s) Topical every 6 hours PRN  dextrose Oral Gel 15 Gram(s) Oral once PRN  oxyCODONE    IR 2.5 milliGRAM(s) Oral every 4 hours PRN  oxyCODONE    IR 5 milliGRAM(s) Oral every 4 hours PRN      REVIEW OF SYSTEMS  --------------------------------------------------------------------------------  Gen: as per HPI  Skin: No rashes  Head/Eyes/Ears/Mouth: No headache; No sore throat  Respiratory: No dyspnea, cough,   CV: No chest pain, PND, orthopnea  GI: as per HPI  : No increased frequency, dysuria, hematuria, nocturia  MSK: No joint pain/swelling; no back pain; no edema  Neuro: No dizziness/lightheadedness, weakness, seizures, numbness, tingling  Psych: No significant nervousness, anxiety, stress, depression    All other systems were reviewed and are negative, except as noted.        LABS/STUDIES  --------------------------------------------------------------------------------              10.4   4.87  >-----------<  156      [08-17-24 @ 08:23]              30.9     136  |  103  |  36  ----------------------------<  110      [08-17-24 @ 07:47]  4.7   |  20  |  0.83        Ca     9.1     [08-17-24 @ 07:47]      iCa    1.23     [08-17 @ 07:48]      Mg     2.0     [08-17-24 @ 07:47]      Phos  3.5     [08-17-24 @ 07:47]    TPro  6.2  /  Alb  3.0  /  TBili  0.6  /  DBili  x   /  AST  52  /  ALT  40  /  AlkPhos  94  [08-17-24 @ 07:47]              Glucose: 110 mg/dL (08-17-24 @ 07:47)    Prealbumin, Serum: 11 mg/dL (08-12-24 @ 16:28)    Triglycerides      CAPILLARY BLOOD GLUCOSE      POCT Blood Glucose.: 154 mg/dL (17 Aug 2024 08:28)  POCT Blood Glucose.: 139 mg/dL (16 Aug 2024 22:06)  POCT Blood Glucose.: 107 mg/dL (16 Aug 2024 17:19)  POCT Blood Glucose.: 125 mg/dL (16 Aug 2024 12:22)      VITALS/PHYSICAL EXAM  --------------------------------------------------------------------------------  T(C): 36.9 (08-17-24 @ 08:54), Max: 37.3 (08-16-24 @ 13:32)  HR: 84 (08-17-24 @ 08:54) (61 - 88)  BP: 129/82 (08-17-24 @ 08:54) (108/67 - 147/79)  RR: 18 (08-17-24 @ 08:54) (18 - 18)  SpO2: 96% (08-17-24 @ 08:54) (95% - 97%)  Wt(kg): --        08-16-24 @ 07:01  -  08-17-24 @ 07:00  --------------------------------------------------------  IN: 1204.2 mL / OUT: 1470 mL / NET: -265.8 mL    08-17-24 @ 07:01  -  08-17-24 @ 11:51  --------------------------------------------------------  IN: 0 mL / OUT: 630 mL / NET: -630 mL        Physical Exam:        Gen: NAD, frail appearing  HEENT: NCAT PERRL   Neck discoloration/darkening, supple neck, no JVD  Chest: non labored breathing, equal chest expansion bilaterally  Abd:  Soft, nontender, Prevena vac in place, 2 left BELL drains SS, appropriate incisional tenderness, dressings c/d/i  : No suprapubic tenderness  Ext: Warm, FROM, no edema b/l LE  Neuro: No focal deficits  Psych: Normal affect and mood  Skin: Warm, without rashes     .  .  .  .    .

## 2024-08-17 NOTE — PROGRESS NOTE ADULT - ASSESSMENT
Mr. Fulton is a 79 year old man with a history of locally advanced rectal adenocarcinoma status post neoadjuvant chemoradiotherapy and abdominoperineal resection (Dr. 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 Upper GI series completed 8/16 without evidence of leak. Pt transitioned to a diet without complication. Pt now s/p completion of post operative antibiotic course.     Plan:  - Advanced to Pureed diet.   - TPN - now on 18h  - PPI BID for ulcer ppx  - pain control  - OOB/amb  - PT eval: home PT with DME ([facility]) and 2 weeks of TPN    Acute care surgery, pager#[phone]

## 2024-08-17 NOTE — PHYSICAL EXAM
[Restricted in physically strenuous activity but ambulatory and able to carry out work of a light or sedentary nature] : Status 1- Restricted in physically strenuous activity but ambulatory and able to carry out work of a light or sedentary nature, e.g., light house work, office work [Thrush] : thrush [Normal] : affect appropriate [Ulcers] : no ulcers [de-identified] : mild swelling and discoloration of neck skin

## 2024-08-17 NOTE — HISTORY OF PRESENT ILLNESS
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  Onc History: Past history of Rectal cancer s/p neoadjuvant/adjuvant therapies and Abdominoperineal resection 2010. was having ear pain for quite some time and was treated with abx for a while before referred to ENT. He had MRI done for 6 months history of right ear Fullness and pressure: 4/11/2024 MRI soft tissue neck ([facility]) showing mixed solid/cystic right oral cavity/oropharyngeal mass, measuring up to 49mm. -Enlarged 19mm right and 15mm left level 2A lymph nodes. -Asymmetric right parotid gland which may represent parotitis. - multinodular thyroid with 23 mm right thyroid nodule. -large right and moderate left mastoid effusions.  5/1/2024 Established care with Dr Camarillo.  5/9/2024 Biopsy Tonsil, superior pole of right tonsil, excision -Invasive squamous cell carcinoma, predominantly non-keratinizing Positive for p16 MRI LHR 4/16/24: US thyroid 5/7/24: Scattered thyroid nodules including dominant right thyroid nodule. Consider further evaluation with ultrasound-guided fine-needle aspiration as clinically indicated. Subcentimeter hypoechoic right parotid nodule corresponding to the area of palpable abnormality. TI-RAD 3: Mildly suspicious (FNA if > 2.5 cm, Follow if > 1.5 cm) 5/12/2024 PET/CT IMPRESSION: 1. Intensely FDG-avid mass involving the RIGHT tongue base and tonsil, consistent with malignant involvement. 2. Bilaterally hypermetabolic lymph nodes (more so on the LEFT neck compared with the RIGHT), suspicious for metastatic disease. 3. Foci in the RIGHT colon may be due to diverticular disease; consider further evaluation with CT or MR enterography, as malignant polyps may also present in this fashion. History of previous colon cancer noted. 4. Focus in the subcarinal esophagus an area of thickening; suggest UPPER GI fluoroscopy and/or EGD if this will change patient management.  5/202024 He presents here for consideration of radiation therapy. Ongoing Right ear fullness and pressure. Denies pain, dysphagia, dysphonia and or dyspnea. Dental clearance already obtained from [facility].  5/20/2024 Follow up for GI Dr Camacho for avidity on PET. Was felt to be polyps and this will be removed soon.  6/28/24: Pt seen today for follow up while receiving his first treatment of Cisplatin. He began concurrent RT earlier this week. He reports that since starting RT he has a metallic taste in his mouth but otherwise feels ok. Denies F/C/N/V/D, SOB, dysphagia, odynophagia.  7/5/24: Patient seen today for follow up accompanied by his wife. He is here for C2 cis. He continues RT. He reports ongoing dysgeusia that is interfering with his ability to eat. He denies pain, N/V/D, F/C, dysuria STAT BMP done in trx room prior to cisplatin shows Cr 1.83 from 1.03  7/19/24: Has thick phlegm, dysgeusia, anorexia, and weight loss (>15 lbs), and dysphagia. He has been trying to take increasing amounts of puree, ensure supplements and protein shakes.  7/26/24: Patient seen today for follow up while continuing weekly carboplatin with RT. He is accompanied by his wife. They report that since his prior treatment his mouth has been very dry with thick saliva but not experiencing much pain. He has been maintaining oral nutrition with smoothies and soups as well as ensure. He denies N/V/D, fever, constipation.  8/2/24: Patient seen today in treatment room for follow up while receiving weekly carboplatin with RT. He reports ongoing thick saliva. He has not been taking nystatin as directed, was only taking is twice daily. Continues to maintain oral intake with smoothies and soups. Denies N/V/D/C, F/C, SOB.    8/9/24:  Patient seen today in treatment room for follow up while receiving weekly carboplatin with RT. He admits ageusia, poor appetite. Denies fever, chills, mouth ulcers/open lesions, dysphagia/odynophagia, mid chest burning/pain, N/V/Diarrhea, changes with urination and bowels. Denies worsening neck swelling, neck pain, SOB, wheezing.     Disease: OPSCC   Pathology: +p16 SCC of the Right Tonsil   TNM stage: T2, N2

## 2024-08-17 NOTE — PHYSICAL EXAM
[Restricted in physically strenuous activity but ambulatory and able to carry out work of a light or sedentary nature] : Status 1- Restricted in physically strenuous activity but ambulatory and able to carry out work of a light or sedentary nature, e.g., light house work, office work [Thrush] : thrush [Normal] : affect appropriate [Ulcers] : no ulcers [de-identified] : mild swelling and discoloration of neck skin

## 2024-08-18 LAB
ALBUMIN SERPL ELPH-MCNC: 3.1 G/DL — LOW (ref 3.3–5)
ALP SERPL-CCNC: 96 U/L — SIGNIFICANT CHANGE UP (ref 40–120)
ALT FLD-CCNC: 33 U/L — SIGNIFICANT CHANGE UP (ref 10–45)
ANION GAP SERPL CALC-SCNC: 11 MMOL/L — SIGNIFICANT CHANGE UP (ref 5–17)
AST SERPL-CCNC: 41 U/L — HIGH (ref 10–40)
BILIRUB SERPL-MCNC: 0.4 MG/DL — SIGNIFICANT CHANGE UP (ref 0.2–1.2)
BUN SERPL-MCNC: 34 MG/DL — HIGH (ref 7–23)
CA-I BLD-SCNC: 1.23 MMOL/L — SIGNIFICANT CHANGE UP (ref 1.15–1.33)
CALCIUM SERPL-MCNC: 9.4 MG/DL — SIGNIFICANT CHANGE UP (ref 8.4–10.5)
CHLORIDE SERPL-SCNC: 102 MMOL/L — SIGNIFICANT CHANGE UP (ref 96–108)
CO2 SERPL-SCNC: 22 MMOL/L — SIGNIFICANT CHANGE UP (ref 22–31)
CREAT SERPL-MCNC: 0.73 MG/DL — SIGNIFICANT CHANGE UP (ref 0.5–1.3)
EGFR: 94 ML/MIN/1.73M2 — SIGNIFICANT CHANGE UP
GLUCOSE BLDC GLUCOMTR-MCNC: 158 MG/DL — HIGH (ref 70–99)
GLUCOSE SERPL-MCNC: 128 MG/DL — HIGH (ref 70–99)
HCT VFR BLD CALC: 34.7 % — LOW (ref 39–50)
HGB BLD-MCNC: 11.3 G/DL — LOW (ref 13–17)
MAGNESIUM SERPL-MCNC: 2 MG/DL — SIGNIFICANT CHANGE UP (ref 1.6–2.6)
MCHC RBC-ENTMCNC: 30.9 PG — SIGNIFICANT CHANGE UP (ref 27–34)
MCHC RBC-ENTMCNC: 32.6 GM/DL — SIGNIFICANT CHANGE UP (ref 32–36)
MCV RBC AUTO: 94.8 FL — SIGNIFICANT CHANGE UP (ref 80–100)
NRBC # BLD: 0 /100 WBCS — SIGNIFICANT CHANGE UP (ref 0–0)
PHOSPHATE SERPL-MCNC: 3.7 MG/DL — SIGNIFICANT CHANGE UP (ref 2.5–4.5)
PLATELET # BLD AUTO: 198 K/UL — SIGNIFICANT CHANGE UP (ref 150–400)
POTASSIUM SERPL-MCNC: 4.9 MMOL/L — SIGNIFICANT CHANGE UP (ref 3.5–5.3)
POTASSIUM SERPL-SCNC: 4.9 MMOL/L — SIGNIFICANT CHANGE UP (ref 3.5–5.3)
PROT SERPL-MCNC: 6.3 G/DL — SIGNIFICANT CHANGE UP (ref 6–8.3)
RBC # BLD: 3.66 M/UL — LOW (ref 4.2–5.8)
RBC # FLD: 15.9 % — HIGH (ref 10.3–14.5)
SODIUM SERPL-SCNC: 135 MMOL/L — SIGNIFICANT CHANGE UP (ref 135–145)
TRIGL SERPL-MCNC: 130 MG/DL — SIGNIFICANT CHANGE UP
WBC # BLD: 5.89 K/UL — SIGNIFICANT CHANGE UP (ref 3.8–10.5)
WBC # FLD AUTO: 5.89 K/UL — SIGNIFICANT CHANGE UP (ref 3.8–10.5)

## 2024-08-18 PROCEDURE — 99232 SBSQ HOSP IP/OBS MODERATE 35: CPT

## 2024-08-18 RX ORDER — ELECTROLYTE SOLUTION,INJ
1 VIAL (ML) INTRAVENOUS
Refills: 0 | Status: DISCONTINUED | OUTPATIENT
Start: 2024-08-18 | End: 2024-08-18

## 2024-08-18 RX ORDER — I.V. FAT EMULSION 20 G/100ML
29.2 EMULSION INTRAVENOUS
Qty: 70 | Refills: 0 | Status: DISCONTINUED | OUTPATIENT
Start: 2024-08-18 | End: 2024-08-18

## 2024-08-18 RX ORDER — I.V. FAT EMULSION 20 G/100ML
29.2 EMULSION INTRAVENOUS
Qty: 70 | Refills: 0 | Status: DISCONTINUED | OUTPATIENT
Start: 2024-08-18 | End: 2024-08-19

## 2024-08-18 RX ADMIN — OXYCODONE HYDROCHLORIDE 5 MILLIGRAM(S): 5 TABLET ORAL at 20:30

## 2024-08-18 RX ADMIN — Medication 2: at 08:52

## 2024-08-18 RX ADMIN — OXYCODONE HYDROCHLORIDE 5 MILLIGRAM(S): 5 TABLET ORAL at 14:09

## 2024-08-18 RX ADMIN — OXYCODONE HYDROCHLORIDE 5 MILLIGRAM(S): 5 TABLET ORAL at 21:00

## 2024-08-18 RX ADMIN — OXYCODONE HYDROCHLORIDE 5 MILLIGRAM(S): 5 TABLET ORAL at 13:39

## 2024-08-18 RX ADMIN — Medication 1 EACH: at 19:25

## 2024-08-18 RX ADMIN — ACETAMINOPHEN 975 MILLIGRAM(S): 325 TABLET ORAL at 05:32

## 2024-08-18 RX ADMIN — Medication 1 EACH: at 18:17

## 2024-08-18 RX ADMIN — Medication 40 MILLIGRAM(S): at 17:26

## 2024-08-18 RX ADMIN — CHLORHEXIDINE GLUCONATE 1 APPLICATION(S): 40 SOLUTION TOPICAL at 08:51

## 2024-08-18 RX ADMIN — ENOXAPARIN SODIUM 40 MILLIGRAM(S): 100 INJECTION SUBCUTANEOUS at 04:32

## 2024-08-18 RX ADMIN — I.V. FAT EMULSION 29.2 ML/HR: 20 EMULSION INTRAVENOUS at 18:18

## 2024-08-18 RX ADMIN — ACETAMINOPHEN 975 MILLIGRAM(S): 325 TABLET ORAL at 08:52

## 2024-08-18 RX ADMIN — ACETAMINOPHEN 975 MILLIGRAM(S): 325 TABLET ORAL at 04:32

## 2024-08-18 RX ADMIN — Medication 40 MILLIGRAM(S): at 04:33

## 2024-08-18 RX ADMIN — ACETAMINOPHEN 975 MILLIGRAM(S): 325 TABLET ORAL at 09:22

## 2024-08-18 RX ADMIN — Medication 1 EACH: at 07:28

## 2024-08-18 RX ADMIN — I.V. FAT EMULSION 29.2 ML/HR: 20 EMULSION INTRAVENOUS at 19:26

## 2024-08-18 NOTE — PROGRESS NOTE ADULT - SUBJECTIVE AND OBJECTIVE BOX
Subjective: Patient seen and examined. No new events except as noted.     SUBJECTIVE/ROS:  nad      MEDICATIONS:  MEDICATIONS  (STANDING):  acetaminophen     Tablet .. 975 milliGRAM(s) Oral every 6 hours  chlorhexidine 2% Cloths 1 Application(s) Topical <User Schedule>  enoxaparin Injectable 40 milliGRAM(s) SubCutaneous every 24 hours  insulin lispro (ADMELOG) corrective regimen sliding scale   SubCutaneous Before meals and at bedtime  lipid, fat emulsion (Fish Oil and Plant Based) 20% Infusion 29.2 mL/Hr (29.2 mL/Hr) IV Continuous <Continuous>  pantoprazole    Tablet 40 milliGRAM(s) Oral two times a day  Parenteral Nutrition - Adult 1 Each TPN Continuous <Continuous>      PHYSICAL EXAM:  T(C): 36.8 (08-18-24 @ 08:30), Max: 37.1 (08-17-24 @ 12:10)  HR: 86 (08-18-24 @ 08:30) (67 - 92)  BP: 130/84 (08-18-24 @ 08:30) (120/74 - 135/73)  RR: 18 (08-18-24 @ 08:30) (18 - 18)  SpO2: 96% (08-18-24 @ 08:30) (95% - 98%)  Wt(kg): --  I&O's Summary    17 Aug 2024 07:01  -  18 Aug 2024 07:00  --------------------------------------------------------  IN: 960 mL / OUT: 1951 mL / NET: -991 mL    18 Aug 2024 07:01  -  18 Aug 2024 08:40  --------------------------------------------------------  IN: 240 mL / OUT: 230 mL / NET: 10 mL            JVP: Normal  Neck: supple  Lung: clear   CV: S1 S2 , Murmur:  Abd: soft  Ext: No edema  neuro: Awake / alert  Psych: flat affect  Skin: normal``    LABS/DATA:    CARDIAC MARKERS:                                11.3   5.89  )-----------( 198      ( 18 Aug 2024 06:59 )             34.7     08-18    135  |  102  |  34<H>  ----------------------------<  128<H>  4.9   |  22  |  0.73    Ca    9.4      18 Aug 2024 06:59  Phos  3.7     08-18  Mg     2.0     08-18    TPro  6.3  /  Alb  3.1<L>  /  TBili  0.4  /  DBili  x   /  AST  41<H>  /  ALT  33  /  AlkPhos  96  08-18    proBNP:   Lipid Profile:   HgA1c:   TSH:     TELE:  EKG:

## 2024-08-18 NOTE — PROGRESS NOTE ADULT - SUBJECTIVE AND OBJECTIVE BOX
SUBJECTIVE: Pt seen at bedside during AM rounds. No o/n events, patient resting comfortably.    Vital Signs Last 24 Hrs  T(C): 36.8 (18 Aug 2024 04:47), Max: 37.1 (17 Aug 2024 12:10)  T(F): 98.3 (18 Aug 2024 04:47), Max: 98.8 (17 Aug 2024 12:10)  HR: 92 (18 Aug 2024 04:47) (67 - 92)  BP: 120/74 (18 Aug 2024 04:47) (120/74 - 135/73)  BP(mean): --  RR: 18 (18 Aug 2024 04:47) (18 - 18)  SpO2: 95% (18 Aug 2024 04:47) (95% - 98%)    Parameters below as of 18 Aug 2024 04:47  Patient On (Oxygen Delivery Method): room air        I&O's Summary    17 Aug 2024 07:01  -  18 Aug 2024 07:00  --------------------------------------------------------  IN: 960 mL / OUT: 1951 mL / NET: -991 mL        LABS:                        11.3   5.89  )-----------( 198      ( 18 Aug 2024 06:59 )             34.7     08-18    135  |  102  |  34<H>  ----------------------------<  128<H>  4.9   |  22  |  0.73    Ca    9.4      18 Aug 2024 06:59  Phos  3.7     08-18  Mg     2.0     08-18    TPro  6.3  /  Alb  3.1<L>  /  TBili  0.4  /  DBili  x   /  AST  41<H>  /  ALT  33  /  AlkPhos  96  08-18      Urinalysis Basic - ( 18 Aug 2024 06:59 )    Color: x / Appearance: x / SG: x / pH: x  Gluc: 128 mg/dL / Ketone: x  / Bili: x / Urobili: x   Blood: x / Protein: x / Nitrite: x   Leuk Esterase: x / RBC: x / WBC x   Sq Epi: x / Non Sq Epi: x / Bacteria: x        Physical Exam:  General Appearance: Appears well, NAD  Neck: Supple  Chest: Equal expansion bilaterally, equal breath sounds  CV: Pulse regular presently  Abdomen: Soft, nontender, appropriate incisional tenderness, dressings clean and dry and intact  Extremities: Grossly symmetric, SCD's in place

## 2024-08-18 NOTE — PROGRESS NOTE ADULT - ATTENDING COMMENTS
ATTENDING ATTESTATION  I have seen and examined this patient on rounds thismorning with the surgery team. I have reviewed all new labs, imaging and reports. I have participated in formulating the plan for the day, and have read and agree with the history, ROS, exam, assessment and plan as stated above.     POD 4 from Kash patch repair.   Planned for UGIS tomorrow.   Abx end today (completing 4days post op abx).   Planning for 2 weeks TPN --> Discuss with SW to start setting up for home TPN.     Madeline Cunningham M.D., M.S.  Division of Acute Care Surgery
ATTENDING ATTESTATION  I have seen and examined this patient on rounds thismorning with the surgery team. I have reviewed all new labs, imaging and reports. I have participated in formulating the plan for the day, and have read and agree with the history, ROS, exam, assessment and plan as stated above.     POD 4 from Kash patch repair.   Planned for UGIS tomorrow.   Abx end today (completing 4days post op abx).   Planning for 2 weeks TPN --> Discuss with SW to start setting up for home TPN.     Madeline Cunningham M.D., M.S.  Division of Acute Care Surgery
ATTENDING ATTESTATION  I have seen and examined this patient on rounds thismorning with the surgery team. I have reviewed all new labs, imaging and reports. I have participated in formulating the plan for the day, and have read and agree with the history, ROS, exam, assessment and plan as stated above.     From SICU to floor today, POOD 2 from benedict patch repair of ulcer over pylorus.   On TPN for poor pre-op nutrition (planned for 2 weeks to promote wound healing).   On Diflucan and Zosyn, planned 4 days from surgery.   UGIS POD 5, BELL drain removal after eating regular diet if no leak.     Madeline Cunningham M.D., M.S.  Division of Acute Care Surgery
patient is doing well  advanced diet
ATTENDING ATTESTATION  I have seen and examined this patient on rounds thismorning with the surgery team. I have reviewed all new labs, imaging and reports. I have participated in formulating the plan for the day, and have read and agree with the history, ROS, exam, assessment and plan as stated above.     POD 7 from benedict patch repair.   NGT removed yesterday, tolerated clears. Regular diet today. BELL drain outputs remain non-bilious.   Finally with stool output from ostomy --> sent for h. pylori.   PPI to oral now.   Likely for discharge tomorrow: pending h.pylori test, drain teaching, and set up for home TPN (for a total of 2 weeks post-op).     Madeline Cunningham M.D., M.S.  Division of Acute Care Surgery

## 2024-08-18 NOTE — PROGRESS NOTE ADULT - ASSESSMENT
79 year old man with a history of locally advanced rectal adenocarcinoma status post neoadjuvant chemoradiotherapy and abdominoperineal resection (Dr. 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 Patient originally planned for NPO x 5 days followed by UGI series to check for leak prior to considering enteral diet. TPN team consulted given anticipated prolonged NPO status.     GOAL PN: 135g amino acids, 210g dextrose, 70g SMOF lipid; to provide 1954kcal/day (28.4kcal/kg and 1.96 g/kg protein based on dosing wt 68.8kg)    - Nutritional Assessment, patient not meeting nutritional goals enterally and is benefiting from TPN for nutritional support given severe protein calorie malnutrition with prealbumin of 11 mg/dL from 8/12; trend prealbumin weekly   - TPN plan: continue goal AA/Dex/SMOF; compress to 14 hours in anticipation of possible d/c home with TPN  - TPN access:  PICC with dedicated port in place; maintain per protocol   - Electrolyte Imbalance risk- check CMP, Mg, Phos, iCa daily, will adjust in TPN as needed  - Hypophosphatemia:  continue NaPhos to 60 mM  - Hypokalemia:  increase KCl 100 mEq  - Uptrend in sodium:  increased free water of 2400 mL  - Anemia:  Iron studies, B12, folate reviewed; recommend Folate repletion orally once enteral diet resumes  - Risk of glucose dysfunction with TPN: HgA1c 5.8%; finger sticks every 6 hours to monitor glucose trend; no RI in TPN  - Risk for Hypertriglyceridemia with TPN:  baseline TG 74 mg/dL --> 137 mg/dL from 8/14; monitor TG closely on TPN  - Refeeding syndrome: continue Thiamine 100 mg daily x 5 days; monitor potassium, magnesium, phosphorus, glucose and fluid balance closely.  -Outpatient follow up with Dr David Umaña:    [address]  [place] [id number]  Entrance on [facility]  Phone [phone]    Please instruct patient when calling to make the appointment to schedule for the first MONDAY after discharge from hospital/rehab.  Please instruct patient to explain he/she is on TPN when making the appointment. Check labs, CMP, electrolytes, ionized Calcium, triglycerides and fingersticks- frequency to be determined by Dr. Umaña as outpatient.    - Continue strict Intake and Output; check weights three times a week;  pg/mL  -  Further care per primary team, Acute Care Surgery.       Available on TEAMS  TPN spectra [id number] ([phone] when dialing from outside line)  M-F 8A-2P, Weekends and holidays 8/9A-12/1P         79 year old man with a history of locally advanced rectal adenocarcinoma status post neoadjuvant chemoradiotherapy and abdominoperineal resection (Dr. 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 Patient originally planned for NPO x 5 days followed by UGI series to check for leak prior to considering enteral diet. TPN team consulted given anticipated prolonged NPO status.     GOAL PN: 135g amino acids, 210g dextrose, 70g SMOF lipid; to provide 1954kcal/day (28.4kcal/kg and 1.96 g/kg protein based on dosing wt 68.8kg)    - Nutritional Assessment, patient not meeting nutritional goals enterally and is benefiting from TPN for nutritional support given severe protein calorie malnutrition with prealbumin of 11 mg/dL from 8/12; trend prealbumin weekly   - TPN plan: continue goal AA/Dex/SMOF; compress to 12 hours in anticipation of possible d/c home with TPN  - TPN access:  PICC with dedicated port in place; maintain per protocol   - Electrolyte Imbalance risk- check CMP, Mg, Phos, iCa daily, will adjust in TPN as needed  - Hypophosphatemia:  continue NaPhos to 60 mM  - Hypokalemia:  increase KCl 100 mEq  - Uptrend in sodium:  increased free water of 2400 mL but now improved will decrease TV to 2000ml.  - Anemia:  Iron studies, B12, folate reviewed; recommend Folate repletion orally once enteral diet resumes  - Risk of glucose dysfunction with TPN: HgA1c 5.8%; finger sticks every 6 hours to monitor glucose trend; no RI in TPN  - Risk for Hypertriglyceridemia with TPN:  baseline TG 74 mg/dL --> 137 mg/dL from 8/14; monitor TG closely on TPN  - Refeeding syndrome: continue Thiamine 100 mg daily x 5 days; monitor potassium, magnesium, phosphorus, glucose and fluid balance closely.  -Outpatient follow up with Dr David Umaña:    [address]  [place] [address]  Entrance on [facility]  Phone [phone]    Please instruct patient when calling to make the appointment to schedule for the first MONDAY after discharge from hospital/rehab.  Please instruct patient to explain he/she is on TPN when making the appointment. Check labs, CMP, electrolytes, ionized Calcium, triglycerides and fingersticks- frequency to be determined by Dr. Umaña as outpatient.    - Continue strict Intake and Output; check weights three times a week;  pg/mL  -  Further care per primary team, Acute Care Surgery.       Available on TEAMS  TPN spectra [id number] ([phone] when dialing from outside line)  M-F 8A-2P, Weekends and holidays 8/9A-12/1P

## 2024-08-18 NOTE — PROGRESS NOTE ADULT - NUTRITIONAL ASSESSMENT
This patient has been assessed with a concern for Malnutrition and has been determined to have a diagnosis/diagnoses of Severe protein-calorie malnutrition.    This patient is being managed with:   Parenteral Nutrition - Adult-  Entered: Aug 15 2024  5:00PM    lipid fat emulsion (Fish Oil and Plant Based) 20% Infusion-[Known as SMOFLIPID 20% Infusion]  70 Gram(s) in IV Solution 350 milliLiter(s) infuse at 29.2 mL/Hr  Dose Rate: 29.2 mL/Hr Infuse Over: 12 Hours  Administration Instructions: Use 1.2 micron in-line filter  Entered: Aug 15 2024  5:00PM    Diet NPO-  Entered: Aug 11 2024  7:15PM  
This patient has been assessed with a concern for Malnutrition and has been determined to have a diagnosis/diagnoses of Severe protein-calorie malnutrition.    This patient is being managed with:   Parenteral Nutrition - Adult-  Entered: Aug 17 2024  5:00PM    Diet Pureed-  Entered: Aug 17 2024 11:17AM    lipid fat emulsion (Fish Oil and Plant Based) 20% Infusion-[Known as SMOFLIPID 20% Infusion]  70 Gram(s) in IV Solution 350 milliLiter(s) infuse at 29.2 mL/Hr  Dose Rate: 29.2 mL/Hr Infuse Over: 12 Hours  Administration Instructions: Use 1.2 micron in-line filter  Entered: Aug 17 2024 11:04AM    Parenteral Nutrition - Adult-  Entered: Aug 16 2024  5:00PM  
This patient has been assessed with a concern for Malnutrition and has been determined to have a diagnosis/diagnoses of Severe protein-calorie malnutrition.    This patient is being managed with:   lipid fat emulsion (Fish Oil and Plant Based) 20% Infusion-[Known as SMOFLIPID 20% Infusion]  20 Gram(s) in IV Solution 100 milliLiter(s) infuse at 8.3 mL/Hr  Dose Rate: 8.3 mL/Hr Infuse Over: 12 Hours  Administration Instructions: Use 1.2 micron in-line filter  Entered: Aug 13 2024  5:00PM    Parenteral Nutrition - Adult-  Entered: Aug 13 2024  5:00PM    Diet NPO-  Entered: Aug 11 2024  7:15PM  
This patient has been assessed with a concern for Malnutrition and has been determined to have a diagnosis/diagnoses of Severe protein-calorie malnutrition.    This patient is being managed with:   Diet Full Liquid-  Entered: Aug 18 2024  7:44AM    Parenteral Nutrition - Adult-  Entered: Aug 17 2024  5:00PM    lipid fat emulsion (Fish Oil and Plant Based) 20% Infusion-[Known as SMOFLIPID 20% Infusion]  70 Gram(s) in IV Solution 350 milliLiter(s) infuse at 29.2 mL/Hr  Dose Rate: 29.2 mL/Hr Infuse Over: 12 Hours  Administration Instructions: Use 1.2 micron in-line filter  Entered: Aug 17 2024 11:04AM

## 2024-08-18 NOTE — PROGRESS NOTE ADULT - ASSESSMENT
tess Fulton is a 79 year old man with a history of locally advanced rectal adenocarcinoma status post neoadjuvant chemoradiotherapy and abdominoperineal resection (Dr. 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 Upper GI series completed 8/16 without evidence of leak. Pt transitioned to a diet without complication. Pt now s/p completion of post operative antibiotic course.     Plan:  - Diet backed down to full liquids  - TPN - now on 18h  - PPI BID for ulcer ppx  - pain control  - OOB/amb  - PT eval: home PT with DME (RW) and 2 weeks of TPN    [facility]  [id number] / [phone]   Mr. Fulton is a 79 year old man with a history of locally advanced rectal adenocarcinoma status post neoadjuvant chemoradiotherapy and abdominoperineal resection (Dr. 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 Upper GI series completed 8/16 without evidence of leak. Pt transitioned to a diet without complication. Pt now s/p completion of post operative antibiotic course.     Plan:  - Diet backed down to full liquids  - TPN - now on 18h  - PPI BID for ulcer ppx  - pain control  - OOB/amb  - PT eval: home PT with DME (RW) and 2 weeks of TPN    [facility]  [id number] / [phone]

## 2024-08-18 NOTE — PROGRESS NOTE ADULT - SUBJECTIVE AND OBJECTIVE BOX
[facility] NUTRITION SUPPORT-- FOLLOW UP NOTE      24 hour events/subjective:      Pt continues on TPN for nutritional support. Pt is asymptomatic for CP, SOB, fever, chills nor night sweats.      Diet:  Diet, Full Liquid (08-18-24 @ 07:44)      PAST HISTORY  --------------------------------------------------------------------------------  No significant changes to PMH, PSH, FHx, SHx, unless otherwise noted    ALLERGIES & MEDICATIONS  --------------------------------------------------------------------------------  Allergies    No Known Allergies    Intolerances      Standing Inpatient Medications  acetaminophen     Tablet .. 975 milliGRAM(s) Oral every 6 hours  chlorhexidine 2% Cloths 1 Application(s) Topical <User Schedule>  enoxaparin Injectable 40 milliGRAM(s) SubCutaneous every 24 hours  insulin lispro (ADMELOG) corrective regimen sliding scale   SubCutaneous Before meals and at bedtime  pantoprazole    Tablet 40 milliGRAM(s) Oral two times a day  Parenteral Nutrition - Adult 1 Each TPN Continuous <Continuous>    PRN Inpatient Medications  benzocaine 20% Spray 1 Spray(s) Topical every 6 hours PRN  dextrose Oral Gel 15 Gram(s) Oral once PRN  oxyCODONE    IR 5 milliGRAM(s) Oral every 4 hours PRN  oxyCODONE    IR 2.5 milliGRAM(s) Oral every 4 hours PRN      REVIEW OF SYSTEMS  --------------------------------------------------------------------------------  Gen: as per HPI  Skin: No rashes  Head/Eyes/Ears/Mouth: No headache; No sore throat  Respiratory: No dyspnea, cough,   CV: No chest pain, PND, orthopnea  GI: as per HPI  : No increased frequency, dysuria, hematuria, nocturia  MSK: No joint pain/swelling; no back pain; no edema  Neuro: No dizziness/lightheadedness, weakness, seizures, numbness, tingling  Psych: No significant nervousness, anxiety, stress, depression    All other systems were reviewed and are negative, except as noted.        LABS/STUDIES  --------------------------------------------------------------------------------              11.3   5.89  >-----------<  198      [08-18-24 @ 06:59]              34.7     135  |  102  |  34  ----------------------------<  128      [08-18-24 @ 06:59]  4.9   |  22  |  0.73        Ca     9.4     [08-18-24 @ 06:59]      iCa    1.23     [08-18 @ 07:02]      Mg     2.0     [08-18-24 @ 06:59]      Phos  3.7     [08-18-24 @ 06:59]    TPro  6.3  /  Alb  3.1  /  TBili  0.4  /  DBili  x   /  AST  41  /  ALT  33  /  AlkPhos  96  [08-18-24 @ 06:59]              Glucose: 128 mg/dL (08-18-24 @ 06:59)    Prealbumin, Serum: 11 mg/dL (08-12-24 @ 16:28)    Triglycerides      CAPILLARY BLOOD GLUCOSE      POCT Blood Glucose.: 158 mg/dL (18 Aug 2024 08:49)  POCT Blood Glucose.: 146 mg/dL (17 Aug 2024 21:44)  POCT Blood Glucose.: 132 mg/dL (17 Aug 2024 17:20)      VITALS/PHYSICAL EXAM  --------------------------------------------------------------------------------  T(C): 36.8 (08-18-24 @ 08:30), Max: 37.1 (08-17-24 @ 12:10)  HR: 86 (08-18-24 @ 08:30) (67 - 92)  BP: 130/84 (08-18-24 @ 08:30) (120/74 - 135/73)  RR: 18 (08-18-24 @ 08:30) (18 - 18)  SpO2: 96% (08-18-24 @ 08:30) (95% - 98%)  Wt(kg): --        08-17-24 @ 07:01  -  08-18-24 @ 07:00  --------------------------------------------------------  IN: 960 mL / OUT: 1951 mL / NET: -991 mL    08-18-24 @ 07:01  -  08-18-24 @ 09:39  --------------------------------------------------------  IN: 240 mL / OUT: 230 mL / NET: 10 mL            Physical Exam:        Gen: NAD, frail appearing  HEENT: NCAT PERRL   Neck discoloration/darkening, supple neck, no JVD  Chest: non labored breathing, equal chest expansion bilaterally  Abd:  Soft, nontender, Prevena vac in place, 2 left BELL drains SS, appropriate incisional tenderness, dressings c/d/i  : No suprapubic tenderness  Ext: Warm, FROM, no edema b/l LE  Neuro: No focal deficits  Psych: Normal affect and mood  Skin: Warm, without rashes     .  .  .  .    .

## 2024-08-19 ENCOUNTER — TRANSCRIPTION ENCOUNTER (OUTPATIENT)
Age: 79
End: 2024-08-19

## 2024-08-19 VITALS
OXYGEN SATURATION: 97 % | HEART RATE: 89 BPM | RESPIRATION RATE: 18 BRPM | TEMPERATURE: 98 F | SYSTOLIC BLOOD PRESSURE: 116 MMHG | DIASTOLIC BLOOD PRESSURE: 76 MMHG

## 2024-08-19 LAB
ALBUMIN SERPL ELPH-MCNC: 3.2 G/DL — LOW (ref 3.3–5)
ALP SERPL-CCNC: 93 U/L — SIGNIFICANT CHANGE UP (ref 40–120)
ALT FLD-CCNC: 32 U/L — SIGNIFICANT CHANGE UP (ref 10–45)
ANION GAP SERPL CALC-SCNC: 12 MMOL/L — SIGNIFICANT CHANGE UP (ref 5–17)
AST SERPL-CCNC: 43 U/L — HIGH (ref 10–40)
BILIRUB SERPL-MCNC: 0.3 MG/DL — SIGNIFICANT CHANGE UP (ref 0.2–1.2)
BUN SERPL-MCNC: 44 MG/DL — HIGH (ref 7–23)
CA-I BLD-SCNC: 1.24 MMOL/L — SIGNIFICANT CHANGE UP (ref 1.15–1.33)
CALCIUM SERPL-MCNC: 9.3 MG/DL — SIGNIFICANT CHANGE UP (ref 8.4–10.5)
CHLORIDE SERPL-SCNC: 101 MMOL/L — SIGNIFICANT CHANGE UP (ref 96–108)
CO2 SERPL-SCNC: 21 MMOL/L — LOW (ref 22–31)
CREAT SERPL-MCNC: 0.75 MG/DL — SIGNIFICANT CHANGE UP (ref 0.5–1.3)
EGFR: 93 ML/MIN/1.73M2 — SIGNIFICANT CHANGE UP
GLUCOSE BLDC GLUCOMTR-MCNC: 130 MG/DL — HIGH (ref 70–99)
GLUCOSE BLDC GLUCOMTR-MCNC: 140 MG/DL — HIGH (ref 70–99)
GLUCOSE SERPL-MCNC: 118 MG/DL — HIGH (ref 70–99)
HCT VFR BLD CALC: 33 % — LOW (ref 39–50)
HGB BLD-MCNC: 10.9 G/DL — LOW (ref 13–17)
MAGNESIUM SERPL-MCNC: 2 MG/DL — SIGNIFICANT CHANGE UP (ref 1.6–2.6)
MCHC RBC-ENTMCNC: 31.3 PG — SIGNIFICANT CHANGE UP (ref 27–34)
MCHC RBC-ENTMCNC: 33 GM/DL — SIGNIFICANT CHANGE UP (ref 32–36)
MCV RBC AUTO: 94.8 FL — SIGNIFICANT CHANGE UP (ref 80–100)
NRBC # BLD: 0 /100 WBCS — SIGNIFICANT CHANGE UP (ref 0–0)
PHOSPHATE SERPL-MCNC: 4.2 MG/DL — SIGNIFICANT CHANGE UP (ref 2.5–4.5)
PLATELET # BLD AUTO: 223 K/UL — SIGNIFICANT CHANGE UP (ref 150–400)
POTASSIUM SERPL-MCNC: 5.2 MMOL/L — SIGNIFICANT CHANGE UP (ref 3.5–5.3)
POTASSIUM SERPL-SCNC: 5.2 MMOL/L — SIGNIFICANT CHANGE UP (ref 3.5–5.3)
PROT SERPL-MCNC: 6.3 G/DL — SIGNIFICANT CHANGE UP (ref 6–8.3)
RBC # BLD: 3.48 M/UL — LOW (ref 4.2–5.8)
RBC # FLD: 16 % — HIGH (ref 10.3–14.5)
SODIUM SERPL-SCNC: 134 MMOL/L — LOW (ref 135–145)
TRIGL SERPL-MCNC: 128 MG/DL — SIGNIFICANT CHANGE UP
WBC # BLD: 7.74 K/UL — SIGNIFICANT CHANGE UP (ref 3.8–10.5)
WBC # FLD AUTO: 7.74 K/UL — SIGNIFICANT CHANGE UP (ref 3.8–10.5)

## 2024-08-19 PROCEDURE — 87075 CULTR BACTERIA EXCEPT BLOOD: CPT

## 2024-08-19 PROCEDURE — 96375 TX/PRO/DX INJ NEW DRUG ADDON: CPT

## 2024-08-19 PROCEDURE — 85610 PROTHROMBIN TIME: CPT

## 2024-08-19 PROCEDURE — 80061 LIPID PANEL: CPT

## 2024-08-19 PROCEDURE — 87070 CULTURE OTHR SPECIMN AEROBIC: CPT

## 2024-08-19 PROCEDURE — 82746 ASSAY OF FOLIC ACID SERUM: CPT

## 2024-08-19 PROCEDURE — 97116 GAIT TRAINING THERAPY: CPT

## 2024-08-19 PROCEDURE — 83735 ASSAY OF MAGNESIUM: CPT

## 2024-08-19 PROCEDURE — 82330 ASSAY OF CALCIUM: CPT

## 2024-08-19 PROCEDURE — 87186 SC STD MICRODIL/AGAR DIL: CPT

## 2024-08-19 PROCEDURE — 97110 THERAPEUTIC EXERCISES: CPT

## 2024-08-19 PROCEDURE — 82962 GLUCOSE BLOOD TEST: CPT

## 2024-08-19 PROCEDURE — 85027 COMPLETE CBC AUTOMATED: CPT

## 2024-08-19 PROCEDURE — 86901 BLOOD TYPING SEROLOGIC RH(D): CPT

## 2024-08-19 PROCEDURE — 85014 HEMATOCRIT: CPT

## 2024-08-19 PROCEDURE — 87102 FUNGUS ISOLATION CULTURE: CPT

## 2024-08-19 PROCEDURE — C1751: CPT

## 2024-08-19 PROCEDURE — 84466 ASSAY OF TRANSFERRIN: CPT

## 2024-08-19 PROCEDURE — 74177 CT ABD & PELVIS W/CONTRAST: CPT | Mod: MC

## 2024-08-19 PROCEDURE — 84295 ASSAY OF SERUM SODIUM: CPT

## 2024-08-19 PROCEDURE — 85045 AUTOMATED RETICULOCYTE COUNT: CPT

## 2024-08-19 PROCEDURE — 97162 PT EVAL MOD COMPLEX 30 MIN: CPT

## 2024-08-19 PROCEDURE — P9045: CPT

## 2024-08-19 PROCEDURE — 84100 ASSAY OF PHOSPHORUS: CPT

## 2024-08-19 PROCEDURE — 86850 RBC ANTIBODY SCREEN: CPT

## 2024-08-19 PROCEDURE — 84134 ASSAY OF PREALBUMIN: CPT

## 2024-08-19 PROCEDURE — 74176 CT ABD & PELVIS W/O CONTRAST: CPT | Mod: MC

## 2024-08-19 PROCEDURE — C1894: CPT

## 2024-08-19 PROCEDURE — 36415 COLL VENOUS BLD VENIPUNCTURE: CPT

## 2024-08-19 PROCEDURE — 87077 CULTURE AEROBIC IDENTIFY: CPT

## 2024-08-19 PROCEDURE — 74240 X-RAY XM UPR GI TRC 1CNTRST: CPT

## 2024-08-19 PROCEDURE — 71045 X-RAY EXAM CHEST 1 VIEW: CPT

## 2024-08-19 PROCEDURE — 82947 ASSAY GLUCOSE BLOOD QUANT: CPT

## 2024-08-19 PROCEDURE — 87205 SMEAR GRAM STAIN: CPT

## 2024-08-19 PROCEDURE — 84132 ASSAY OF SERUM POTASSIUM: CPT

## 2024-08-19 PROCEDURE — 86900 BLOOD TYPING SEROLOGIC ABO: CPT

## 2024-08-19 PROCEDURE — 83690 ASSAY OF LIPASE: CPT

## 2024-08-19 PROCEDURE — 97165 OT EVAL LOW COMPLEX 30 MIN: CPT

## 2024-08-19 PROCEDURE — 85025 COMPLETE CBC W/AUTO DIFF WBC: CPT

## 2024-08-19 PROCEDURE — 87338 HPYLORI STOOL AG IA: CPT

## 2024-08-19 PROCEDURE — C9399: CPT

## 2024-08-19 PROCEDURE — 97530 THERAPEUTIC ACTIVITIES: CPT

## 2024-08-19 PROCEDURE — 82728 ASSAY OF FERRITIN: CPT

## 2024-08-19 PROCEDURE — 82803 BLOOD GASES ANY COMBINATION: CPT

## 2024-08-19 PROCEDURE — 83605 ASSAY OF LACTIC ACID: CPT

## 2024-08-19 PROCEDURE — 83036 HEMOGLOBIN GLYCOSYLATED A1C: CPT

## 2024-08-19 PROCEDURE — 83550 IRON BINDING TEST: CPT

## 2024-08-19 PROCEDURE — 96374 THER/PROPH/DIAG INJ IV PUSH: CPT

## 2024-08-19 PROCEDURE — 84478 ASSAY OF TRIGLYCERIDES: CPT

## 2024-08-19 PROCEDURE — 83540 ASSAY OF IRON: CPT

## 2024-08-19 PROCEDURE — 84145 PROCALCITONIN (PCT): CPT

## 2024-08-19 PROCEDURE — 84443 ASSAY THYROID STIM HORMONE: CPT

## 2024-08-19 PROCEDURE — 82435 ASSAY OF BLOOD CHLORIDE: CPT

## 2024-08-19 PROCEDURE — 82607 VITAMIN B-12: CPT

## 2024-08-19 PROCEDURE — 85730 THROMBOPLASTIN TIME PARTIAL: CPT

## 2024-08-19 PROCEDURE — 36569 INSJ PICC 5 YR+ W/O IMAGING: CPT

## 2024-08-19 PROCEDURE — 80053 COMPREHEN METABOLIC PANEL: CPT

## 2024-08-19 PROCEDURE — 85018 HEMOGLOBIN: CPT

## 2024-08-19 PROCEDURE — 99285 EMERGENCY DEPT VISIT HI MDM: CPT

## 2024-08-19 PROCEDURE — 83880 ASSAY OF NATRIURETIC PEPTIDE: CPT

## 2024-08-19 PROCEDURE — 99232 SBSQ HOSP IP/OBS MODERATE 35: CPT

## 2024-08-19 RX ORDER — I.V. FAT EMULSION 20 G/100ML
29.2 EMULSION INTRAVENOUS
Qty: 70 | Refills: 0 | Status: DISCONTINUED | OUTPATIENT
Start: 2024-08-19 | End: 2024-08-19

## 2024-08-19 RX ORDER — I.V. FAT EMULSION 20 G/100ML
0 EMULSION INTRAVENOUS
Qty: 0 | Refills: 0 | DISCHARGE
Start: 2024-08-19

## 2024-08-19 RX ORDER — ACETAMINOPHEN 325 MG/1
3 TABLET ORAL
Qty: 0 | Refills: 0 | DISCHARGE
Start: 2024-08-19

## 2024-08-19 RX ORDER — ELECTROLYTE SOLUTION,INJ
1 VIAL (ML) INTRAVENOUS
Qty: 0 | Refills: 0 | DISCHARGE
Start: 2024-08-19

## 2024-08-19 RX ORDER — OXYCODONE HYDROCHLORIDE 5 MG/1
1 TABLET ORAL
Qty: 8 | Refills: 0
Start: 2024-08-19 | End: 2024-08-21

## 2024-08-19 RX ORDER — ELECTROLYTE SOLUTION,INJ
1 VIAL (ML) INTRAVENOUS
Refills: 0 | Status: DISCONTINUED | OUTPATIENT
Start: 2024-08-19 | End: 2024-08-19

## 2024-08-19 RX ORDER — PANTOPRAZOLE SODIUM 40 MG
1 TABLET, DELAYED RELEASE (ENTERIC COATED) ORAL
Qty: 60 | Refills: 2
Start: 2024-08-19 | End: 2024-11-16

## 2024-08-19 RX ADMIN — OXYCODONE HYDROCHLORIDE 5 MILLIGRAM(S): 5 TABLET ORAL at 02:21

## 2024-08-19 RX ADMIN — OXYCODONE HYDROCHLORIDE 5 MILLIGRAM(S): 5 TABLET ORAL at 06:43

## 2024-08-19 RX ADMIN — Medication 0: at 00:27

## 2024-08-19 RX ADMIN — ACETAMINOPHEN 975 MILLIGRAM(S): 325 TABLET ORAL at 03:11

## 2024-08-19 RX ADMIN — OXYCODONE HYDROCHLORIDE 5 MILLIGRAM(S): 5 TABLET ORAL at 01:51

## 2024-08-19 RX ADMIN — ACETAMINOPHEN 975 MILLIGRAM(S): 325 TABLET ORAL at 00:00

## 2024-08-19 RX ADMIN — ENOXAPARIN SODIUM 40 MILLIGRAM(S): 100 INJECTION SUBCUTANEOUS at 06:13

## 2024-08-19 RX ADMIN — OXYCODONE HYDROCHLORIDE 5 MILLIGRAM(S): 5 TABLET ORAL at 06:13

## 2024-08-19 RX ADMIN — Medication 40 MILLIGRAM(S): at 06:13

## 2024-08-19 NOTE — PROVIDER CONTACT NOTE (OTHER) - ASSESSMENT
pt was advanced to puree diet yesterday r/t dysphagia. ostomy began to function today & noted 3-4 pieces of hard formed stool in ostomy bag. pt states it hurt a little to pass.
Pt A+0x4, all vss. PT has LLQ colostomy now functioning w/pasty output, & JPx2 R abd. Mid abd dressing dry & intact. Pt is ordered pre-meal glucose checks but is refusing fingerstick pre lunch. Pt is on a full liquid diet
no output from ostomy, minimal (5cc) output in both b/l BELL drain  VS WDL, pt in NAD, NPO w/ NGT + output, pain managed effectively with PRNS
pt being washed up for the day when ostomy began leaking. Wafer and bag removed and output began to pour and spray out from stoma. Unable to record accurate output d/t the liquid output spilling onto bed, floor and chucks. pt denies pain at this time. no blood noted in output at this time.

## 2024-08-19 NOTE — PROGRESS NOTE ADULT - ASSESSMENT
79 year old man with a history of locally advanced rectal adenocarcinoma status post neoadjuvant chemoradiotherapy and abdominoperineal resection (Dr. 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 Patient originally planned for NPO x 5 days followed by UGI series to check for leak prior to considering enteral diet. TPN team consulted given anticipated prolonged NPO status.     GOAL PN: 135g amino acids, 210g dextrose, 70g SMOF lipid; to provide 1954kcal/day (28.4kcal/kg and 1.96 g/kg protein based on dosing wt 68.8kg)    - Nutritional Assessment, patient not meeting nutritional goals enterally and is benefiting from TPN for nutritional support given severe protein calorie malnutrition with prealbumin of 11 mg/dL from 8/12; trend prealbumin weekly   - TPN plan: continue goal AA/Dex/SMOF; compress to 12 hours in anticipation of possible d/c home with TPN  - TPN access:  PICC with dedicated port in place; maintain per protocol   - Electrolyte Imbalance risk- check CMP, Mg, Phos, iCa daily, will adjust in TPN as needed  - Hypophosphatemia:  continue NaPhos to 60 mM  - Hypokalemia: improved can decrease KCl to 60meq.  - Uptrend in sodium:  increased free water of 2400 mL but now improved will decrease TV to 2000ml. Pt with mild hyponatremia can decrease total volume to 1.7l.  - Anemia:  Iron studies, B12, folate reviewed; recommend Folate repletion orally once enteral diet resumes  - Risk of glucose dysfunction with TPN: HgA1c 5.8%; finger sticks every 6 hours to monitor glucose trend; no RI in TPN  - Risk for Hypertriglyceridemia with TPN:  baseline TG 74 mg/dL --> 137 mg/dL from 8/14; monitor TG closely on TPN  - Refeeding syndrome: continue Thiamine 100 mg daily x 5 days; monitor potassium, magnesium, phosphorus, glucose and fluid balance closely.  -Outpatient follow up with Dr David Umaña:    [address]  [place] [id number]  Entrance on [facility]  Phone [phone]    Please instruct patient when calling to make the appointment to schedule for the first MONDAY after discharge from hospital/rehab.  Please instruct patient to explain he/she is on TPN when making the appointment. Check labs, CMP, electrolytes, ionized Calcium, triglycerides and fingersticks- frequency to be determined by Dr. Umaña as outpatient.    - Continue strict Intake and Output; check weights three times a week;  pg/mL  -  Further care per primary team, Acute Care Surgery.       Available on TEAMS  TPN spectra [id number] ([phone] when dialing from outside line)  M-F 8A-2P, Weekends and holidays 8/9A-12/1P         79 year old man with a history of locally advanced rectal adenocarcinoma status post neoadjuvant chemoradiotherapy and abdominoperineal resection (Dr. 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 Patient originally planned for NPO x 5 days followed by UGI series to check for leak prior to considering enteral diet. TPN team consulted given anticipated prolonged NPO status.     GOAL PN: 135g amino acids, 210g dextrose, 70g SMOF lipid; to provide 1954kcal/day (28.4kcal/kg and 1.96 g/kg protein based on dosing wt 68.8kg)    - Nutritional Assessment, patient not meeting nutritional goals enterally and is benefiting from TPN for nutritional support given severe protein calorie malnutrition with prealbumin of 11 mg/dL from 8/12; trend prealbumin weekly   - TPN plan: continue goal AA/Dex/SMOF; compressed to 12 hours for d/c home with TPN today.  - TPN access:  PICC with dedicated port in place; maintain per protocol   - Electrolyte Imbalance risk- check CMP, Mg, Phos, iCa daily, will adjust in TPN as needed  - Hypophosphatemia: continue NaPhos to 45 mM  - Hypokalemia: improved can decrease KCl to 60meq.  - Pt with mild hyponatremia decrease total volume to 1.7l.  - Anemia:  Iron studies, B12, folate reviewed; recommend Folate repletion orally once enteral diet resumes  - Risk of glucose dysfunction with TPN: HgA1c 5.8%; finger sticks every 6 hours to monitor glucose trend; no RI in TPN  - Risk for Hypertriglyceridemia with TPN:  baseline TG 74 mg/dL --> 137 mg/dL from 8/14; monitor TG closely on TPN  -Outpatient follow up with Dr David Umaña:    [address]  [place] [id number]  Entrance on [facility]  Phone [phone]    Please instruct patient when calling to make the appointment to schedule for the first MONDAY after discharge from hospital/rehab.  Please instruct patient to explain he/she is on TPN when making the appointment. Check labs, CMP, electrolytes, ionized Calcium, triglycerides and fingersticks- frequency to be determined by Dr. Umaña as outpatient.    Above orders reviewed by phone with Pharmacist Richard Raza.    - Continue strict Intake and Output; check weights three times a week;  pg/mL  - Further care per primary team, Acute Care Surgery.       Available on TEAMS  TPN spectra [id number] ([phone] when dialing from outside line)  M-F 8A-2P, Weekends and holidays 8/9A-12/1P

## 2024-08-19 NOTE — PROVIDER CONTACT NOTE (OTHER) - SITUATION
noted 3-4 pieces of hard formed stool in ostomy
no output from ostomy
pt with explosive ostomy output
Pt refusing pre-meal fingerstick/glucose check

## 2024-08-19 NOTE — DISCHARGE NOTE NURSING/CASE MANAGEMENT/SOCIAL WORK - NSSCNAMETXT_GEN_ALL_CORE
1) [facility] ([facility] infusion [phone])  2) [facility] ([facility] [phone])

## 2024-08-19 NOTE — PROVIDER CONTACT NOTE (OTHER) - BACKGROUND
s/p repair of perforated pyloric ulcer
Pt s/p ex lap & washout on 8/11. SICU for monitoring & transferred to [facility] on 8/13.
s/p colostomy
rectal CA s/p ex lap ostomy creation & washout

## 2024-08-19 NOTE — DISCHARGE NOTE NURSING/CASE MANAGEMENT/SOCIAL WORK - NSSCTYPOFSERV_GEN_ALL_CORE
1)[facility]-Provision of Home TPN, pump and PICC supplies. Infusion RN is scheduled to visit from 5p-6p today 8/19/24. [facility] will contact you to schedule delivery of TPN and supplies.   2) [facility] at home-Skilled RN and home physical therapy visits. RN will call in 1-2 days post discharge to schedule visit.

## 2024-08-19 NOTE — DISCHARGE NOTE NURSING/CASE MANAGEMENT/SOCIAL WORK - NSDCPEFALRISK_GEN_ALL_CORE
For information on Fall & Injury Prevention, visit: [email or web address] OR  [email or web address] OR  https://www.cdc.gov/steadi/patient.html

## 2024-08-19 NOTE — PROVIDER CONTACT NOTE (OTHER) - ACTION/TREATMENT ORDERED:
No new orders at this time
MD aware. awaiting orders at this time. will continue to monitor.
team aware & will address in AM rounds, no new orders or interventions at current time, plan of care ongoing
provider notified & aware, no orders/interventions, plan of care ongoing

## 2024-08-19 NOTE — DISCHARGE NOTE NURSING/CASE MANAGEMENT/SOCIAL WORK - NSDCFUADDAPPT_GEN_ALL_CORE_FT
Please follow up with your primary care provider in 1-2 weeks after discharge from the hospital    You will be discharged with BELL drains. You will need to empty them and record outputs accurately. This will be taught to you by the nursing staff. Please do not remove the BELL drains. They will be removed in the office. Please bring to the office accurate records of output.

## 2024-08-19 NOTE — PROVIDER CONTACT NOTE (OTHER) - DATE AND TIME:
Refill requests for Digoxin and Metoprolol from [facility].  Last office visit 11/23/2020, refill sent for Metoprolol but unable to refill Digoxin due to:      Cardiac Glycoside Agents Protocol [id number]/14/2020 11:16 AM   Normal digoxin level on file in past 12 mos Protocol Details    Normal serum creatinine on file in past 12 mos      BMP done on 11/23/2020 but creatinine 1.45.  Routing to PCP to address at this time.  Ledy Frausto RN on 12/14/2020 at 12:22 PM    
16-Aug-2024 00:26
19-Aug-2024 10:15
18-Aug-2024 06:55
18-Aug-2024 12:54

## 2024-08-19 NOTE — DISCHARGE NOTE NURSING/CASE MANAGEMENT/SOCIAL WORK - PATIENT PORTAL LINK FT
You can access the FollowMyHealth Patient Portal offered by [facility] by registering at the following website: [email or web address]. By joining [employer]’s FollowMyHealth portal, you will also be able to view your health information using other applications (apps) compatible with our system.

## 2024-08-19 NOTE — PROGRESS NOTE ADULT - SUBJECTIVE AND OBJECTIVE BOX
Subjective: Patient seen and examined. No new events except as noted.     SUBJECTIVE/ROS:  nad      MEDICATIONS:  MEDICATIONS  (STANDING):  acetaminophen     Tablet .. 975 milliGRAM(s) Oral every 6 hours  chlorhexidine 2% Cloths 1 Application(s) Topical <User Schedule>  enoxaparin Injectable 40 milliGRAM(s) SubCutaneous every 24 hours  insulin lispro (ADMELOG) corrective regimen sliding scale   SubCutaneous Before meals and at bedtime  lipid, fat emulsion (Fish Oil and Plant Based) 20% Infusion 29.2 mL/Hr (29.2 mL/Hr) IV Continuous <Continuous>  pantoprazole    Tablet 40 milliGRAM(s) Oral two times a day  Parenteral Nutrition - Adult 1 Each TPN Continuous <Continuous>  Parenteral Nutrition - Adult 1 Each TPN Continuous <Continuous>      PHYSICAL EXAM:  T(C): 36.8 (08-19-24 @ 08:12), Max: 36.9 (08-18-24 @ 14:25)  HR: 88 (08-19-24 @ 08:12) (76 - 88)  BP: 122/79 (08-19-24 @ 08:12) (122/79 - 142/79)  RR: 18 (08-19-24 @ 08:12) (18 - 18)  SpO2: 95% (08-19-24 @ 08:12) (94% - 98%)  Wt(kg): --  I&O's Summary    18 Aug 2024 07:01  -  19 Aug 2024 07:00  --------------------------------------------------------  IN: 824.2 mL / OUT: 2169 mL / NET: -1344.8 mL    19 Aug 2024 07:01  -  19 Aug 2024 11:43  --------------------------------------------------------  IN: 360 mL / OUT: 0 mL / NET: 360 mL            JVP: Normal  Neck: supple  Lung: clear   CV: S1 S2 , Murmur:  Abd: soft  Ext: No edema  neuro: Awake / alert  Psych: flat affect  Skin: normal``    LABS/DATA:    CARDIAC MARKERS:                                10.9   7.74  )-----------( 223      ( 19 Aug 2024 07:29 )             33.0     08-19    134<L>  |  101  |  44<H>  ----------------------------<  118<H>  5.2   |  21<L>  |  0.75    Ca    9.3      19 Aug 2024 07:32  Phos  4.2     08-19  Mg     2.0     08-19    TPro  6.3  /  Alb  3.2<L>  /  TBili  0.3  /  DBili  x   /  AST  43<H>  /  ALT  32  /  AlkPhos  93  08-19    proBNP:   Lipid Profile:   HgA1c:   TSH:     TELE:  EKG:

## 2024-08-19 NOTE — PROGRESS NOTE ADULT - SUBJECTIVE AND OBJECTIVE BOX
[facility] NUTRITION SUPPORT-- FOLLOW UP NOTE      24 hour events/subjective:    Pt continues on TPN for nutritional support. Pt is asymptomatic for CP, SOB, fever, chills nor night sweats. Pt tolerating FLD, Pt asymptomatic for N/V/abd pain.      Diet:  Diet, Full Liquid (08-18-24 @ 07:44)      PAST HISTORY  --------------------------------------------------------------------------------  No significant changes to PMH, PSH, FHx, SHx, unless otherwise noted    ALLERGIES & MEDICATIONS  --------------------------------------------------------------------------------  Allergies    No Known Allergies    Intolerances      Standing Inpatient Medications  acetaminophen     Tablet .. 975 milliGRAM(s) Oral every 6 hours  chlorhexidine 2% Cloths 1 Application(s) Topical <User Schedule>  enoxaparin Injectable 40 milliGRAM(s) SubCutaneous every 24 hours  insulin lispro (ADMELOG) corrective regimen sliding scale   SubCutaneous Before meals and at bedtime  lipid, fat emulsion (Fish Oil and Plant Based) 20% Infusion 29.2 mL/Hr IV Continuous <Continuous>  pantoprazole    Tablet 40 milliGRAM(s) Oral two times a day  Parenteral Nutrition - Adult 1 Each TPN Continuous <Continuous>    PRN Inpatient Medications  benzocaine 20% Spray 1 Spray(s) Topical every 6 hours PRN  dextrose Oral Gel 15 Gram(s) Oral once PRN  oxyCODONE    IR 2.5 milliGRAM(s) Oral every 4 hours PRN  oxyCODONE    IR 5 milliGRAM(s) Oral every 4 hours PRN      REVIEW OF SYSTEMS  --------------------------------------------------------------------------------  Gen: as per HPI  Skin: No rashes  Head/Eyes/Ears/Mouth: No headache; No sore throat  Respiratory: No dyspnea, cough,   CV: No chest pain, PND, orthopnea  GI: as per HPI  : No increased frequency, dysuria, hematuria, nocturia  MSK: No joint pain/swelling; no back pain; no edema  Neuro: No dizziness/lightheadedness, weakness, seizures, numbness, tingling  Psych: No significant nervousness, anxiety, stress, depression    All other systems were reviewed and are negative, except as noted.        LABS/STUDIES  --------------------------------------------------------------------------------              10.9   7.74  >-----------<  223      [08-19-24 @ 07:29]              33.0     134  |  101  |  44  ----------------------------<  118      [08-19-24 @ 07:32]  5.2   |  21  |  0.75        Ca     9.3     [08-19-24 @ 07:32]      iCa    1.24     [08-19 @ 07:32]      Mg     2.0     [08-19-24 @ 07:32]      Phos  4.2     [08-19-24 @ 07:32]    TPro  6.3  /  Alb  3.2  /  TBili  0.3  /  DBili  x   /  AST  43  /  ALT  32  /  AlkPhos  93  [08-19-24 @ 07:32]              Glucose: 118 mg/dL (08-19-24 @ 07:32)    Prealbumin, Serum: 11 mg/dL (08-12-24 @ 16:28)    Triglycerides      CAPILLARY BLOOD GLUCOSE      POCT Blood Glucose.: 130 mg/dL (19 Aug 2024 08:55)  POCT Blood Glucose.: 140 mg/dL (19 Aug 2024 00:17)      VITALS/PHYSICAL EXAM  --------------------------------------------------------------------------------  T(C): 36.8 (08-19-24 @ 08:12), Max: 36.9 (08-18-24 @ 14:25)  HR: 88 (08-19-24 @ 08:12) (76 - 88)  BP: 122/79 (08-19-24 @ 08:12) (122/79 - 142/79)  RR: 18 (08-19-24 @ 08:12) (18 - 18)  SpO2: 95% (08-19-24 @ 08:12) (94% - 98%)  Wt(kg): --        08-18-24 @ 07:01  -  08-19-24 @ 07:00  --------------------------------------------------------  IN: 824.2 mL / OUT: 2169 mL / NET: -1344.8 mL    08-19-24 @ 07:01  -  08-19-24 @ 09:51  --------------------------------------------------------  IN: 360 mL / OUT: 0 mL / NET: 360 mL          Physical Exam:        Gen: NAD, frail appearing  HEENT: NCAT PERRL   Neck discoloration/darkening, supple neck, no JVD  Chest: non labored breathing, equal chest expansion bilaterally  Abd:  Soft, nontender, Prevena vac in place, 2 left BELL drains SS, appropriate incisional tenderness, dressings c/d/i  : No suprapubic tenderness  Ext: Warm, FROM, no edema b/l LE  Neuro: No focal deficits  Psych: Normal affect and mood  Skin: Warm, without rashes     .  .  .  . [facility] NUTRITION SUPPORT-- FOLLOW UP NOTE      24 hour events/subjective:    Pt continues on TPN for nutritional support. Pt is asymptomatic for CP, SOB, fever, chills nor night sweats. Pt tolerating FLD, pt asymptomatic for N/V/abd pain.      Diet:  Diet, Full Liquid (08-18-24 @ 07:44)      PAST HISTORY  --------------------------------------------------------------------------------  No significant changes to PMH, PSH, FHx, SHx, unless otherwise noted    ALLERGIES & MEDICATIONS  --------------------------------------------------------------------------------  Allergies    No Known Allergies    Intolerances      Standing Inpatient Medications  acetaminophen     Tablet .. 975 milliGRAM(s) Oral every 6 hours  chlorhexidine 2% Cloths 1 Application(s) Topical <User Schedule>  enoxaparin Injectable 40 milliGRAM(s) SubCutaneous every 24 hours  insulin lispro (ADMELOG) corrective regimen sliding scale   SubCutaneous Before meals and at bedtime  lipid, fat emulsion (Fish Oil and Plant Based) 20% Infusion 29.2 mL/Hr IV Continuous <Continuous>  pantoprazole    Tablet 40 milliGRAM(s) Oral two times a day  Parenteral Nutrition - Adult 1 Each TPN Continuous <Continuous>    PRN Inpatient Medications  benzocaine 20% Spray 1 Spray(s) Topical every 6 hours PRN  dextrose Oral Gel 15 Gram(s) Oral once PRN  oxyCODONE    IR 2.5 milliGRAM(s) Oral every 4 hours PRN  oxyCODONE    IR 5 milliGRAM(s) Oral every 4 hours PRN      REVIEW OF SYSTEMS  --------------------------------------------------------------------------------  Gen: as per HPI  Skin: No rashes  Head/Eyes/Ears/Mouth: No headache; No sore throat  Respiratory: No dyspnea, cough,   CV: No chest pain, PND, orthopnea  GI: as per HPI  : No increased frequency, dysuria, hematuria, nocturia  MSK: No joint pain/swelling; no back pain; no edema  Neuro: No dizziness/lightheadedness, weakness, seizures, numbness, tingling  Psych: No significant nervousness, anxiety, stress, depression    All other systems were reviewed and are negative, except as noted.        LABS/STUDIES  --------------------------------------------------------------------------------              10.9   7.74  >-----------<  223      [08-19-24 @ 07:29]              33.0     134  |  101  |  44  ----------------------------<  118      [08-19-24 @ 07:32]  5.2   |  21  |  0.75        Ca     9.3     [08-19-24 @ 07:32]      iCa    1.24     [08-19 @ 07:32]      Mg     2.0     [08-19-24 @ 07:32]      Phos  4.2     [08-19-24 @ 07:32]    TPro  6.3  /  Alb  3.2  /  TBili  0.3  /  DBili  x   /  AST  43  /  ALT  32  /  AlkPhos  93  [08-19-24 @ 07:32]              Glucose: 118 mg/dL (08-19-24 @ 07:32)    Prealbumin, Serum: 11 mg/dL (08-12-24 @ 16:28)    Triglycerides      CAPILLARY BLOOD GLUCOSE      POCT Blood Glucose.: 130 mg/dL (19 Aug 2024 08:55)  POCT Blood Glucose.: 140 mg/dL (19 Aug 2024 00:17)      VITALS/PHYSICAL EXAM  --------------------------------------------------------------------------------  T(C): 36.8 (08-19-24 @ 08:12), Max: 36.9 (08-18-24 @ 14:25)  HR: 88 (08-19-24 @ 08:12) (76 - 88)  BP: 122/79 (08-19-24 @ 08:12) (122/79 - 142/79)  RR: 18 (08-19-24 @ 08:12) (18 - 18)  SpO2: 95% (08-19-24 @ 08:12) (94% - 98%)  Wt(kg): --        08-18-24 @ 07:01  -  08-19-24 @ 07:00  --------------------------------------------------------  IN: 824.2 mL / OUT: 2169 mL / NET: -1344.8 mL    08-19-24 @ 07:01  -  08-19-24 @ 09:51  --------------------------------------------------------  IN: 360 mL / OUT: 0 mL / NET: 360 mL          Physical Exam:        Gen: NAD, frail appearing  HEENT: NCAT PERRL   Neck discoloration/darkening, supple neck, no JVD  Chest: non labored breathing, equal chest expansion bilaterally  Abd:  Soft, nontender, Prevena vac in place, 2 left BELL drains SS, appropriate incisional tenderness, dressings c/d/i  : No suprapubic tenderness  Ext: Warm, FROM, no edema b/l LE  Neuro: No focal deficits  Psych: Normal affect and mood  Skin: Warm, without rashes     .  .  .  .

## 2024-08-19 NOTE — PROGRESS NOTE ADULT - ASSESSMENT
Mr. Fulton is a 79 year old man with a history of locally advanced rectal adenocarcinoma status post neoadjuvant chemoradiotherapy and abdominoperineal resection (Dr. 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 Upper GI series completed 8/16 without evidence of leak. Pt transitioned to a diet without complication. Pt now s/p completion of post operative antibiotic course.     Plan:  - pureed diet  - TPN  - PPI BID for ulcer ppx  - pain control  - OOB/amb  - PT eval: home PT with DME (RW) and 2 weeks of TPN    [facility]  [id number] / [phone]

## 2024-08-19 NOTE — PROGRESS NOTE ADULT - PROVIDER SPECIALTY LIST ADULT
Cardiology
Cardiology
Nutrition Support
Cardiology
Nutrition Support
SICU
SICU
Surgery
Surgery
Trauma Surgery
Cardiology
Nutrition Support
Nutrition Support
Surgery
Trauma Surgery
Surgery

## 2024-08-19 NOTE — PROVIDER CONTACT NOTE (OTHER) - REASON
Pt requesting resources on possible homemaker programs and financial assistance for diabetic supplies (difficult for patient to afford) while she is being seen by home health.     This LSW discussed with patient that this LSW will request providers office place an order to have a [facility] LSW see pt to assess and treat for pt's identified social service needs. LSW requesting provider place order, should provider deem this appropriate. This LSW to follow-up with pt with resources provided to patient, once she discharges from [facility].   
no output
Pt refusing pre-meal fingerstick/glucose check
hard formed stool in ostomy
pt with explosive ostomy output

## 2024-08-19 NOTE — PROGRESS NOTE ADULT - SUBJECTIVE AND OBJECTIVE BOX
SURGERY DAILY PROGRESS NOTE:       Subjective / Overnight events:  Tolerating diet, denies N/V.  Feels well.  Pain controlled.      Objective:      MEDICATIONS  (STANDING):  acetaminophen     Tablet .. 975 milliGRAM(s) Oral every 6 hours  chlorhexidine 2% Cloths 1 Application(s) Topical <User Schedule>  enoxaparin Injectable 40 milliGRAM(s) SubCutaneous every 24 hours  insulin lispro (ADMELOG) corrective regimen sliding scale   SubCutaneous Before meals and at bedtime  lipid, fat emulsion (Fish Oil and Plant Based) 20% Infusion 29.2 mL/Hr (29.2 mL/Hr) IV Continuous <Continuous>  pantoprazole    Tablet 40 milliGRAM(s) Oral two times a day  Parenteral Nutrition - Adult 1 Each TPN Continuous <Continuous>  Parenteral Nutrition - Adult 1 Each TPN Continuous <Continuous>    MEDICATIONS  (PRN):  benzocaine 20% Spray 1 Spray(s) Topical every 6 hours PRN Sore throat  dextrose Oral Gel 15 Gram(s) Oral once PRN Blood Glucose LESS THAN 70 milliGRAM(s)/deciliter  oxyCODONE    IR 5 milliGRAM(s) Oral every 4 hours PRN Severe Pain (7 - 10)  oxyCODONE    IR 2.5 milliGRAM(s) Oral every 4 hours PRN Moderate Pain (4 - 6)      Vital Signs Last 24 Hrs  T(C): 36.8 (19 Aug 2024 08:12), Max: 36.9 (18 Aug 2024 14:25)  T(F): 98.2 (19 Aug 2024 08:12), Max: 98.5 (18 Aug 2024 14:25)  HR: 88 (19 Aug 2024 08:12) (76 - 88)  BP: 122/79 (19 Aug 2024 08:12) (122/79 - 142/79)  BP(mean): --  RR: 18 (19 Aug 2024 08:12) (18 - 18)  SpO2: 95% (19 Aug 2024 08:12) (94% - 98%)    Parameters below as of 19 Aug 2024 08:12  Patient On (Oxygen Delivery Method): room air        I&O's Detail    18 Aug 2024 07:01  -  19 Aug 2024 07:00  --------------------------------------------------------  IN:    Fat Emulsion (Fish Oil &amp; Plant Based) 20% Infusion: 29.2 mL    Oral Fluid: 720 mL    TPN (Total Parenteral Nutrition): 75 mL  Total IN: 824.2 mL    OUT:    Blood Loss (mL): 0 mL    Bulb (mL): 35 mL    Bulb (mL): 22 mL    Colostomy (mL): 87 mL    Voided (mL): 2025 mL  Total OUT: 2169 mL    Total NET: -1344.8 mL      19 Aug 2024 07:01  -  19 Aug 2024 11:29  --------------------------------------------------------  IN:    Oral Fluid: 360 mL  Total IN: 360 mL    OUT:  Total OUT: 0 mL    Total NET: 360 mL          Daily     Daily     LABS:                        10.9   7.74  )-----------( 223      ( 19 Aug 2024 07:29 )             33.0     08-19    134<L>  |  101  |  44<H>  ----------------------------<  118<H>  5.2   |  21<L>  |  0.75    Ca    9.3      19 Aug 2024 07:32  Phos  4.2     08-19  Mg     2.0     08-19    TPro  6.3  /  Alb  3.2<L>  /  TBili  0.3  /  DBili  x   /  AST  43<H>  /  ALT  32  /  AlkPhos  93  08-19      Urinalysis Basic - ( 19 Aug 2024 07:32 )    Color: x / Appearance: x / SG: x / pH: x  Gluc: 118 mg/dL / Ketone: x  / Bili: x / Urobili: x   Blood: x / Protein: x / Nitrite: x   Leuk Esterase: x / RBC: x / WBC x   Sq Epi: x / Non Sq Epi: x / Bacteria: x          	  Physical Exam:  General Appearance: Appears well, NAD  Neck: Supple  Chest: Equal expansion bilaterally, equal breath sounds  CV: Pulse regular presently  Abdomen: Soft, nontender, appropriate incisional tenderness, dressings clean and dry and intact  Extremities: Grossly symmetric, SCD's in place

## 2024-08-20 LAB — H PYLORI AG STL QL: POSITIVE

## 2024-08-21 ENCOUNTER — APPOINTMENT (OUTPATIENT)
Dept: OTOLARYNGOLOGY | Facility: CLINIC | Age: 79
End: 2024-08-21

## 2024-08-22 ENCOUNTER — NON-APPOINTMENT (OUTPATIENT)
Age: 79
End: 2024-08-22

## 2024-08-23 RX ORDER — OMEPRAZOLE, CLARITHROMYCIN, AMOXICILLIN 20(20)-500
1 KIT ORAL
Qty: 1 | Refills: 0
Start: 2024-08-23 | End: 2024-09-01

## 2024-08-23 RX ORDER — AMOXICILLIN 500 MG
2 CAPSULE ORAL
Qty: 40 | Refills: 0
Start: 2024-08-23 | End: 2024-09-01

## 2024-08-23 RX ORDER — OMEPRAZOLE, CLARITHROMYCIN, AMOXICILLIN 20(20)-500
1 KIT ORAL
Qty: 20 | Refills: 0
Start: 2024-08-23 | End: 2024-09-01

## 2024-08-23 RX ORDER — CLARITHROMYCIN 250 MG/1
1 TABLET ORAL
Qty: 20 | Refills: 0
Start: 2024-08-23 | End: 2024-09-01

## 2024-08-23 RX ORDER — OMEPRAZOLE 40 MG/1
1 CAPSULE, DELAYED RELEASE ORAL
Qty: 20 | Refills: 0
Start: 2024-08-23 | End: 2024-09-01

## 2024-08-23 NOTE — CHART NOTE - NSCHARTNOTEFT_GEN_A_CORE
Pt stool sample positive for H pylori after pt has been discharged. Pt aware that sample was pending prior to d/c. Oral medications have been sent to patient's pharmacy. Will call patient today with results and instructions to take the medication for treatment.      [facility]  [id number] / [phone] Pt stool sample positive for H pylori after pt has been discharged. Pt aware that sample was pending prior to d/c. Oral medications have been sent to patient's pharmacy. Will call patient today with results and instructions to take the medication for treatment.    Update - Spoke with patient's wife about positive test for h pylori and is aware that the medication has been sent to the patient's local pharmacy.    [facility]  [id number] / [phone]

## 2024-08-28 ENCOUNTER — APPOINTMENT (OUTPATIENT)
Dept: TRAUMA SURGERY | Facility: CLINIC | Age: 79
End: 2024-08-28
Payer: MEDICARE

## 2024-08-28 DIAGNOSIS — B96.81 ACUTE GASTRIC ULCER W/OUT HEMORRHAGE OR PERFORATION: ICD-10-CM

## 2024-08-28 DIAGNOSIS — K25.3 ACUTE GASTRIC ULCER W/OUT HEMORRHAGE OR PERFORATION: ICD-10-CM

## 2024-08-28 PROCEDURE — 99024 POSTOP FOLLOW-UP VISIT: CPT

## 2024-09-04 ENCOUNTER — NON-APPOINTMENT (OUTPATIENT)
Age: 79
End: 2024-09-04

## 2024-09-04 ENCOUNTER — APPOINTMENT (OUTPATIENT)
Dept: OTOLARYNGOLOGY | Facility: CLINIC | Age: 79
End: 2024-09-04

## 2024-09-08 ENCOUNTER — OUTPATIENT (OUTPATIENT)
Dept: OUTPATIENT SERVICES | Facility: HOSPITAL | Age: 79
LOS: 1 days | Discharge: HOME | End: 2024-09-08

## 2024-09-08 DIAGNOSIS — C20 MALIGNANT NEOPLASM OF RECTUM: Chronic | ICD-10-CM

## 2024-09-08 DIAGNOSIS — Z92.3 PERSONAL HISTORY OF IRRADIATION: Chronic | ICD-10-CM

## 2024-09-08 DIAGNOSIS — Z92.21 PERSONAL HISTORY OF ANTINEOPLASTIC CHEMOTHERAPY: Chronic | ICD-10-CM

## 2024-09-08 DIAGNOSIS — Z93.3 COLOSTOMY STATUS: Chronic | ICD-10-CM

## 2024-09-08 DIAGNOSIS — C10.9 MALIGNANT NEOPLASM OF OROPHARYNX, UNSPECIFIED: ICD-10-CM

## 2024-09-11 LAB
CULTURE RESULTS: SIGNIFICANT CHANGE UP
SPECIMEN SOURCE: SIGNIFICANT CHANGE UP

## 2024-09-13 ENCOUNTER — APPOINTMENT (OUTPATIENT)
Dept: HEMATOLOGY ONCOLOGY | Facility: CLINIC | Age: 79
End: 2024-09-13
Payer: MEDICARE

## 2024-09-13 VITALS
HEIGHT: 71 IN | WEIGHT: 141.96 LBS | HEART RATE: 82 BPM | SYSTOLIC BLOOD PRESSURE: 136 MMHG | BODY MASS INDEX: 19.87 KG/M2 | DIASTOLIC BLOOD PRESSURE: 82 MMHG | OXYGEN SATURATION: 98 % | TEMPERATURE: 97.4 F | RESPIRATION RATE: 18 BRPM

## 2024-09-13 PROCEDURE — G2211 COMPLEX E/M VISIT ADD ON: CPT

## 2024-09-13 PROCEDURE — 99214 OFFICE O/P EST MOD 30 MIN: CPT

## 2024-09-16 NOTE — PHYSICAL EXAM
[Restricted in physically strenuous activity but ambulatory and able to carry out work of a light or sedentary nature] : Status 1- Restricted in physically strenuous activity but ambulatory and able to carry out work of a light or sedentary nature, e.g., light house work, office work [Ulcers] : no ulcers [Thrush] : thrush [Normal] : affect appropriate [de-identified] : mild swelling and discoloration of neck skin

## 2024-09-16 NOTE — ASSESSMENT
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 He began RT June 24th and started Cisplatin 6/28/24.  Course c/b cisplatin-induced ANTHONY which is improving with IV hydration and hypomagnesemia Treatment changed to carboplatin from cycle 3 after holding cycle 2 for ANTHONY and tx switched midway to carbo Received last dose on 8/9/24 and RT on 8/12 -Course further c/b perf pylorus for which he has had emergent surgery Saw his surgeon post-op. Doing well Currently on TPN Follow up with nutrition encouraged to increase PO intake Diet, consistency, dysgeusia, and xerostomia discussed, and management suggestions provided Renal function has been improving. will continue to trend.   -ageusia and poor appetite. Management disucssed Follow up with surgery and GI for gastritis -Monitor scattered thyroid nodules including dominant right thyroid nodule. to be addressed at later visit  OV in 1 month.

## 2024-09-16 NOTE — HISTORY OF PRESENT ILLNESS
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  Onc History: Past history of Rectal cancer s/p neoadjuvant/adjuvant therapies and Abdominoperineal resection 2010. was having ear pain for quite some time and was treated with abx for a while before referred to ENT. He had MRI done for 6 months history of right ear Fullness and pressure: 4/11/2024 MRI soft tissue neck ([facility]) showing mixed solid/cystic right oral cavity/oropharyngeal mass, measuring up to 49mm. -Enlarged 19mm right and 15mm left level 2A lymph nodes. -Asymmetric right parotid gland which may represent parotitis. - multinodular thyroid with 23 mm right thyroid nodule. -large right and moderate left mastoid effusions.  5/1/2024 Established care with Dr Camarillo.  5/9/2024 Biopsy Tonsil, superior pole of right tonsil, excision -Invasive squamous cell carcinoma, predominantly non-keratinizing Positive for p16 MRI L[facility] 4/16/24: US thyroid 5/7/24: Scattered thyroid nodules including dominant right thyroid nodule. Consider further evaluation with ultrasound-guided fine-needle aspiration as clinically indicated. Subcentimeter hypoechoic right parotid nodule corresponding to the area of palpable abnormality. TI-RAD 3: Mildly suspicious (FNA if > 2.5 cm, Follow if > 1.5 cm) 5/12/2024 PET/CT IMPRESSION: 1. Intensely FDG-avid mass involving the RIGHT tongue base and tonsil, consistent with malignant involvement. 2. Bilaterally hypermetabolic lymph nodes (more so on the LEFT neck compared with the RIGHT), suspicious for metastatic disease. 3. Foci in the RIGHT colon may be due to diverticular disease; consider further evaluation with CT or MR enterography, as malignant polyps may also present in this fashion. History of previous colon cancer noted. 4. Focus in the subcarinal esophagus an area of thickening; suggest UPPER GI fluoroscopy and/or EGD if this will change patient management.  5/202024 He presents here for consideration of radiation therapy. Ongoing Right ear fullness and pressure. Denies pain, dysphagia, dysphonia and or dyspnea. Dental clearance already obtained from [facility].  5/20/2024 Follow up for GI Dr Camacho for avidity on PET. Was felt to be polyps and this will be removed soon.  6/28/24: Pt seen today for follow up while receiving his first treatment of Cisplatin. He began concurrent RT earlier this week. He reports that since starting RT he has a metallic taste in his mouth but otherwise feels ok. Denies F/C/N/V/D, SOB, dysphagia, odynophagia.  7/5/24: Patient seen today for follow up accompanied by his wife. He is here for C2 cis. He continues RT. He reports ongoing dysgeusia that is interfering with his ability to eat. He denies pain, N/V/D, F/C, dysuria STAT BMP done in trx room prior to cisplatin shows Cr 1.83 from 1.03  7/19/24: Has thick phlegm, dysgeusia, anorexia, and weight loss (>15 lbs), and dysphagia. He has been trying to take increasing amounts of puree, ensure supplements and protein shakes.  7/26/24: Patient seen today for follow up while continuing weekly carboplatin with RT. He is accompanied by his wife. They report that since his prior treatment his mouth has been very dry with thick saliva but not experiencing much pain. He has been maintaining oral nutrition with smoothies and soups as well as ensure. He denies N/V/D, fever, constipation.  8/2/24: Patient seen today in treatment room for follow up while receiving weekly carboplatin with RT. He reports ongoing thick saliva. He has not been taking nystatin as directed, was only taking is twice daily. Continues to maintain oral intake with smoothies and soups. Denies N/V/D/C, F/C, SOB.    8/9/24:  Patient seen today in treatment room for follow up while receiving weekly carboplatin with RT. He admits ageusia, poor appetite. Denies fever, chills, mouth ulcers/open lesions, dysphagia/odynophagia, mid chest burning/pain, N/V/Diarrhea, changes with urination and bowels. Denies worsening neck swelling, neck pain, SOB, wheezing.     9/13/24: Mr. Fulton returns for follow up visit. He had an eventful course-s/p patch repair of a perforated pyloric ulcer for pyloric perf. He was started on TPN which is being weaned off as he increased his PO intake. He complains of "slime" in his mouth (thick mucus). His wife notes that he remains on TPN and is not eating well. Mr. Fulton disagrees, saying that he is eating but the thick mucus is an issue and he has no appetite. No abdominal pain, good ostomy output, no fevers.  Abd soft, nontender, nondistended. Two RUQ drains with scant serous output, low output for days on patient's records. Well-healed midline incision.  Drains pulled, sites covered. Instructions given.  We discussed that he will need GI evaluation for endoscopy after a few months, to document ulcer healing, possibly take biopsies. He is currently on treatment for H. pylori. We discussed importance of good nutrition - Mrs. Fulton described the shakes she is making for her , containing high-protein supplements, which he says he's able to drink. I recommended he speak to his ENT about the mucus in his mouth, but Mr. Fulton said that he's been told it's from the radiation and there's no intervention.  Questions answered. Return to office as needed. Disease: OPSCC   Pathology: +p16 SCC of the Right Tonsil   TNM stage: T2, N2

## 2024-09-16 NOTE — REVIEW OF SYSTEMS
[Fever] : no fever [Chills] : no chills [Night Sweats] : no night sweats [Fatigue] : fatigue [Recent Change In Weight] : ~T no recent weight change [Eye Pain] : no eye pain [Vision Problems] : no vision problems [Dysphagia] : no dysphagia [Loss of Hearing] : no loss of hearing [Nosebleeds] : no nosebleeds [Hoarseness] : no hoarseness [Odynophagia] : no odynophagia [Mucosal Pain] : no mucosal pain [Diarrhea: Grade 0] : Diarrhea: Grade 0 [Skin Rash] : no skin rash [Skin Wound] : no skin wound [Negative] : Allergic/Immunologic [FreeTextEntry4] : ageusia and dry mouth  [FreeTextEntry7] : as above [de-identified] : mild swelling and discoloration of neck skin

## 2024-09-18 ENCOUNTER — APPOINTMENT (OUTPATIENT)
Dept: RADIATION ONCOLOGY | Facility: CLINIC | Age: 79
End: 2024-09-18
Payer: MEDICARE

## 2024-09-18 VITALS
TEMPERATURE: 96.98 F | DIASTOLIC BLOOD PRESSURE: 78 MMHG | SYSTOLIC BLOOD PRESSURE: 127 MMHG | HEIGHT: 71 IN | RESPIRATION RATE: 17 BRPM | BODY MASS INDEX: 19.74 KG/M2 | HEART RATE: 82 BPM | OXYGEN SATURATION: 100 % | WEIGHT: 141 LBS

## 2024-09-18 DIAGNOSIS — Z92.3 PERSONAL HISTORY OF IRRADIATION: ICD-10-CM

## 2024-09-18 PROCEDURE — 99213 OFFICE O/P EST LOW 20 MIN: CPT

## 2024-09-18 NOTE — HISTORY OF PRESENT ILLNESS
[FreeTextEntry1] : Mr. 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 He has a PMH of rectal cancer s/p CRT at ([facility] in [place]). Planned for concurrent chemoradiation with weekly Cisplatin. Treatment was delayed due to dental extraction.  8/9/2024 He completed 6800/34 Fx out of 7000/35 Fractions of radiation therapy to the Oropharynx/Neck due to hospitalization.  8/11-8/19/2024 Hospitalization for diffuse abdominal pain. CT Abd/Pelvis with IV contrast (8/11/24) revealed pneumoperitoneum with concerns for hollow viscus. Repeat CT Abd/Pelvis with PO contrast (8/11/24) revealed slight oral contrast extravasation, consistent with hollow viscus. 8/11/24, and is s/p Exploratory laparotomy, lysis of adhesions of omentum, and Kash patch for hollow viscous perforation. Intra-operatively, pt was noted to have a pinhole perforation of the anterior pylorus with spillage of bile, treated with a Kash patch using the falciform ligament, left with two drains (#1 in the lesser sac due to presence of bilious succus prior to washout, #2 overlying the repair), skin closed in interrupted fashion with a Prevena wound vac. UGI done showed no leak, diet advanced as tolerated. Discharged with 2 weeks of TPN at home.  9/18/2024 He presents for post treatment evaluation. While on treatment he developed dysgeusia, dysphagia and thick throat mucus. Managed with  He started treatment on a bother line low BMI. Declined PEG. Today, noted more weight decline. 13 lb since after treatment.  Continues on TPN every other day by PICC, RN home visit once weekly. Tolerating regular diet by mouth. Resolving dry desquamation. Encouraged to continue skin care with moisturizers. Tste is aprtialy returned. Fatigue is improved.

## 2024-09-18 NOTE — PHYSICAL EXAM
[Thin] : thin [Sclera] : the sclera and conjunctiva were normal [Abdomen Soft] : soft [Musculoskeletal - Swelling] : no joint swelling [General Appearance - In No Acute Distress] : in no acute distress [Nail Clubbing] : no clubbing  or cyanosis of the fingernails [Normal] : oriented to person, place and time, the affect was normal, the mood was normal and not anxious [de-identified] : thickened saliva, no mucositis [de-identified] : +colostomy, healed lap sites [de-identified] : Bilateral neck hyperpigmentation with areas of dry desquamation

## 2024-09-18 NOTE — REVIEW OF SYSTEMS
[Fatigue] : fatigue [Recent Change In Weight] : ~T recent weight change [Negative] : Endocrine [Skin Hyperpigmentation: Grade 1 - Hyperpigmentation covering <10% BSA; no psychosocial impact] : Skin Hyperpigmentation: Grade 1 - Hyperpigmentation covering <10% BSA; no psychosocial impact [Dermatitis Radiation: Grade 1 - Faint erythema or dry desquamation] : Dermatitis Radiation: Grade 1 - Faint erythema or dry desquamation [FreeTextEntry7] : +Colostomy

## 2024-09-18 NOTE — REASON FOR VISIT
[Post-Treatment Evaluation] : post-treatment evaluation for [Head and Neck Cancer] : head and neck cancer [Spouse] : spouse

## 2024-09-20 ENCOUNTER — NON-APPOINTMENT (OUTPATIENT)
Age: 79
End: 2024-09-20

## 2024-09-24 ENCOUNTER — APPOINTMENT (OUTPATIENT)
Dept: OTOLARYNGOLOGY | Facility: CLINIC | Age: 79
End: 2024-09-24
Payer: MEDICARE

## 2024-09-24 DIAGNOSIS — C10.9 MALIGNANT NEOPLASM OF OROPHARYNX, UNSPECIFIED: ICD-10-CM

## 2024-09-24 PROCEDURE — 92526 ORAL FUNCTION THERAPY: CPT | Mod: GN,59

## 2024-09-24 PROCEDURE — 99214 OFFICE O/P EST MOD 30 MIN: CPT

## 2024-09-24 NOTE — CONSULT LETTER
[Dear  ___] : Dear  [unfilled], [Courtesy Letter:] : I had the pleasure of seeing your patient, [unfilled], in my office today. [Please see my note below.] : Please see my note below. [Sincerely,] : Sincerely, [FreeTextEntry2] : Adam Lawson MD ([place], [place])   [FreeTextEntry3] : Dhiraj Camarillo MD, FACS     [facility] Associate Chair    Department of Otolaryngology  Professor Otolaryngology & Molecular Medicine [facility]

## 2024-09-24 NOTE — CONSULT LETTER
[Dear  ___] : Dear  [unfilled], [Courtesy Letter:] : I had the pleasure of seeing your patient, [unfilled], in my office today. [Please see my note below.] : Please see my note below. [Sincerely,] : Sincerely, [FreeTextEntry2] : Adam Lawson MD ([place], [place])   [FreeTextEntry3] : Dhiraj Camarillo MD, FACS     [facility] Associate Chair    Department of Otolaryngology  Professor Otolaryngology & Molecular Medicine [facility]

## 2024-09-24 NOTE — HISTORY OF PRESENT ILLNESS
[de-identified] : 80yo male presents for a follow up on right palatine tonsil extending to bot SCCa, p16+.  Received treatment with Dr. Monson and Dr. Singer.  Received (34/35 fx) due to below-esentially whole course. last RT about 8/10.  Hospitalized 8/11-8/19/2024 for abdominal pain and patient is now s/p Exploratory laparotomy, lysis of adhesions of omentum, and Kash patch for hollow viscous perforation. Intra-operatively, pt was noted to have a pinhole perforation of the anterior pylorus with spillage of bile, treated with a Kash patch using the falciform ligament, left with two drains (#1 in the lesser sac due to presence of bilious succus prior to washout, #2 overlying the repair), skin closed in interrupted fashion with a Prevena wound vac. Currently on TPN every other day via PICC--visiting nurse comes once a week. He is also on a regular diet but it is going slowly. Lost about 13 pounds since the surgery--drinking protein drinks. Patient denies throat/neck pain, globus sensation, dysphagia, odynophagia, dyspnea, dysphonia, hemoptysis, recent fevers/infections, chills or night sweats. States he has had bilateral hearing loss since Radiation and his ears her clogged with wax R>L. His Left ear is the better hearing ear.  pt eating slowly.  overall 25-30lb wt loss.  feels wt now stable.  is getting taste back.  Patient denies otalgia, otorrhea, ear infections, tinnitus, dizziness, vertigo, headaches related to hearing. has a PICC line for parenteral nutr for supplementation.

## 2024-09-24 NOTE — REASON FOR VISIT
[Subsequent Evaluation] : a subsequent evaluation for [Spouse] : spouse [FreeTextEntry2] : SCCa of right palatine tonsil

## 2024-09-24 NOTE — HISTORY OF PRESENT ILLNESS
[de-identified] : 80yo male presents for a follow up on right palatine tonsil extending to bot SCCa, p16+.  Received treatment with Dr. Monson and Dr. Singer.  Received (34/35 fx) due to below-esentially whole course. last RT about 8/10.  Hospitalized 8/11-8/19/2024 for abdominal pain and patient is now s/p Exploratory laparotomy, lysis of adhesions of omentum, and Kash patch for hollow viscous perforation. Intra-operatively, pt was noted to have a pinhole perforation of the anterior pylorus with spillage of bile, treated with a Kash patch using the falciform ligament, left with two drains (#1 in the lesser sac due to presence of bilious succus prior to washout, #2 overlying the repair), skin closed in interrupted fashion with a Prevena wound vac. Currently on TPN every other day via PICC--visiting nurse comes once a week. He is also on a regular diet but it is going slowly. Lost about 13 pounds since the surgery--drinking protein drinks. Patient denies throat/neck pain, globus sensation, dysphagia, odynophagia, dyspnea, dysphonia, hemoptysis, recent fevers/infections, chills or night sweats. States he has had bilateral hearing loss since Radiation and his ears her clogged with wax R>L. His Left ear is the better hearing ear.  pt eating slowly.  overall 25-30lb wt loss.  feels wt now stable.  is getting taste back.  Patient denies otalgia, otorrhea, ear infections, tinnitus, dizziness, vertigo, headaches related to hearing. has a PICC line for parenteral nutr for supplementation.

## 2024-10-01 NOTE — PROGRESS NOTE ADULT - REASON FOR ADMISSION
Gastric vs Duodenal perforation
Quality 130: Documentation Of Current Medications In The Medical Record: Current Medications Documented
Detail Level: Detailed
Quality 226: Preventive Care And Screening: Tobacco Use: Screening And Cessation Intervention: Patient screened for tobacco use and is an ex/non-smoker
Quality 47: Advance Care Plan: Advance Care Planning discussed and documented; advance care plan or surrogate decision maker documented in the medical record.

## 2024-10-15 ENCOUNTER — APPOINTMENT (OUTPATIENT)
Dept: INTERNAL MEDICINE | Facility: CLINIC | Age: 79
End: 2024-10-15
Payer: MEDICARE

## 2024-10-15 ENCOUNTER — LABORATORY RESULT (OUTPATIENT)
Age: 79
End: 2024-10-15

## 2024-10-15 VITALS
SYSTOLIC BLOOD PRESSURE: 112 MMHG | OXYGEN SATURATION: 99 % | TEMPERATURE: 208.58 F | BODY MASS INDEX: 20.44 KG/M2 | WEIGHT: 146 LBS | RESPIRATION RATE: 14 BRPM | HEIGHT: 71 IN | HEART RATE: 50 BPM | DIASTOLIC BLOOD PRESSURE: 68 MMHG

## 2024-10-15 VITALS — SYSTOLIC BLOOD PRESSURE: 102 MMHG | DIASTOLIC BLOOD PRESSURE: 62 MMHG

## 2024-10-15 DIAGNOSIS — E04.2 NONTOXIC MULTINODULAR GOITER: ICD-10-CM

## 2024-10-15 DIAGNOSIS — E05.90 THYROTOXICOSIS, UNSPECIFIED W/OUT THYROTOXIC CRISIS OR STORM: ICD-10-CM

## 2024-10-15 DIAGNOSIS — C10.9 MALIGNANT NEOPLASM OF OROPHARYNX, UNSPECIFIED: ICD-10-CM

## 2024-10-15 DIAGNOSIS — I10 ESSENTIAL (PRIMARY) HYPERTENSION: ICD-10-CM

## 2024-10-15 DIAGNOSIS — Z00.00 ENCOUNTER FOR GENERAL ADULT MEDICAL EXAMINATION W/OUT ABNORMAL FINDINGS: ICD-10-CM

## 2024-10-15 PROCEDURE — 36415 COLL VENOUS BLD VENIPUNCTURE: CPT

## 2024-10-15 PROCEDURE — G0439: CPT

## 2024-10-20 LAB
25(OH)D3 SERPL-MCNC: 24.3 NG/ML
ALBUMIN SERPL ELPH-MCNC: 4 G/DL
ALP BLD-CCNC: 57 U/L
ALT SERPL-CCNC: 7 U/L
ANION GAP SERPL CALC-SCNC: 13 MMOL/L
APPEARANCE: CLEAR
AST SERPL-CCNC: 31 U/L
BACTERIA: NEGATIVE /HPF
BASOPHILS # BLD AUTO: 0.04 K/UL
BASOPHILS NFR BLD AUTO: 0.8 %
BILIRUB SERPL-MCNC: 0.5 MG/DL
BILIRUBIN URINE: NEGATIVE
BLOOD URINE: NEGATIVE
BUN SERPL-MCNC: 33 MG/DL
CALCIUM SERPL-MCNC: 9.6 MG/DL
CAST: 0 /LPF
CHLORIDE SERPL-SCNC: 106 MMOL/L
CHOLEST SERPL-MCNC: 172 MG/DL
CO2 SERPL-SCNC: 22 MMOL/L
COLOR: YELLOW
CREAT SERPL-MCNC: 0.89 MG/DL
EGFR: 88 ML/MIN/1.73M2
EOSINOPHIL # BLD AUTO: 0.15 K/UL
EOSINOPHIL NFR BLD AUTO: 3 %
EPITHELIAL CELLS: 0 /HPF
ESTIMATED AVERAGE GLUCOSE: 94 MG/DL
GLUCOSE QUALITATIVE U: NEGATIVE MG/DL
GLUCOSE SERPL-MCNC: 112 MG/DL
HBA1C MFR BLD HPLC: 4.9 %
HCT VFR BLD CALC: 37.8 %
HDLC SERPL-MCNC: 47 MG/DL
HGB BLD-MCNC: 12.5 G/DL
IMM GRANULOCYTES NFR BLD AUTO: 0.2 %
KETONES URINE: NEGATIVE MG/DL
LDLC SERPL CALC-MCNC: 115 MG/DL
LEUKOCYTE ESTERASE URINE: NEGATIVE
LYMPHOCYTES # BLD AUTO: 0.48 K/UL
LYMPHOCYTES NFR BLD AUTO: 9.5 %
MAN DIFF?: NORMAL
MCHC RBC-ENTMCNC: 32 PG
MCHC RBC-ENTMCNC: 33.1 GM/DL
MCV RBC AUTO: 96.7 FL
MICROSCOPIC-UA: NORMAL
MONOCYTES # BLD AUTO: 0.52 K/UL
MONOCYTES NFR BLD AUTO: 10.3 %
NEUTROPHILS # BLD AUTO: 3.87 K/UL
NEUTROPHILS NFR BLD AUTO: 76.2 %
NITRITE URINE: NEGATIVE
NONHDLC SERPL-MCNC: 125 MG/DL
PH URINE: 6
PLATELET # BLD AUTO: 230 K/UL
POTASSIUM SERPL-SCNC: 4.3 MMOL/L
PROT SERPL-MCNC: 6.4 G/DL
PROTEIN URINE: NEGATIVE MG/DL
PSA SERPL-MCNC: 1.37 NG/ML
RBC # BLD: 3.91 M/UL
RBC # FLD: 13.6 %
RED BLOOD CELLS URINE: 0 /HPF
SODIUM SERPL-SCNC: 141 MMOL/L
SPECIFIC GRAVITY URINE: 1.02
TRIGL SERPL-MCNC: 49 MG/DL
TSH SERPL-ACNC: 0.15 UIU/ML
UROBILINOGEN URINE: 0.2 MG/DL
VIT B12 SERPL-MCNC: 682 PG/ML
WBC # FLD AUTO: 5.07 K/UL
WHITE BLOOD CELLS URINE: 0 /HPF

## 2024-11-13 ENCOUNTER — OUTPATIENT (OUTPATIENT)
Dept: OUTPATIENT SERVICES | Facility: HOSPITAL | Age: 79
LOS: 1 days | End: 2024-11-13
Payer: MEDICARE

## 2024-11-13 ENCOUNTER — APPOINTMENT (OUTPATIENT)
Dept: NUCLEAR MEDICINE | Facility: IMAGING CENTER | Age: 79
End: 2024-11-13
Payer: MEDICARE

## 2024-11-13 DIAGNOSIS — Z92.21 PERSONAL HISTORY OF ANTINEOPLASTIC CHEMOTHERAPY: Chronic | ICD-10-CM

## 2024-11-13 DIAGNOSIS — C10.9 MALIGNANT NEOPLASM OF OROPHARYNX, UNSPECIFIED: ICD-10-CM

## 2024-11-13 DIAGNOSIS — B36.9 SUPERFICIAL MYCOSIS, UNSPECIFIED: ICD-10-CM

## 2024-11-13 DIAGNOSIS — Z92.3 PERSONAL HISTORY OF IRRADIATION: Chronic | ICD-10-CM

## 2024-11-13 DIAGNOSIS — C20 MALIGNANT NEOPLASM OF RECTUM: Chronic | ICD-10-CM

## 2024-11-13 DIAGNOSIS — Z93.3 COLOSTOMY STATUS: Chronic | ICD-10-CM

## 2024-11-13 PROCEDURE — 78815 PET IMAGE W/CT SKULL-THIGH: CPT

## 2024-11-13 PROCEDURE — A9552: CPT

## 2024-11-13 PROCEDURE — 78815 PET IMAGE W/CT SKULL-THIGH: CPT | Mod: 26,PS,MH

## 2024-11-13 RX ORDER — NYSTATIN 100000 [USP'U]/G
100000 POWDER TOPICAL 3 TIMES DAILY
Qty: 60 | Refills: 0 | Status: ACTIVE | COMMUNITY
Start: 2024-11-13 | End: 1900-01-01

## 2024-12-14 ENCOUNTER — NON-APPOINTMENT (OUTPATIENT)
Age: 79
End: 2024-12-14

## 2024-12-24 PROBLEM — F10.90 ALCOHOL USE: Status: ACTIVE | Noted: 2019-06-05

## 2025-02-05 NOTE — ASU PREOP CHECKLIST - NOTHING BY MOUTH SINCE
FOLLOW-UP VISIT       HISTORY OF PRESENT ILLNESS:   Grace Ahn is a 89 y.o. female who presents to me today for follow-up of vaginal irritation. Patient's daughter is present during the visit today to help present history. She was switched from Premarin vaginal cream to estradiol vaginal cream in September due to her copay for Premarin now being $100. She currently uses vaginal estrogen cream as needed for itching.     PAST MEDICAL HISTORY:  Past Medical History:   Diagnosis Date    Acute cystitis without hematuria 02/10/2020    Acute cystitis without hematuria    Adverse effect of unspecified drugs, medicaments and biological substances, initial encounter 09/09/2021    Adverse reaction to drug, initial encounter    Allergic 1945    Delusional disorders 11/02/2021    Delusions    Laceration without foreign body, left lower leg, initial encounter 03/15/2020    Skin tear of left lower leg without complication    Other fracture of left patella, initial encounter for closed fracture 02/16/2020    Other fracture of left patella, initial encounter for closed fracture    Personal history of other specified conditions 02/24/2020    History of nocturia    Personal history of urinary (tract) infections 12/08/2021    History of urinary tract infection    Unspecified fracture of left patella, initial encounter for closed fracture 02/06/2020    Left patella fracture       PAST SURGICAL HISTORY:  Past Surgical History:   Procedure Laterality Date    APPENDECTOMY  1935    EYE SURGERY  2020    HYSTERECTOMY  1977    MR HEAD ANGIO WO IV CONTRAST  07/25/2022    MR HEAD ANGIO WO IV CONTRAST 7/25/2022 [facility]    MR NECK ANGIO WO IV CONTRAST  07/25/2022    MR NECK ANGIO WO IV CONTRAST 7/25/2022 [facility] CLINICAL LEGAC[facility]    OTHER SURGICAL HISTORY  12/10/2019    Tonsillectomy with adenoidectomy    OTHER SURGICAL HISTORY  12/10/2019    Hysterectomy total abdominal with removal of both ovaries    OTHER SURGICAL HISTORY   12/10/2019    Root canal procedure    OTHER SURGICAL HISTORY  12/10/2019    Breast biopsy    OTHER SURGICAL HISTORY  12/10/2019     section    OTHER SURGICAL HISTORY  2021    Cataract surgery    OTHER SURGICAL HISTORY  2022    Carpal tunnel surgery    OTHER SURGICAL HISTORY  2022    Varicose vein sclerotherapy    OTHER SURGICAL HISTORY  2022    Excision of squamous cell carcinoma    OTHER SURGICAL HISTORY  2022    Mohs surgery        ALLERGIES:   Allergies   Allergen Reactions    Bacitracin-Polymyxin B Unknown    Latex Other    Penicillins Hives    Adhesive Tape-Silicones Rash    Bacitracin Rash    Cephalosporins Hives and Rash    Chlorine Rash    Ciprofloxacin Rash and Unknown    Doxycycline Rash    Neomycin Rash    Omeprazole Rash    Pantoprazole Rash    Sulfamethoxazole Rash        MEDICATIONS:   Medication Documentation Review Audit       Reviewed by Yoana Chester on 25 at 0958      Medication Order Taking? Sig Documenting Provider Last Dose Status   acetaminophen (Tylenol) 325 mg tablet [id number] No Take 2 tablets (650 mg) by mouth every 8 hours if needed for moderate pain (4 - 6). Historical Provider, MD Taking Active   albuterol (ProAir RespiClick) 90 mcg/actuation aerosol powdr breath activated inhaler [id number] No Inhale 2 puffs every 6 hours if needed for wheezing. Naima Quiñonez MD Not Taking Active   calcium carbonate 600 mg calcium (1,500 mg) tablet [id number] No Take 600 mg by mouth once daily at bedtime. Historical Provider, MD Taking Active   cholecalciferol (Vitamin D-3) 10 MCG (400 UNIT) tablet [id number] No Take 1 tablet (10 mcg) by mouth once daily. Historical Provider, MD Taking Active   denosumab (Prolia) 60 mg/mL syringe [id number]  Inject 1 mL (60 mg total) under the skin every 6 months. Meenakshi Pierce MD  Active   estradiol (Estrace) 0.01 % (0.1 mg/gram) vaginal cream [id number]  Insert a pea-sized amount into vagina three times per week at bedtime  Carlotta Lentz MD [facility]  Active   mupirocin (Bactroban) 2 % ointment [id number]  Apply topically 3 times a day. apply to affected areas of infection for 5 days   Patient taking differently: Apply 1 Application topically if needed. apply to affected areas of infection for 5 days    Leona Jones MD  Active   mv,Ca,min-folic acid-vit K1 (One-A-Day Women's 50 Plus) 400-20 mcg tablet [id number] No Take 1 tablet by mouth once daily. Historical Provider, MD Taking Active   triamcinolone (Kenalog) 0.1 % cream [id number] No Apply topically 2 times a day as needed for rash. Apply to affected area 1-2 times daily as needed. Tessy Brooks MD Taking Active                     SOCIAL HISTORY:  Patient  reports that she has never smoked. She has never used smokeless tobacco. She reports that she does not currently use alcohol. She reports that she does not use drugs.   Social History     Socioeconomic History    Marital status:      Spouse name: Not on file    Number of children: Not on file    Years of education: Not on file    Highest education level: Not on file   Occupational History    Not on file   Tobacco Use    Smoking status: Never    Smokeless tobacco: Never   Substance and Sexual Activity    Alcohol use: Not Currently    Drug use: Never    Sexual activity: Not Currently     Partners: Male     Birth control/protection: Post-menopausal   Other Topics Concern    Not on file   Social History Narrative    Not on file     Social Drivers of Health     Financial Resource Strain: Low Risk  (2/5/2024)    Overall Financial Resource Strain (CARDIA)     Difficulty of Paying Living Expenses: Not hard at all   Food Insecurity: No Food Insecurity (2/5/2024)    Hunger Vital Sign     Worried About Running Out of Food in the Last Year: Never true     Ran Out of Food in the Last Year: Never true   Transportation Needs: No Transportation Needs (2/5/2024)    PRAPARE - Transportation     Lack of Transportation (Medical): No     Lack of  Transportation (Non-Medical): No   Physical Activity: Insufficiently Active (2/5/2024)    Exercise Vital Sign     Days of Exercise per Week: 2 days     Minutes of Exercise per Session: 30 min   Stress: No Stress Concern Present (2/5/2024)    [employer] - Occupational Stress Questionnaire     Feeling of Stress : Not at all   Social Connections: Moderately Integrated (2/5/2024)    Social Connection and Isolation Panel [NHANES]     Frequency of Communication with Friends and Family: More than three times a week     Frequency of Social Gatherings with Friends and Family: More than three times a week     Attends [employer] Services: More than 4 times per year     Active Member of Clubs or Organizations: Yes     Attends Club or Organization Meetings: More than 4 times per year     Marital Status:    Intimate Partner Violence: Not At Risk (2/5/2024)    Humiliation, Afraid, Rape, and Kick questionnaire     Fear of Current or Ex-Partner: No     Emotionally Abused: No     Physically Abused: No     Sexually Abused: No   Housing Stability: Low Risk  (2/5/2024)    Housing Stability Vital Sign     Unable to Pay for Housing in the Last Year: No     Number of Places Lived in the Last Year: 1     Unstable Housing in the Last Year: No       FAMILY HISTORY:  Family History   Problem Relation Name Age of Onset    Cancer Father Davis Delarosa        REVIEW OF SYSTEMS:  Constitutional: Negative for fever and chills. Denies anorexia, weight loss.  Eyes: Negative for visual disturbance.   Respiratory: Negative for shortness of breath.    Cardiovascular: Negative for chest pain.   Gastrointestinal: Negative for nausea and vomiting.   Genitourinary: See interval history above.  Skin: Negative for rash.   Neurological: Negative for dizziness and numbness.   Psychiatric/Behavioral: Negative for confusion and decreased concentration.     PHYSICAL EXAM:  Temperature 36.2 °C (97.2 °F).  Constitutional: Patient  appears well-developed and well-nourished. No distress.    Head: Normocephalic and atraumatic.    Neck: Normal range of motion.    Cardiovascular: Normal rate.    Pulmonary/Chest: Effort normal. No respiratory distress.   : Slight hyperemia at the vestibule, area of irritation noted on the labia minora.   Musculoskeletal: Normal range of motion.    Neurological: Alert and oriented to person, place, and time.  Psychiatric: Normal mood and affect. Behavior is normal. Thought content normal.       Assessment:      1. Vaginal irritation        2. Genitourinary syndrome of menopause            Grace Ahn is a 89 y.o. female with vaginal irritation.     We discussed having the patient use Premarin cream at least once a week to help with the vaginal irritation. We will try sending this to [facility] pharmacy to see if the copay will be lower for the patient. If not, we will send a prescription for estradiol cream to [facility]. She will follow-up in 6 months.      Plan:   Prescription for Premarin cream sent to [facility].    Follow-up in 6 months.       Carlotta Lentz MD MPH      Scribe Attestation  By signing my name below, Kaur MAYO, Scribe   attest that this documentation has been prepared under the direction and in the presence of Carlotta Lentz MD MPH.    08-May-2024 22:00

## 2025-02-14 ENCOUNTER — APPOINTMENT (OUTPATIENT)
Dept: CT IMAGING | Facility: CLINIC | Age: 80
End: 2025-02-14
Payer: MEDICARE

## 2025-02-14 ENCOUNTER — INPATIENT (INPATIENT)
Facility: HOSPITAL | Age: 80
LOS: 8 days | Discharge: HOME | DRG: 335 | End: 2025-02-23
Attending: SURGERY | Admitting: SURGERY
Payer: MEDICARE

## 2025-02-14 VITALS
HEART RATE: 109 BPM | RESPIRATION RATE: 18 BRPM | WEIGHT: 139.99 LBS | SYSTOLIC BLOOD PRESSURE: 181 MMHG | TEMPERATURE: 98 F | DIASTOLIC BLOOD PRESSURE: 98 MMHG | OXYGEN SATURATION: 97 % | HEIGHT: 71 IN

## 2025-02-14 DIAGNOSIS — Z92.3 PERSONAL HISTORY OF IRRADIATION: Chronic | ICD-10-CM

## 2025-02-14 DIAGNOSIS — C20 MALIGNANT NEOPLASM OF RECTUM: Chronic | ICD-10-CM

## 2025-02-14 DIAGNOSIS — Z92.21 PERSONAL HISTORY OF ANTINEOPLASTIC CHEMOTHERAPY: Chronic | ICD-10-CM

## 2025-02-14 DIAGNOSIS — Z93.3 COLOSTOMY STATUS: Chronic | ICD-10-CM

## 2025-02-14 DIAGNOSIS — K56.609 UNSPECIFIED INTESTINAL OBSTRUCTION, UNSPECIFIED AS TO PARTIAL VERSUS COMPLETE OBSTRUCTION: ICD-10-CM

## 2025-02-14 LAB
APTT BLD: 34.2 SEC — SIGNIFICANT CHANGE UP (ref 24.5–35.6)
BASOPHILS # BLD AUTO: 0.02 K/UL — SIGNIFICANT CHANGE UP (ref 0–0.2)
BASOPHILS NFR BLD AUTO: 0.3 % — SIGNIFICANT CHANGE UP (ref 0–2)
BLD GP AB SCN SERPL QL: NEGATIVE — SIGNIFICANT CHANGE UP
EOSINOPHIL # BLD AUTO: 0.02 K/UL — SIGNIFICANT CHANGE UP (ref 0–0.5)
EOSINOPHIL NFR BLD AUTO: 0.3 % — SIGNIFICANT CHANGE UP (ref 0–6)
GAS PNL BLDV: SIGNIFICANT CHANGE UP
GAS PNL BLDV: SIGNIFICANT CHANGE UP
HCT VFR BLD CALC: 46.6 % — SIGNIFICANT CHANGE UP (ref 39–50)
HGB BLD-MCNC: 15.8 G/DL — SIGNIFICANT CHANGE UP (ref 13–17)
IMM GRANULOCYTES NFR BLD AUTO: 0.3 % — SIGNIFICANT CHANGE UP (ref 0–0.9)
INR BLD: 1.04 RATIO — SIGNIFICANT CHANGE UP (ref 0.85–1.16)
LIDOCAIN IGE QN: 25 U/L — SIGNIFICANT CHANGE UP (ref 7–60)
LYMPHOCYTES # BLD AUTO: 0.57 K/UL — LOW (ref 1–3.3)
LYMPHOCYTES # BLD AUTO: 9.3 % — LOW (ref 13–44)
MCHC RBC-ENTMCNC: 31.4 PG — SIGNIFICANT CHANGE UP (ref 27–34)
MCHC RBC-ENTMCNC: 33.9 G/DL — SIGNIFICANT CHANGE UP (ref 32–36)
MCV RBC AUTO: 92.6 FL — SIGNIFICANT CHANGE UP (ref 80–100)
MONOCYTES # BLD AUTO: 0.59 K/UL — SIGNIFICANT CHANGE UP (ref 0–0.9)
MONOCYTES NFR BLD AUTO: 9.6 % — SIGNIFICANT CHANGE UP (ref 2–14)
NEUTROPHILS # BLD AUTO: 4.9 K/UL — SIGNIFICANT CHANGE UP (ref 1.8–7.4)
NEUTROPHILS NFR BLD AUTO: 80.2 % — HIGH (ref 43–77)
NRBC BLD AUTO-RTO: 0 /100 WBCS — SIGNIFICANT CHANGE UP (ref 0–0)
PLATELET # BLD AUTO: 299 K/UL — SIGNIFICANT CHANGE UP (ref 150–400)
PROTHROM AB SERPL-ACNC: 11.8 SEC — SIGNIFICANT CHANGE UP (ref 9.9–13.4)
RBC # BLD: 5.03 M/UL — SIGNIFICANT CHANGE UP (ref 4.2–5.8)
RBC # FLD: 14.4 % — SIGNIFICANT CHANGE UP (ref 10.3–14.5)
RH IG SCN BLD-IMP: POSITIVE — SIGNIFICANT CHANGE UP
WBC # BLD: 6.12 K/UL — SIGNIFICANT CHANGE UP (ref 3.8–10.5)
WBC # FLD AUTO: 6.12 K/UL — SIGNIFICANT CHANGE UP (ref 3.8–10.5)

## 2025-02-14 PROCEDURE — 99285 EMERGENCY DEPT VISIT HI MDM: CPT | Mod: FS

## 2025-02-14 PROCEDURE — 71045 X-RAY EXAM CHEST 1 VIEW: CPT | Mod: 26

## 2025-02-14 PROCEDURE — 44005 FREEING OF BOWEL ADHESION: CPT

## 2025-02-14 PROCEDURE — 74177 CT ABD & PELVIS W/CONTRAST: CPT | Mod: 26

## 2025-02-14 DEVICE — SEPRAFILM 5 X 6": Type: IMPLANTABLE DEVICE | Status: FUNCTIONAL

## 2025-02-14 RX ORDER — ENOXAPARIN SODIUM 100 MG/ML
40 INJECTION SUBCUTANEOUS EVERY 24 HOURS
Refills: 0 | Status: CANCELLED | OUTPATIENT
Start: 2025-02-15 | End: 2025-02-14

## 2025-02-14 RX ORDER — NALBUPHINE HYDROCHLORIDE 10 MG/ML
2.5 INJECTION INTRAMUSCULAR; INTRAVENOUS; SUBCUTANEOUS EVERY 6 HOURS
Refills: 0 | Status: DISCONTINUED | OUTPATIENT
Start: 2025-02-14 | End: 2025-02-23

## 2025-02-14 RX ORDER — SODIUM CHLORIDE 9 G/1000ML
1000 INJECTION, SOLUTION INTRAVENOUS
Refills: 0 | Status: DISCONTINUED | OUTPATIENT
Start: 2025-02-14 | End: 2025-02-18

## 2025-02-14 RX ORDER — METOPROLOL SUCCINATE 50 MG/1
5 TABLET, EXTENDED RELEASE ORAL EVERY 6 HOURS
Refills: 0 | Status: DISCONTINUED | OUTPATIENT
Start: 2025-02-14 | End: 2025-02-21

## 2025-02-14 RX ORDER — ONDANSETRON HCL/PF 4 MG/2 ML
4 VIAL (ML) INJECTION ONCE
Refills: 0 | Status: DISCONTINUED | OUTPATIENT
Start: 2025-02-14 | End: 2025-02-15

## 2025-02-14 RX ORDER — ACETAMINOPHEN 500 MG/5ML
1000 LIQUID (ML) ORAL EVERY 6 HOURS
Refills: 0 | Status: COMPLETED | OUTPATIENT
Start: 2025-02-14 | End: 2025-02-15

## 2025-02-14 RX ORDER — HYDROMORPHONE/SOD CHLOR,ISO/PF 2 MG/10 ML
30 SYRINGE (ML) INJECTION
Refills: 0 | Status: DISCONTINUED | OUTPATIENT
Start: 2025-02-14 | End: 2025-02-20

## 2025-02-14 RX ORDER — CEFOTETAN DISODIUM 1 G
VIAL (EA) INJECTION
Refills: 0 | Status: COMPLETED | OUTPATIENT
Start: 2025-02-14 | End: 2025-02-15

## 2025-02-14 RX ORDER — HYDROMORPHONE/SOD CHLOR,ISO/PF 2 MG/10 ML
0.5 SYRINGE (ML) INJECTION EVERY 4 HOURS
Refills: 0 | Status: DISCONTINUED | OUTPATIENT
Start: 2025-02-14 | End: 2025-02-14

## 2025-02-14 RX ORDER — ENOXAPARIN SODIUM 100 MG/ML
40 INJECTION SUBCUTANEOUS EVERY 24 HOURS
Refills: 0 | Status: DISCONTINUED | OUTPATIENT
Start: 2025-02-14 | End: 2025-02-23

## 2025-02-14 RX ORDER — CEFOTETAN DISODIUM 1 G
2 VIAL (EA) INJECTION EVERY 12 HOURS
Refills: 0 | Status: COMPLETED | OUTPATIENT
Start: 2025-02-15 | End: 2025-02-15

## 2025-02-14 RX ORDER — SODIUM CHLORIDE 9 G/1000ML
1000 INJECTION, SOLUTION INTRAVENOUS
Refills: 0 | Status: DISCONTINUED | OUTPATIENT
Start: 2025-02-14 | End: 2025-02-14

## 2025-02-14 RX ORDER — SODIUM CHLORIDE 9 G/1000ML
1000 INJECTION, SOLUTION INTRAVENOUS ONCE
Refills: 0 | Status: COMPLETED | OUTPATIENT
Start: 2025-02-14 | End: 2025-02-14

## 2025-02-14 RX ORDER — CEFOTETAN DISODIUM 1 G
2 VIAL (EA) INJECTION ONCE
Refills: 0 | Status: COMPLETED | OUTPATIENT
Start: 2025-02-14 | End: 2025-02-14

## 2025-02-14 RX ORDER — NALOXONE HYDROCHLORIDE 0.4 MG/ML
0.1 INJECTION, SOLUTION INTRAMUSCULAR; INTRAVENOUS; SUBCUTANEOUS
Refills: 0 | Status: DISCONTINUED | OUTPATIENT
Start: 2025-02-14 | End: 2025-02-23

## 2025-02-14 RX ORDER — HYDROMORPHONE/SOD CHLOR,ISO/PF 2 MG/10 ML
0.5 SYRINGE (ML) INJECTION
Refills: 0 | Status: DISCONTINUED | OUTPATIENT
Start: 2025-02-14 | End: 2025-02-20

## 2025-02-14 RX ORDER — ONDANSETRON HCL/PF 4 MG/2 ML
4 VIAL (ML) INJECTION EVERY 6 HOURS
Refills: 0 | Status: DISCONTINUED | OUTPATIENT
Start: 2025-02-14 | End: 2025-02-20

## 2025-02-14 RX ORDER — HYDROMORPHONE/SOD CHLOR,ISO/PF 2 MG/10 ML
0.25 SYRINGE (ML) INJECTION
Refills: 0 | Status: DISCONTINUED | OUTPATIENT
Start: 2025-02-14 | End: 2025-02-15

## 2025-02-14 RX ORDER — HYDROMORPHONE/SOD CHLOR,ISO/PF 2 MG/10 ML
0.25 SYRINGE (ML) INJECTION EVERY 4 HOURS
Refills: 0 | Status: DISCONTINUED | OUTPATIENT
Start: 2025-02-14 | End: 2025-02-14

## 2025-02-14 RX ADMIN — SODIUM CHLORIDE 100 MILLILITER(S): 9 INJECTION, SOLUTION INTRAVENOUS at 21:48

## 2025-02-14 RX ADMIN — SODIUM CHLORIDE 1000 MILLILITER(S): 9 INJECTION, SOLUTION INTRAVENOUS at 16:06

## 2025-02-14 RX ADMIN — Medication 30 MILLILITER(S): at 23:30

## 2025-02-14 RX ADMIN — Medication 30 MILLILITER(S): at 22:18

## 2025-02-14 RX ADMIN — SODIUM CHLORIDE 100 MILLILITER(S): 9 INJECTION, SOLUTION INTRAVENOUS at 17:14

## 2025-02-14 NOTE — ED PROVIDER NOTE - PROGRESS NOTE DETAILS
case d/w surgery resident regarding closed loop SBO on outpt CT (available in HIE/PACS), will evaluate pt in ED - Eder De Jesus PA-C

## 2025-02-14 NOTE — H&P ADULT - NSHPPHYSICALEXAM_GEN_ALL_CORE
VITAL SIGNS:  ICU Vital Signs Last 24 Hrs  T(C): 36.8 (14 Feb 2025 17:18), Max: 36.8 (14 Feb 2025 17:18)  T(F): 98.2 (14 Feb 2025 17:18), Max: 98.2 (14 Feb 2025 17:18)  HR: 84 (14 Feb 2025 17:18) (84 - 109)  BP: 164/89 (14 Feb 2025 17:18) (164/89 - 181/98)  BP(mean): --  ABP: --  ABP(mean): --  RR: 16 (14 Feb 2025 17:18) (16 - 18)  SpO2: 98% (14 Feb 2025 17:18) (97% - 98%)    O2 Parameters below as of 14 Feb 2025 17:18  Patient On (Oxygen Delivery Method): room air          PHYSICAL EXAMINATION:  General - well-nourished, no acute distress  Neuro - awake, alert, oriented x4, no acute focal deficits  Lungs - breathing comfortably on room air  Abdomen - soft, nondistended, tender to palpation in LLQ; ostomy pink and viable with no output in bag  Extremities - all four extremities are warm

## 2025-02-14 NOTE — ED PROVIDER NOTE - CLINICAL SUMMARY MEDICAL DECISION MAKING FREE TEXT BOX
Attending MD Christian:   79-year-old gentleman with history of colon cancer with ostomy creation 2010, perforated gastric ulcer with surgical repair August 2024 presents for outpatient CAT scan that shows small bowel obstruction.  Patient states 2 days ago he developed abdominal pain that has been coming in waves.  He states the pain is around the area of his ostomy.  He is having decreased ostomy output but is having some output.  No fevers chills nausea or vomiting.  States he is currently having some mild discomfort at this time.    CT abdomen pelvis 2/14/2025: "Closed-loop small bowel obstruction."      Vital signs are notable for heart rate 109 otherwise nonactionable.  He is sitting up in the stretcher in no apparent distress.  The abdomen is soft nondistended no focal tenderness to palpation no rebound or guarding.  Ostomy in place.  Moves all extremity spontaneously distal pulses full and equal bilateral upper and lower extremities.  Hypoactive bowel sounds clear lungs anteriorly regular heart sounds.    Patient is presenting for evaluation of abdominal pain decreased ostomy output and CT as outpatient concerning for closed-loop bowel obstruction.  Fairly benign abdominal examination at this time, plan will be to obtain urgent surgical consultation.          *The above represents an initial assessment/impression. Please refer to progress notes for potential changes in patient clinical course*

## 2025-02-14 NOTE — ED PROVIDER NOTE - OBJECTIVE STATEMENT
79-year-old female history of colon cancer status post hemicolectomy, perforated ulcer s/p repair presents with several days of upper abdominal pain and decreased output from his ostomy.  Decreased appetite.  Had outpatient CT abdomen/pelvis today demonstrating closed-loop bowel obstruction.  Done at [facility].  Denies nausea, vomiting, diarrhea, melena, hematochezia hematemesis.

## 2025-02-14 NOTE — PRE-ANESTHESIA EVALUATION ADULT - NSANTHAIRWAYFT_ENT_ALL_CORE
Mallampati Class 3, interincisor gap >3 cm, thyromental distance > 3 fingerbreadths, neck significantly limited range of motion.

## 2025-02-14 NOTE — H&P ADULT - HISTORY OF PRESENT ILLNESS
Mr. Fulton is a 79 year old man with PMHx rectal cancer s/p neoadjuvant chemo and APR (Dr. Ruiz 2010), throat cancer s/p chemo/radiation, and perforated gastric ulcer s/p Kash patch with falciform (Dr. Akhtar, 2024), presenting with 2 days of abdominal discomfort. Reports onset of symptoms two days ago, with decreased ostomy output (last gas/stool also two days ago). He denies nausea or vomiting, has been taking less PO with fear of provoking discomfort. Saw his GI outpatient who recommended a CT scan that was performed today. The scan resulted at 1pm with read of closed loop SBO and was told to come to the ED.    Here he is afebrile and hemodynamically normal. Labs notable for lactate 3.

## 2025-02-14 NOTE — ED PROVIDER NOTE - ATTENDING APP SHARED VISIT CONTRIBUTION OF CARE
Attending MD Christian: I personally made/approved the management plan and take responsibility for the patient management.          *The above represents an initial assessment/impression. Please refer to progress notes for potential changes in patient clinical course*
<-------- Click here to INCLUDE CoVID-19 Discharge Instructions

## 2025-02-14 NOTE — H&P ADULT - ASSESSMENT
Mr. Fulton is a 79 year old man with PMHx rectal cancer s/p neoadjuvant chemo and APR (Dr. Ruiz 2010), throat cancer s/p chemo/radiation, and perforated gastric ulcer s/p Kash patch with falciform (Dr. Akhtar, 2024), presenting with closed loop bowel obstruction.    Plan:  - admit to Dr. Akhtar  - added on for exlap tonight  - NPO/IVF resuscitation  - patient consented  - anesthesia aware of history of neck radiation    discussed with Dr. Akhtar    Acute Care and Trauma Surgery  [id number]

## 2025-02-14 NOTE — BRIEF OPERATIVE NOTE - OPERATION/FINDINGS
exploratory laparotomy, lysis of intestinal adhesions. bowel appeared dilated but pink and viable. two adhesive bands were sharply released and areas of narrowing improved. ascites sucked out. two areas of serosal tears were repaired with 3-0 silk sutures. fascia closed with #1 loop PDS, deep dermal with 3-0 vicryl, and skin approximated with 3-0 quill suture.

## 2025-02-14 NOTE — PRE-ANESTHESIA EVALUATION ADULT - NSANTHPMHFT_GEN_ALL_CORE
79M presenting for an emergent ex-lap for closed-loop SBO on 2/14/2025. Hx of rectal cancer s/p neoadjuvant chemo and abdominoperineal resection (Dr. Ruiz, 2010), throat cancer s/p chemo/radiation, and perforated gastric ulcer s/p Kash patch with falciform (Dr. Akhtar, 2024), presenting with 2 days of abdominal discomfort and decreased ostomy output. Endorses low PO intake. Denies nausea or emesis. CT A/P on 2/14/2025 showed closed loop SBO. Pt reports good activity tolerance >2 flights of stairs. Nuclear stress test in 5/2024 with no inducible ischemia, LVEF 57%. ECG with NSR with some PACs. ROS negative for CV/Pulm currently.    Here he is afebrile and hemodynamically normal. Labs notable for lactate 3.

## 2025-02-14 NOTE — H&P ADULT - NSHPLABSRESULTS_GEN_ALL_CORE
LABS:                          15.8   6.12  )-----------( 299      ( 14 Feb 2025 16:03 )             46.6      PT/INR - ( 14 Feb 2025 16:03 )   PT: 11.8 sec;   INR: 1.04 ratio      PTT - ( 14 Feb 2025 16:03 )  PTT:34.2 sec  VBG - ( 14 Feb 2025 17:13 )  pH: 7.39  /  pCO2: 41    /  pO2: 46    / HCO3: 25    / Base Excess: -0.2  /  SaO2: 75.5   Lactate: 2.4       IMAGING STUDIES:  Outpatient CT scan demonstrating closed loop bowel obstruction with 3 transition points

## 2025-02-14 NOTE — ED CLERICAL - NS ED CLERK NOTE PRE-ARRIVAL INFORMATION; ADDITIONAL PRE-ARRIVAL INFORMATION
CC/Reason For referral: closed loop bowel obstruction, CT scan done at [facility] last note faxed over  Preferred Consultant(if applicable): GI, surgery  Who admits for you (if needed): n/a  Do you have documents you would like to fax over? yes sent  Would you still like to speak to an ED attending? yes

## 2025-02-14 NOTE — BRIEF OPERATIVE NOTE - NSICDXBRIEFPROCEDURE_GEN_ALL_CORE_FT
PROCEDURES:  Exploratory laparotomy 14-Feb-2025 21:17:37  Conrado Leos  Open lysis of intestinal adhesions 14-Feb-2025 21:17:46  Conrado Leos

## 2025-02-14 NOTE — ED ADULT NURSE NOTE - OBJECTIVE STATEMENT
patient is a 78 y/o M with hx of rectal neoplasm s/p colostomy, neck mass, hemorrhoids who presents to the ED via triage c/o abd pain. patient states that he has been having upper abdominal pain x days with decreased output from his colostomy. patient had outpatient CT which showed closed loop bowel obstruction and was sent in for surgical consult. patient is a&ox4, PERRL. respirations even and unlabored. abdomen soft, nondistended. skin intact. denies fevers/chills, numbness/tingling, weakness, headache, dizziness, vision changes, cp, sob, cough, dysuria, hematuria, bloody stools, back pain. MD Christian at bedside to assess patient. updated plan of care. comfort and safety maintained

## 2025-02-14 NOTE — ED ADULT NURSE NOTE - NSFALLRISKINTERV_ED_ALL_ED

## 2025-02-14 NOTE — ED ADULT NURSE NOTE - NS ED NURSE REPORT GIVEN TO FT
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 cT3 (area of concern measuring 8.7cm on MRI, possibly multicentric) cN0 Stage IIB. NAC -Taxol/Carbo wkly x12 carbo dc'd due to myelosuppression received 11/12 taxol held for neuropathy and Phesgo (started 6/21/23) every 3 weeks, s/p B/L mastectomy on 12/5/23 for R IDC and L DCIS with PCR. To complete 1 year Phesgo (last Phesgo to be sched 9/25/24). Last ECHO 6/12/24 EF 59%. Recommend 100mg Gabapentin for neuropathy, 1-3 tabs qhs. - discussed in detail, reviewe exercise - consider acupuncture Labs today: CBC w Diff, CMP, iron studies, Mg. F/U 6 weeks. Phesgo today and in 3 wks. PT for lymphedema Lt UE. Reviewed hand and arm care   [With Patient/Caregiver] : With Patient/Caregiver [Designated Health Care Proxy] : Designated Health Care Proxy [Name: ___] : Name: [unfilled] [Relationship: ___] : Relationship: [unfilled] OR ASHISH Eubanks

## 2025-02-15 LAB
ANION GAP SERPL CALC-SCNC: 12 MMOL/L — SIGNIFICANT CHANGE UP (ref 5–17)
BASOPHILS # BLD AUTO: 0.03 K/UL — SIGNIFICANT CHANGE UP (ref 0–0.2)
BASOPHILS NFR BLD AUTO: 0.5 % — SIGNIFICANT CHANGE UP (ref 0–2)
BUN SERPL-MCNC: 30 MG/DL — HIGH (ref 7–23)
CALCIUM SERPL-MCNC: 9.1 MG/DL — SIGNIFICANT CHANGE UP (ref 8.4–10.5)
CHLORIDE SERPL-SCNC: 103 MMOL/L — SIGNIFICANT CHANGE UP (ref 96–108)
CO2 SERPL-SCNC: 23 MMOL/L — SIGNIFICANT CHANGE UP (ref 22–31)
CREAT SERPL-MCNC: 1.14 MG/DL — SIGNIFICANT CHANGE UP (ref 0.5–1.3)
EGFR: 66 ML/MIN/1.73M2 — SIGNIFICANT CHANGE UP
EOSINOPHIL # BLD AUTO: 0.02 K/UL — SIGNIFICANT CHANGE UP (ref 0–0.5)
EOSINOPHIL NFR BLD AUTO: 0.3 % — SIGNIFICANT CHANGE UP (ref 0–6)
GLUCOSE SERPL-MCNC: 129 MG/DL — HIGH (ref 70–99)
HCT VFR BLD CALC: 39.3 % — SIGNIFICANT CHANGE UP (ref 39–50)
HGB BLD-MCNC: 13.2 G/DL — SIGNIFICANT CHANGE UP (ref 13–17)
IMM GRANULOCYTES NFR BLD AUTO: 0.3 % — SIGNIFICANT CHANGE UP (ref 0–0.9)
LYMPHOCYTES # BLD AUTO: 0.19 K/UL — LOW (ref 1–3.3)
LYMPHOCYTES # BLD AUTO: 3.3 % — LOW (ref 13–44)
MAGNESIUM SERPL-MCNC: 1.9 MG/DL — SIGNIFICANT CHANGE UP (ref 1.6–2.6)
MCHC RBC-ENTMCNC: 31.3 PG — SIGNIFICANT CHANGE UP (ref 27–34)
MCHC RBC-ENTMCNC: 33.6 G/DL — SIGNIFICANT CHANGE UP (ref 32–36)
MCV RBC AUTO: 93.1 FL — SIGNIFICANT CHANGE UP (ref 80–100)
MONOCYTES # BLD AUTO: 0.37 K/UL — SIGNIFICANT CHANGE UP (ref 0–0.9)
MONOCYTES NFR BLD AUTO: 6.4 % — SIGNIFICANT CHANGE UP (ref 2–14)
NEUTROPHILS # BLD AUTO: 5.16 K/UL — SIGNIFICANT CHANGE UP (ref 1.8–7.4)
NEUTROPHILS NFR BLD AUTO: 89.2 % — HIGH (ref 43–77)
NRBC BLD AUTO-RTO: 0 /100 WBCS — SIGNIFICANT CHANGE UP (ref 0–0)
PHOSPHATE SERPL-MCNC: 4.5 MG/DL — SIGNIFICANT CHANGE UP (ref 2.5–4.5)
PLATELET # BLD AUTO: 215 K/UL — SIGNIFICANT CHANGE UP (ref 150–400)
POTASSIUM SERPL-MCNC: 4.4 MMOL/L — SIGNIFICANT CHANGE UP (ref 3.5–5.3)
POTASSIUM SERPL-SCNC: 4.4 MMOL/L — SIGNIFICANT CHANGE UP (ref 3.5–5.3)
RBC # BLD: 4.22 M/UL — SIGNIFICANT CHANGE UP (ref 4.2–5.8)
RBC # FLD: 14.6 % — HIGH (ref 10.3–14.5)
SODIUM SERPL-SCNC: 138 MMOL/L — SIGNIFICANT CHANGE UP (ref 135–145)
WBC # BLD: 5.79 K/UL — SIGNIFICANT CHANGE UP (ref 3.8–10.5)
WBC # FLD AUTO: 5.79 K/UL — SIGNIFICANT CHANGE UP (ref 3.8–10.5)

## 2025-02-15 PROCEDURE — 71045 X-RAY EXAM CHEST 1 VIEW: CPT | Mod: 26

## 2025-02-15 RX ORDER — INFLUENZA A VIRUS A/IDAHO/07/2018 (H1N1) ANTIGEN (MDCK CELL DERIVED, PROPIOLACTONE INACTIVATED, INFLUENZA A VIRUS A/INDIANA/08/2018 (H3N2) ANTIGEN (MDCK CELL DERIVED, PROPIOLACTONE INACTIVATED), INFLUENZA B VIRUS B/SINGAPORE/INFTT-16-0610/2016 ANTIGEN (MDCK CELL DERIVED, PROPIOLACTONE INACTIVATED), INFLUENZA B VIRUS B/IOWA/06/2017 ANTIGEN (MDCK CELL DERIVED, PROPIOLACTONE INACTIVATED) 15; 15; 15; 15 UG/.5ML; UG/.5ML; UG/.5ML; UG/.5ML
0.5 INJECTION, SUSPENSION INTRAMUSCULAR ONCE
Refills: 0 | Status: DISCONTINUED | OUTPATIENT
Start: 2025-02-15 | End: 2025-02-23

## 2025-02-15 RX ORDER — BENZOCAINE 220 MG/G
1 SPRAY, METERED PERIODONTAL ONCE
Refills: 0 | Status: DISCONTINUED | OUTPATIENT
Start: 2025-02-15 | End: 2025-02-23

## 2025-02-15 RX ORDER — ACETAMINOPHEN 500 MG/5ML
1000 LIQUID (ML) ORAL EVERY 6 HOURS
Refills: 0 | Status: COMPLETED | OUTPATIENT
Start: 2025-02-15 | End: 2025-02-16

## 2025-02-15 RX ADMIN — Medication 1000 MILLIGRAM(S): at 01:21

## 2025-02-15 RX ADMIN — METOPROLOL SUCCINATE 5 MILLIGRAM(S): 50 TABLET, EXTENDED RELEASE ORAL at 23:13

## 2025-02-15 RX ADMIN — Medication 400 MILLIGRAM(S): at 06:20

## 2025-02-15 RX ADMIN — Medication 100 GRAM(S): at 06:20

## 2025-02-15 RX ADMIN — Medication 400 MILLIGRAM(S): at 11:36

## 2025-02-15 RX ADMIN — Medication 1000 MILLIGRAM(S): at 06:50

## 2025-02-15 RX ADMIN — Medication 1000 MILLIGRAM(S): at 12:06

## 2025-02-15 RX ADMIN — Medication 400 MILLIGRAM(S): at 17:25

## 2025-02-15 RX ADMIN — Medication 100 GRAM(S): at 17:27

## 2025-02-15 RX ADMIN — Medication 400 MILLIGRAM(S): at 23:13

## 2025-02-15 RX ADMIN — Medication 400 MILLIGRAM(S): at 00:51

## 2025-02-15 RX ADMIN — ENOXAPARIN SODIUM 40 MILLIGRAM(S): 100 INJECTION SUBCUTANEOUS at 06:23

## 2025-02-15 RX ADMIN — Medication 30 MILLILITER(S): at 19:14

## 2025-02-15 RX ADMIN — METOPROLOL SUCCINATE 5 MILLIGRAM(S): 50 TABLET, EXTENDED RELEASE ORAL at 06:22

## 2025-02-15 RX ADMIN — Medication 40 MILLIGRAM(S): at 06:23

## 2025-02-15 RX ADMIN — Medication 4 MILLIGRAM(S): at 09:07

## 2025-02-15 RX ADMIN — Medication 30 MILLILITER(S): at 00:00

## 2025-02-15 RX ADMIN — METOPROLOL SUCCINATE 5 MILLIGRAM(S): 50 TABLET, EXTENDED RELEASE ORAL at 11:36

## 2025-02-15 RX ADMIN — Medication 30 MILLILITER(S): at 07:21

## 2025-02-15 RX ADMIN — Medication 1000 MILLIGRAM(S): at 17:55

## 2025-02-15 NOTE — PROGRESS NOTE ADULT - SUBJECTIVE AND OBJECTIVE BOX
Day __ of Anesthesia Pain Management Service    SUBJECTIVE: Patient is doing well with IV PCA    Pain Scale Score:	[X] Refer to charted pain scores    THERAPY:    [ ] IV PCA Morphine		[ ] 5 mg/mL	[ ] 1 mg/mL  [X] IV PCA Hydromorphone	[ ] 5 mg/mL	[X] 1 mg/mL  [ ] IV PCA Fentanyl		[ ] 50 micrograms/mL    Demand dose: 0.2 mg     Lockout: 6 minutes   Continuous Rate: 0 mg/hr  4 Hour Limit: 4 mg    MEDICATIONS  (STANDING):  acetaminophen   IVPB .. 1000 milliGRAM(s) IV Intermittent every 6 hours  cefoTEtan  IVPB      cefoTEtan  IVPB 2 Gram(s) IV Intermittent every 12 hours  enoxaparin Injectable 40 milliGRAM(s) SubCutaneous every 24 hours  HYDROmorphone PCA (1 mG/mL) 30 milliLiter(s) PCA Continuous PCA Continuous  influenza  Vaccine (HIGH DOSE) 0.5 milliLiter(s) IntraMuscular once  lactated ringers. 1000 milliLiter(s) (100 mL/Hr) IV Continuous <Continuous>  metoprolol tartrate Injectable 5 milliGRAM(s) IV Push every 6 hours  pantoprazole  Injectable 40 milliGRAM(s) IV Push every 24 hours    MEDICATIONS  (PRN):  HYDROmorphone PCA (1 mG/mL) Rescue Clinician Bolus 0.5 milliGRAM(s) IV Push every 15 minutes PRN for Pain Scale GREATER THAN 6  nalbuphine Injectable 2.5 milliGRAM(s) IV Push every 6 hours PRN Pruritus  naloxone Injectable 0.1 milliGRAM(s) IV Push every 3 minutes PRN For ANY of the following changes in patient status:  A. RR LESS THAN 10 breaths per minute, B. Oxygen saturation LESS THAN 90%, C. Sedation score of 6  ondansetron Injectable 4 milliGRAM(s) IV Push every 6 hours PRN Nausea      OBJECTIVE:    Sedation Score:	[ X] Alert	[ ] Drowsy 	[ ] Arousable	[ ] Asleep	[ ] Unresponsive    Side Effects:	[X ] None	[ ] Nausea	[ ] Vomiting	[ ] Pruritus  		[ ] Other:    Vital Signs Last 24 Hrs  T(C): 36.9 (15 Feb 2025 09:14), Max: 36.9 (15 Feb 2025 09:14)  T(F): 98.5 (15 Feb 2025 09:14), Max: 98.5 (15 Feb 2025 09:14)  HR: 55 (15 Feb 2025 09:14) (55 - 109)  BP: 129/62 (15 Feb 2025 09:14) (106/68 - 181/98)  BP(mean): 92 (14 Feb 2025 23:30) (91 - 114)  RR: 18 (15 Feb 2025 09:14) (16 - 18)  SpO2: 98% (15 Feb 2025 09:14) (94% - 100%)    Parameters below as of 15 Feb 2025 09:14  Patient On (Oxygen Delivery Method): room air        ASSESSMENT/ PLAN    Therapy to  be:               [X] Continued   [ ] Discontinued   [ ] Changed to PRN Analgesics    Documentation and Verification of current medications:   [X] Done	[ ] Not done, not eligible    Comments:

## 2025-02-15 NOTE — PROGRESS NOTE ADULT - SUBJECTIVE AND OBJECTIVE BOX
SURGERY DAILY PROGRESS NOTE     Patient is a 79y old  Male who presents with a chief complaint of closed loop SBO (14 Feb 2025 17:49)        OVERNIGHT EVENTS: NAEON, NGT position confirmed on CXR       SUBJECTIVE:   Patient seen and examined at bedside with surgical team ***      OBJECTIVE     Vital Signs Last 24 Hrs  T(C): 36.5 (15 Feb 2025 02:45), Max: 36.8 (14 Feb 2025 17:18)  T(F): 97.7 (15 Feb 2025 02:45), Max: 98.2 (14 Feb 2025 17:18)  HR: 81 (15 Feb 2025 02:45) (65 - 109)  BP: 119/70 (15 Feb 2025 02:45) (106/68 - 181/98)  BP(mean): 92 (14 Feb 2025 23:30) (91 - 114)  RR: 18 (15 Feb 2025 02:45) (16 - 18)  SpO2: 97% (15 Feb 2025 02:45) (94% - 100%)    Parameters below as of 15 Feb 2025 02:45  Patient On (Oxygen Delivery Method): room air    I&O's Detail    14 Feb 2025 07:01  -  15 Feb 2025 03:42  --------------------------------------------------------  IN:    Lactated Ringers: 150 mL  Total IN: 150 mL    OUT:    Colostomy (mL): 0 mL    Indwelling Catheter - Urethral (mL): 60 mL    Nasogastric/Oral tube (mL): 0 mL  Total OUT: 60 mL    Total NET: 90 mL          Physical Exam  Constitutional: NAD  HEENT: NGT in place  Respiratory: Breathing comfortably on RA  Gastrointestinal: Soft, nontender, nondistended. Midline laparotomy incision with overlying Aquacel with mild strikethrough. Ostomy pink, no gas or stool in bag. ***  : Sánchez in place draining clear, yellow urine   Extremities: Moving all extremities, no edema, warm and well-perfused  Psychiatric:  A&Ox3, appropriate affect          Medications  MEDICATIONS  (STANDING):  acetaminophen   IVPB .. 1000 milliGRAM(s) IV Intermittent every 6 hours  cefoTEtan  IVPB      cefoTEtan  IVPB 2 Gram(s) IV Intermittent every 12 hours  enoxaparin Injectable 40 milliGRAM(s) SubCutaneous every 24 hours  HYDROmorphone PCA (1 mG/mL) 30 milliLiter(s) PCA Continuous PCA Continuous  influenza  Vaccine (HIGH DOSE) 0.5 milliLiter(s) IntraMuscular once  lactated ringers. 1000 milliLiter(s) (100 mL/Hr) IV Continuous <Continuous>  metoprolol tartrate Injectable 5 milliGRAM(s) IV Push every 6 hours  pantoprazole  Injectable 40 milliGRAM(s) IV Push every 24 hours    MEDICATIONS  (PRN):  HYDROmorphone PCA (1 mG/mL) Rescue Clinician Bolus 0.5 milliGRAM(s) IV Push every 15 minutes PRN for Pain Scale GREATER THAN 6  nalbuphine Injectable 2.5 milliGRAM(s) IV Push every 6 hours PRN Pruritus  naloxone Injectable 0.1 milliGRAM(s) IV Push every 3 minutes PRN For ANY of the following changes in patient status:  A. RR LESS THAN 10 breaths per minute, B. Oxygen saturation LESS THAN 90%, C. Sedation score of 6  ondansetron Injectable 4 milliGRAM(s) IV Push every 6 hours PRN Nausea        LABS:                        15.8   6.12  )-----------( 299      ( 14 Feb 2025 16:03 )             46.6           PT/INR - ( 14 Feb 2025 16:03 )   PT: 11.8 sec;   INR: 1.04 ratio         PTT - ( 14 Feb 2025 16:03 )  PTT:34.2 sec      ABO Interpretation: O (02-14-25 @ 16:05)   SURGERY DAILY PROGRESS NOTE     Patient is a 79y old  Male who presents with a chief complaint of closed loop SBO (14 Feb 2025 17:49)        OVERNIGHT EVENTS: NAEON, NGT position confirmed on CXR       SUBJECTIVE:   Patient seen and examined at bedside with surgical team. Pain mildly controlled. Not passing gas. NGT in place.       OBJECTIVE     Vital Signs Last 24 Hrs  T(C): 36.5 (15 Feb 2025 02:45), Max: 36.8 (14 Feb 2025 17:18)  T(F): 97.7 (15 Feb 2025 02:45), Max: 98.2 (14 Feb 2025 17:18)  HR: 81 (15 Feb 2025 02:45) (65 - 109)  BP: 119/70 (15 Feb 2025 02:45) (106/68 - 181/98)  BP(mean): 92 (14 Feb 2025 23:30) (91 - 114)  RR: 18 (15 Feb 2025 02:45) (16 - 18)  SpO2: 97% (15 Feb 2025 02:45) (94% - 100%)    Parameters below as of 15 Feb 2025 02:45  Patient On (Oxygen Delivery Method): room air    I&O's Detail    14 Feb 2025 07:01  -  15 Feb 2025 03:42  --------------------------------------------------------  IN:    Lactated Ringers: 150 mL  Total IN: 150 mL    OUT:    Colostomy (mL): 0 mL    Indwelling Catheter - Urethral (mL): 60 mL    Nasogastric/Oral tube (mL): 0 mL  Total OUT: 60 mL    Total NET: 90 mL          Physical Exam  Constitutional: NAD  HEENT: NGT in place  Respiratory: Breathing comfortably on RA  Gastrointestinal: Soft, nontender, nondistended. Midline laparotomy incision with overlying Aquacel with mild strikethrough. Ostomy pink, no stool in bag.   : Sánchez in place draining clear, yellow urine   Extremities: Moving all extremities, no edema, warm and well-perfused  Psychiatric:  A&Ox3, appropriate affect          Medications  MEDICATIONS  (STANDING):  acetaminophen   IVPB .. 1000 milliGRAM(s) IV Intermittent every 6 hours  cefoTEtan  IVPB      cefoTEtan  IVPB 2 Gram(s) IV Intermittent every 12 hours  enoxaparin Injectable 40 milliGRAM(s) SubCutaneous every 24 hours  HYDROmorphone PCA (1 mG/mL) 30 milliLiter(s) PCA Continuous PCA Continuous  influenza  Vaccine (HIGH DOSE) 0.5 milliLiter(s) IntraMuscular once  lactated ringers. 1000 milliLiter(s) (100 mL/Hr) IV Continuous <Continuous>  metoprolol tartrate Injectable 5 milliGRAM(s) IV Push every 6 hours  pantoprazole  Injectable 40 milliGRAM(s) IV Push every 24 hours    MEDICATIONS  (PRN):  HYDROmorphone PCA (1 mG/mL) Rescue Clinician Bolus 0.5 milliGRAM(s) IV Push every 15 minutes PRN for Pain Scale GREATER THAN 6  nalbuphine Injectable 2.5 milliGRAM(s) IV Push every 6 hours PRN Pruritus  naloxone Injectable 0.1 milliGRAM(s) IV Push every 3 minutes PRN For ANY of the following changes in patient status:  A. RR LESS THAN 10 breaths per minute, B. Oxygen saturation LESS THAN 90%, C. Sedation score of 6  ondansetron Injectable 4 milliGRAM(s) IV Push every 6 hours PRN Nausea        LABS:                        15.8   6.12  )-----------( 299      ( 14 Feb 2025 16:03 )             46.6           PT/INR - ( 14 Feb 2025 16:03 )   PT: 11.8 sec;   INR: 1.04 ratio         PTT - ( 14 Feb 2025 16:03 )  PTT:34.2 sec      ABO Interpretation: O (02-14-25 @ 16:05)

## 2025-02-15 NOTE — CHART NOTE - NSCHARTNOTEFT_GEN_A_CORE
POST-OPERATIVE NOTE    Subjective:  Patient is s/p ex-lap, JOSELUIS. Pt states he feels okay, just has discomfort from the NGT. Reports that the PCA is controlling his pain. No n/v. No chest pain or shortness of breath.       Vital Signs Last 24 Hrs  T(C): 36.7 (14 Feb 2025 23:00), Max: 36.8 (14 Feb 2025 17:18)  T(F): 98.1 (14 Feb 2025 23:00), Max: 98.2 (14 Feb 2025 17:18)  HR: 84 (14 Feb 2025 23:30) (67 - 109)  BP: 129/66 (14 Feb 2025 23:30) (126/68 - 181/98)  BP(mean): 92 (14 Feb 2025 23:30) (91 - 114)  RR: 17 (14 Feb 2025 23:30) (16 - 18)  SpO2: 98% (14 Feb 2025 23:30) (97% - 100%)    Parameters below as of 14 Feb 2025 23:30  Patient On (Oxygen Delivery Method): room air      I&O's Detail    14 Feb 2025 07:01  -  15 Feb 2025 01:38  --------------------------------------------------------  IN:    Lactated Ringers: 150 mL  Total IN: 150 mL    OUT:    Colostomy (mL): 0 mL    Indwelling Catheter - Urethral (mL): 60 mL    Nasogastric/Oral tube (mL): 0 mL  Total OUT: 60 mL    Total NET: 90 mL        MEDICATIONS  cefoTEtan  IVPB   cefoTEtan  IVPB 2  cefoTEtan  IVPB   cefoTEtan  IVPB 2  enoxaparin Injectable 40  metoprolol tartrate Injectable 5    PAST MEDICAL & SURGICAL HISTORY:  Rectal Neoplasm  treated with oral chemothearpy and radiation 5/10- 6/10        Hemorrhoid      Malignant neoplasm of oropharynx      Colostomy status      Neck mass      S/P Colonoscopy  3/10      Rectal cancer      S/P colostomy      History of cancer chemotherapy      S/P radiation therapy          Physical Exam:  General: NAD, resting comfortably in bed  HEENT: NGT in place   Pulmonary: Nonlabored breathing, no respiratory distress  Abdominal: Soft, nontender, nondistended. Midline laparotomy incision with overlying Aquacel with mild strikethrough. Ostomy pink, no gas or stool in bag.  : Sánchez in place draining clear, yellow urine     LABS:                        15.8   6.12  )-----------( 299      ( 14 Feb 2025 16:03 )             46.6           PT/INR - ( 14 Feb 2025 16:03 )   PT: 11.8 sec;   INR: 1.04 ratio         PTT - ( 14 Feb 2025 16:03 )  PTT:34.2 sec  CAPILLARY BLOOD GLUCOSE          Radiology and Additional Studies:    Assessment:  The patient is a 79y Male who is now several hours post-op from a ex-lap and JOSELUIS for closed loop SBO. Patient recovering appropriately.    Plan:  - LR @ 100  - Pain control: IV Tylenol, dilaudid PCA   - Diet: NPO/NGT   - DVT ppx: LVX  - OOB and ambulating as tolerated  - F/u AM labs    ACS/Trauma Surgery  [id number], [phone]

## 2025-02-15 NOTE — PROGRESS NOTE ADULT - ASSESSMENT
9 year old man with PMHx rectal cancer s/p neoadjuvant chemo and APR (Dr. Ruiz 2010), throat cancer s/p chemo/radiation, and perforated gastric ulcer s/p Kash patch with falciform (Dr. Akhtar, 2024), presenting with closed loop bowel obstruction. He is now s/p ex-lap and open JOSELUIS on 2/14/25.       Plan:  - LR @ 100  - C/w NGT until bowel function   - Pain control: IV Tylenol, dilaudid PCA   - Diet: NPO/NGT   - DVT ppx: LVX  - OOB and ambulating as tolerated    ACS/Trauma Surgery  [id number], [phone]   9 year old man with PMHx rectal cancer s/p neoadjuvant chemo and APR (Dr. Ruiz 2010), throat cancer s/p chemo/radiation, and perforated gastric ulcer s/p Kash patch with falciform (Dr. Akhtar, 2024), presenting with closed loop bowel obstruction. He is now s/p ex-lap and open JOSELUIS on 2/14/25.       Plan:  - LR @ 100  - C/w NGT until bowel function   - Pain control: IV Tylenol, Dilaudid PCA   - Diet: NPO/NGT   - DVT ppx: LVX  - OOB and ambulating as tolerated  - Remove MAXX Sánchez TOV    ACS/Trauma Surgery  [id number], [phone]

## 2025-02-16 LAB
ANION GAP SERPL CALC-SCNC: 13 MMOL/L — SIGNIFICANT CHANGE UP (ref 5–17)
BUN SERPL-MCNC: 27 MG/DL — HIGH (ref 7–23)
CALCIUM SERPL-MCNC: 9.2 MG/DL — SIGNIFICANT CHANGE UP (ref 8.4–10.5)
CHLORIDE SERPL-SCNC: 103 MMOL/L — SIGNIFICANT CHANGE UP (ref 96–108)
CO2 SERPL-SCNC: 23 MMOL/L — SIGNIFICANT CHANGE UP (ref 22–31)
CREAT SERPL-MCNC: 1.14 MG/DL — SIGNIFICANT CHANGE UP (ref 0.5–1.3)
EGFR: 66 ML/MIN/1.73M2 — SIGNIFICANT CHANGE UP
GLUCOSE SERPL-MCNC: 85 MG/DL — SIGNIFICANT CHANGE UP (ref 70–99)
HCT VFR BLD CALC: 37.1 % — LOW (ref 39–50)
HGB BLD-MCNC: 11.8 G/DL — LOW (ref 13–17)
MAGNESIUM SERPL-MCNC: 2.1 MG/DL — SIGNIFICANT CHANGE UP (ref 1.6–2.6)
MCHC RBC-ENTMCNC: 29.8 PG — SIGNIFICANT CHANGE UP (ref 27–34)
MCHC RBC-ENTMCNC: 31.8 G/DL — LOW (ref 32–36)
MCV RBC AUTO: 93.7 FL — SIGNIFICANT CHANGE UP (ref 80–100)
NRBC BLD AUTO-RTO: 0 /100 WBCS — SIGNIFICANT CHANGE UP (ref 0–0)
PHOSPHATE SERPL-MCNC: 2.6 MG/DL — SIGNIFICANT CHANGE UP (ref 2.5–4.5)
PLATELET # BLD AUTO: 203 K/UL — SIGNIFICANT CHANGE UP (ref 150–400)
POTASSIUM SERPL-MCNC: 4.6 MMOL/L — SIGNIFICANT CHANGE UP (ref 3.5–5.3)
POTASSIUM SERPL-SCNC: 4.6 MMOL/L — SIGNIFICANT CHANGE UP (ref 3.5–5.3)
RBC # BLD: 3.96 M/UL — LOW (ref 4.2–5.8)
RBC # FLD: 14.7 % — HIGH (ref 10.3–14.5)
SODIUM SERPL-SCNC: 139 MMOL/L — SIGNIFICANT CHANGE UP (ref 135–145)
WBC # BLD: 4.76 K/UL — SIGNIFICANT CHANGE UP (ref 3.8–10.5)
WBC # FLD AUTO: 4.76 K/UL — SIGNIFICANT CHANGE UP (ref 3.8–10.5)

## 2025-02-16 RX ORDER — SODIUM PHOSPHATE,DIBASIC DIHYD
30 POWDER (GRAM) MISCELLANEOUS ONCE
Refills: 0 | Status: COMPLETED | OUTPATIENT
Start: 2025-02-16 | End: 2025-02-16

## 2025-02-16 RX ADMIN — Medication 400 MILLIGRAM(S): at 15:06

## 2025-02-16 RX ADMIN — METOPROLOL SUCCINATE 5 MILLIGRAM(S): 50 TABLET, EXTENDED RELEASE ORAL at 18:11

## 2025-02-16 RX ADMIN — Medication 1000 MILLIGRAM(S): at 15:30

## 2025-02-16 RX ADMIN — METOPROLOL SUCCINATE 5 MILLIGRAM(S): 50 TABLET, EXTENDED RELEASE ORAL at 23:14

## 2025-02-16 RX ADMIN — METOPROLOL SUCCINATE 5 MILLIGRAM(S): 50 TABLET, EXTENDED RELEASE ORAL at 05:49

## 2025-02-16 RX ADMIN — Medication 40 MILLIGRAM(S): at 05:49

## 2025-02-16 RX ADMIN — ENOXAPARIN SODIUM 40 MILLIGRAM(S): 100 INJECTION SUBCUTANEOUS at 05:49

## 2025-02-16 RX ADMIN — Medication 400 MILLIGRAM(S): at 05:49

## 2025-02-16 RX ADMIN — Medication 400 MILLIGRAM(S): at 21:25

## 2025-02-16 RX ADMIN — METOPROLOL SUCCINATE 5 MILLIGRAM(S): 50 TABLET, EXTENDED RELEASE ORAL at 13:02

## 2025-02-16 RX ADMIN — Medication 85 MILLIMOLE(S): at 15:05

## 2025-02-16 RX ADMIN — Medication 30 MILLILITER(S): at 07:19

## 2025-02-16 RX ADMIN — Medication 30 MILLILITER(S): at 19:21

## 2025-02-16 NOTE — PROGRESS NOTE ADULT - ASSESSMENT
SBO  s/p repair  abx  plan as per surgery     HTN  cont IV BB for now  resume PO once able to take     DVT proph  on lovenox     Advanced care planning was discussed with patient and family.  Risks, benefits and alternatives of the cardiac treatments and medical therapy including procedures were discussed in detail and all questions were answered. Importance of compliance with medical therapy and lifestyle modification to improve cardiovascular health were addressed. Appropriate forms and patient educational materials were reviewed. 30 minutes face to face spent.

## 2025-02-16 NOTE — PROGRESS NOTE ADULT - SUBJECTIVE AND OBJECTIVE BOX
Subjective: Patient seen and examined. No new events except as noted.     SUBJECTIVE/ROS:  nad      MEDICATIONS:  MEDICATIONS  (STANDING):  acetaminophen   IVPB .. 1000 milliGRAM(s) IV Intermittent every 6 hours  benzocaine 20% Spray 1 Spray(s) Topical once  enoxaparin Injectable 40 milliGRAM(s) SubCutaneous every 24 hours  HYDROmorphone PCA (1 mG/mL) 30 milliLiter(s) PCA Continuous PCA Continuous  influenza  Vaccine (HIGH DOSE) 0.5 milliLiter(s) IntraMuscular once  lactated ringers. 1000 milliLiter(s) (100 mL/Hr) IV Continuous <Continuous>  metoprolol tartrate Injectable 5 milliGRAM(s) IV Push every 6 hours  pantoprazole  Injectable 40 milliGRAM(s) IV Push every 24 hours      PHYSICAL EXAM:  T(C): 36.3 (02-16-25 @ 04:48), Max: 36.9 (02-15-25 @ 09:14)  HR: 64 (02-16-25 @ 04:48) (50 - 64)  BP: 151/72 (02-16-25 @ 04:48) (108/65 - 151/72)  RR: 18 (02-16-25 @ 04:48) (18 - 18)  SpO2: 97% (02-16-25 @ 04:48) (97% - 98%)  Wt(kg): --  I&O's Summary    15 Feb 2025 07:01  -  16 Feb 2025 07:00  --------------------------------------------------------  IN: 2600 mL / OUT: 775 mL / NET: 1825 mL            JVP: Normal  Neck: supple  Lung: clear   CV: S1 S2 , Murmur:  Abd: soft  Ext: No edema  neuro: Awake / alert  Psych: flat affect  Skin: normal``    LABS/DATA:    CARDIAC MARKERS:                                13.2   5.79  )-----------( 215      ( 15 Feb 2025 07:00 )             39.3     02-15    138  |  103  |  30[H]  ----------------------------<  129[H]  4.4   |  23  |  1.14    Ca    9.1      15 Feb 2025 06:58  Phos  4.5     02-15  Mg     1.9     02-15      proBNP:   Lipid Profile:   HgA1c:   TSH:     TELE:  EKG:

## 2025-02-16 NOTE — PROGRESS NOTE ADULT - SUBJECTIVE AND OBJECTIVE BOX
Day 2 of Anesthesia Pain Management Service    SUBJECTIVE: Patient is doing well with IV PCA    Pain Scale Score:	[X] Refer to charted pain scores    THERAPY:    [ ] IV PCA Morphine		[ ] 5 mg/mL	[ ] 1 mg/mL  [X] IV PCA Hydromorphone	[ ] 5 mg/mL	[X] 1 mg/mL  [ ] IV PCA Fentanyl		[ ] 50 micrograms/mL    Demand dose: 0.2 mg     Lockout: 6 minutes   Continuous Rate: 0 mg/hr  4 Hour Limit: 4 mg    MEDICATIONS  (STANDING):  acetaminophen   IVPB .. 1000 milliGRAM(s) IV Intermittent every 6 hours  benzocaine 20% Spray 1 Spray(s) Topical once  enoxaparin Injectable 40 milliGRAM(s) SubCutaneous every 24 hours  HYDROmorphone PCA (1 mG/mL) 30 milliLiter(s) PCA Continuous PCA Continuous  influenza  Vaccine (HIGH DOSE) 0.5 milliLiter(s) IntraMuscular once  lactated ringers. 1000 milliLiter(s) (100 mL/Hr) IV Continuous <Continuous>  metoprolol tartrate Injectable 5 milliGRAM(s) IV Push every 6 hours  pantoprazole  Injectable 40 milliGRAM(s) IV Push every 24 hours  sodium phosphate 30 milliMole(s)/500 mL IVPB 30 milliMole(s) IV Intermittent once    MEDICATIONS  (PRN):  HYDROmorphone PCA (1 mG/mL) Rescue Clinician Bolus 0.5 milliGRAM(s) IV Push every 15 minutes PRN for Pain Scale GREATER THAN 6  nalbuphine Injectable 2.5 milliGRAM(s) IV Push every 6 hours PRN Pruritus  naloxone Injectable 0.1 milliGRAM(s) IV Push every 3 minutes PRN For ANY of the following changes in patient status:  A. RR LESS THAN 10 breaths per minute, B. Oxygen saturation LESS THAN 90%, C. Sedation score of 6  ondansetron Injectable 4 milliGRAM(s) IV Push every 6 hours PRN Nausea      OBJECTIVE:    Sedation Score:	[ X] Alert	[ ] Drowsy 	[ ] Arousable	[ ] Asleep	[ ] Unresponsive    Side Effects:	[X ] None	[ ] Nausea	[ ] Vomiting	[ ] Pruritus  		[ ] Other:    Vital Signs Last 24 Hrs  T(C): 36.9 (16 Feb 2025 09:19), Max: 36.9 (16 Feb 2025 09:19)  T(F): 98.4 (16 Feb 2025 09:19), Max: 98.4 (16 Feb 2025 09:19)  HR: 60 (16 Feb 2025 09:19) (50 - 64)  BP: 133/68 (16 Feb 2025 09:19) (108/65 - 151/72)  BP(mean): --  RR: 18 (16 Feb 2025 09:19) (18 - 18)  SpO2: 95% (16 Feb 2025 09:19) (95% - 97%)    Parameters below as of 16 Feb 2025 09:19  Patient On (Oxygen Delivery Method): room air        ASSESSMENT/ PLAN    Therapy to  be:               [X] Continued   [ ] Discontinued   [ ] Changed to PRN Analgesics    Documentation and Verification of current medications:   [X] Done	[ ] Not done, not eligible    Comments:

## 2025-02-16 NOTE — PROGRESS NOTE ADULT - ASSESSMENT
79 year old man with PMHx rectal cancer s/p neoadjuvant chemo and APR (Dr. Ruiz 2010), throat cancer s/p chemo/radiation, and perforated gastric ulcer s/p Kash patch with falciform (Dr. Akhtar, 2024), presenting with closed loop bowel obstruction. He is now s/p ex-lap and open JOSELUIS on 2/14/25.     Plan:  - DVT ppx: LVX  - LR @ 100  - C/w NGT until bowel function   - Pain control: IV Tylenol, Dilaudid PCA   - Diet: NPO, NGT  - Clamp Trial  - OOB and ambulating as tolerated  - Passed TOV  - PT Consult    ACS/Trauma Surgery  [id number]

## 2025-02-16 NOTE — PROGRESS NOTE ADULT - SUBJECTIVE AND OBJECTIVE BOX
TEAM [ACS/Trauma] Surgery Daily Progress Note  =====================================================    Overnight: NAEO    SUBJECTIVE: Patient seen and examined at bedside on AM rounds. The patient is resting comfortably, pain is well controlled. NGT. NPO. Voiding. Colostomy w/o air or stool. Denies fever, chills, nausea, vomiting, diarrhea, chest pain, and SOB.     PMH:   PAST MEDICAL & SURGICAL HISTORY:  Rectal Neoplasm  treated with oral chemothearpy and radiation 5/10- 6/10        Hemorrhoid      Malignant neoplasm of oropharynx      Colostomy status      Neck mass      S/P Colonoscopy  3/10      Rectal cancer      S/P colostomy      History of cancer chemotherapy      S/P radiation therapy          ALLERGIES:  No Known Allergies      --------------------------------------------------------------------------------------    MEDICATIONS:    Neurologic Medications  acetaminophen   IVPB .. 1000 milliGRAM(s) IV Intermittent every 6 hours  HYDROmorphone PCA (1 mG/mL) 30 milliLiter(s) PCA Continuous PCA Continuous  HYDROmorphone PCA (1 mG/mL) Rescue Clinician Bolus 0.5 milliGRAM(s) IV Push every 15 minutes PRN for Pain Scale GREATER THAN 6  nalbuphine Injectable 2.5 milliGRAM(s) IV Push every 6 hours PRN Pruritus  ondansetron Injectable 4 milliGRAM(s) IV Push every 6 hours PRN Nausea    Respiratory Medications    Cardiovascular Medications  metoprolol tartrate Injectable 5 milliGRAM(s) IV Push every 6 hours    Gastrointestinal Medications  lactated ringers. 1000 milliLiter(s) IV Continuous <Continuous>  pantoprazole  Injectable 40 milliGRAM(s) IV Push every 24 hours  sodium phosphate 30 milliMole(s)/500 mL IVPB 30 milliMole(s) IV Intermittent once    Genitourinary Medications    Hematologic/Oncologic Medications  enoxaparin Injectable 40 milliGRAM(s) SubCutaneous every 24 hours  influenza  Vaccine (HIGH DOSE) 0.5 milliLiter(s) IntraMuscular once    Antimicrobial/Immunologic Medications    Endocrine/Metabolic Medications    Topical/Other Medications  benzocaine 20% Spray 1 Spray(s) Topical once  naloxone Injectable 0.1 milliGRAM(s) IV Push every 3 minutes PRN For ANY of the following changes in patient status:  A. RR LESS THAN 10 breaths per minute, B. Oxygen saturation LESS THAN 90%, C. Sedation score of 6    --------------------------------------------------------------------------------------    VITAL SIGNS:    T(C): 36.9 (02-16-25 @ 12:59), Max: 36.9 (02-16-25 @ 09:19)  HR: 53 (02-16-25 @ 12:59) (50 - 64)  BP: 129/70 (02-16-25 @ 12:59) (108/65 - 151/72)  RR: 18 (02-16-25 @ 12:59) (18 - 18)  SpO2: 94% (02-16-25 @ 12:59) (94% - 97%)    --------------------------------------------------------------------------------------    EXAM    General: NAD, resting in bed comfortably.  Cardiac: regular rate, warm and well perfused  Respiratory: Nonlabored respirations, normal cw expansion.  Abdomen: Soft, nontender, nondistended. Midline laparotomy incision with overlying Aquacel with mild strikethrough. Ostomy pink, no stool in bag.   Extremities: normal strength, FROM, no deformities    --------------------------------------------------------------------------------------    LABS        --------------------------------------      --->>> <<<---------------------------------------------    INS AND OUTS:    02-15-25 @ 07:01  -  02-16-25 @ 07:00  --------------------------------------------------------  IN: 2600 mL / OUT: 775 mL / NET: 1825 mL    02-16-25 @ 07:01  -  02-16-25 @ 13:43  --------------------------------------------------------  IN: 1000 mL / OUT: 600 mL / NET: 400 mL        --------------------------------------------------------------------------------------

## 2025-02-17 LAB
ANION GAP SERPL CALC-SCNC: 15 MMOL/L — SIGNIFICANT CHANGE UP (ref 5–17)
BUN SERPL-MCNC: 21 MG/DL — SIGNIFICANT CHANGE UP (ref 7–23)
CALCIUM SERPL-MCNC: 9.2 MG/DL — SIGNIFICANT CHANGE UP (ref 8.4–10.5)
CHLORIDE SERPL-SCNC: 102 MMOL/L — SIGNIFICANT CHANGE UP (ref 96–108)
CO2 SERPL-SCNC: 22 MMOL/L — SIGNIFICANT CHANGE UP (ref 22–31)
CREAT SERPL-MCNC: 0.99 MG/DL — SIGNIFICANT CHANGE UP (ref 0.5–1.3)
EGFR: 78 ML/MIN/1.73M2 — SIGNIFICANT CHANGE UP
GLUCOSE BLDC GLUCOMTR-MCNC: 57 MG/DL — LOW (ref 70–99)
GLUCOSE BLDC GLUCOMTR-MCNC: 57 MG/DL — LOW (ref 70–99)
GLUCOSE BLDC GLUCOMTR-MCNC: 78 MG/DL — SIGNIFICANT CHANGE UP (ref 70–99)
GLUCOSE BLDC GLUCOMTR-MCNC: 80 MG/DL — SIGNIFICANT CHANGE UP (ref 70–99)
GLUCOSE SERPL-MCNC: 48 MG/DL — CRITICAL LOW (ref 70–99)
HCT VFR BLD CALC: 36.1 % — LOW (ref 39–50)
HGB BLD-MCNC: 12 G/DL — LOW (ref 13–17)
MAGNESIUM SERPL-MCNC: 2 MG/DL — SIGNIFICANT CHANGE UP (ref 1.6–2.6)
MCHC RBC-ENTMCNC: 31 PG — SIGNIFICANT CHANGE UP (ref 27–34)
MCHC RBC-ENTMCNC: 33.2 G/DL — SIGNIFICANT CHANGE UP (ref 32–36)
MCV RBC AUTO: 93.3 FL — SIGNIFICANT CHANGE UP (ref 80–100)
NRBC BLD AUTO-RTO: 0 /100 WBCS — SIGNIFICANT CHANGE UP (ref 0–0)
PHOSPHATE SERPL-MCNC: 1.8 MG/DL — LOW (ref 2.5–4.5)
PLATELET # BLD AUTO: 222 K/UL — SIGNIFICANT CHANGE UP (ref 150–400)
POTASSIUM SERPL-MCNC: 4.6 MMOL/L — SIGNIFICANT CHANGE UP (ref 3.5–5.3)
POTASSIUM SERPL-SCNC: 4.6 MMOL/L — SIGNIFICANT CHANGE UP (ref 3.5–5.3)
RBC # BLD: 3.87 M/UL — LOW (ref 4.2–5.8)
RBC # FLD: 14.6 % — HIGH (ref 10.3–14.5)
SODIUM SERPL-SCNC: 139 MMOL/L — SIGNIFICANT CHANGE UP (ref 135–145)
WBC # BLD: 5.37 K/UL — SIGNIFICANT CHANGE UP (ref 3.8–10.5)
WBC # FLD AUTO: 5.37 K/UL — SIGNIFICANT CHANGE UP (ref 3.8–10.5)

## 2025-02-17 PROCEDURE — 99233 SBSQ HOSP IP/OBS HIGH 50: CPT

## 2025-02-17 RX ORDER — SODIUM PHOSPHATE,DIBASIC DIHYD
15 POWDER (GRAM) MISCELLANEOUS ONCE
Refills: 0 | Status: COMPLETED | OUTPATIENT
Start: 2025-02-17 | End: 2025-02-17

## 2025-02-17 RX ORDER — DEXTROSE 50 % IN WATER 50 %
12.5 SYRINGE (ML) INTRAVENOUS ONCE
Refills: 0 | Status: COMPLETED | OUTPATIENT
Start: 2025-02-17 | End: 2025-02-17

## 2025-02-17 RX ORDER — SODIUM PHOSPHATE,DIBASIC DIHYD
30 POWDER (GRAM) MISCELLANEOUS ONCE
Refills: 0 | Status: DISCONTINUED | OUTPATIENT
Start: 2025-02-17 | End: 2025-02-17

## 2025-02-17 RX ORDER — ACETAMINOPHEN 500 MG/5ML
1000 LIQUID (ML) ORAL EVERY 6 HOURS
Refills: 0 | Status: COMPLETED | OUTPATIENT
Start: 2025-02-17 | End: 2025-02-18

## 2025-02-17 RX ORDER — DEXTROSE 50 % IN WATER 50 %
12.5 SYRINGE (ML) INTRAVENOUS ONCE
Refills: 0 | Status: DISCONTINUED | OUTPATIENT
Start: 2025-02-17 | End: 2025-02-23

## 2025-02-17 RX ADMIN — SODIUM CHLORIDE 100 MILLILITER(S): 9 INJECTION, SOLUTION INTRAVENOUS at 21:11

## 2025-02-17 RX ADMIN — Medication 1000 MILLIGRAM(S): at 18:06

## 2025-02-17 RX ADMIN — Medication 12.5 GRAM(S): at 10:46

## 2025-02-17 RX ADMIN — Medication 400 MILLIGRAM(S): at 17:36

## 2025-02-17 RX ADMIN — Medication 1000 MILLIGRAM(S): at 06:02

## 2025-02-17 RX ADMIN — Medication 30 MILLILITER(S): at 19:04

## 2025-02-17 RX ADMIN — Medication 400 MILLIGRAM(S): at 12:41

## 2025-02-17 RX ADMIN — Medication 1000 MILLIGRAM(S): at 06:03

## 2025-02-17 RX ADMIN — METOPROLOL SUCCINATE 5 MILLIGRAM(S): 50 TABLET, EXTENDED RELEASE ORAL at 12:41

## 2025-02-17 RX ADMIN — Medication 30 MILLILITER(S): at 07:19

## 2025-02-17 RX ADMIN — Medication 1000 MILLIGRAM(S): at 00:29

## 2025-02-17 RX ADMIN — Medication 40 MILLIGRAM(S): at 05:54

## 2025-02-17 RX ADMIN — Medication 400 MILLIGRAM(S): at 05:54

## 2025-02-17 RX ADMIN — Medication 63.75 MILLIMOLE(S): at 21:11

## 2025-02-17 RX ADMIN — METOPROLOL SUCCINATE 5 MILLIGRAM(S): 50 TABLET, EXTENDED RELEASE ORAL at 05:54

## 2025-02-17 RX ADMIN — ENOXAPARIN SODIUM 40 MILLIGRAM(S): 100 INJECTION SUBCUTANEOUS at 05:54

## 2025-02-17 RX ADMIN — Medication 1000 MILLIGRAM(S): at 13:11

## 2025-02-17 RX ADMIN — Medication 30 MILLILITER(S): at 04:30

## 2025-02-17 NOTE — PHYSICAL THERAPY INITIAL EVALUATION ADULT - PERTINENT HX OF CURRENT PROBLEM, REHAB EVAL
Mr. Fulton is a 79 year old man with PMHx rectal cancer s/p neoadjuvant chemo and APR (Dr. Ruiz 2010), throat cancer s/p chemo/radiation, and perforated gastric ulcer s/p Kash patch with falciform (Dr. Akhtar, 2024), presenting with 2 days of abdominal discomfort. Reports onset of symptoms two days ago, with decreased ostomy output (last gas/stool also two days ago). He denies nausea or vomiting, has been taking less PO with fear of provoking discomfort. Saw his GI outpatient who recommended a CT scan that was performed today. The scan resulted at 1pm with read of closed loop SBO and was told to come to the ED. Here he is afebrile and hemodynamically normal. Labs notable for lactate 3. 79 year old man with PMHx rectal cancer s/p neoadjuvant chemo and APR (Dr. Ruiz 2010), throat cancer s/p chemo/radiation, and perforated gastric ulcer s/p Kash patch with falciform (Dr. Akhtar, 2024), presenting with closed loop bowel obstruction. He is now s/p ex-lap and open JOSELUIS on 2/14/25.   CXR: No focal consolidations. Pneumoperitoneum, which may be seen in the postoperativesetting.  Enteric tube with tip coiled within the stomach and terminating near the GE junction. Recommend adjustment.

## 2025-02-17 NOTE — PROGRESS NOTE ADULT - SUBJECTIVE AND OBJECTIVE BOX
Subjective: Patient seen and examined. No new events except as noted.     SUBJECTIVE/ROS:  No chest pain, dyspnea, palpitation, or dizziness.       MEDICATIONS:  MEDICATIONS  (STANDING):  acetaminophen   IVPB .. 1000 milliGRAM(s) IV Intermittent every 6 hours  benzocaine 20% Spray 1 Spray(s) Topical once  enoxaparin Injectable 40 milliGRAM(s) SubCutaneous every 24 hours  HYDROmorphone PCA (1 mG/mL) 30 milliLiter(s) PCA Continuous PCA Continuous  influenza  Vaccine (HIGH DOSE) 0.5 milliLiter(s) IntraMuscular once  lactated ringers. 1000 milliLiter(s) (100 mL/Hr) IV Continuous <Continuous>  metoprolol tartrate Injectable 5 milliGRAM(s) IV Push every 6 hours  pantoprazole  Injectable 40 milliGRAM(s) IV Push every 24 hours      PHYSICAL EXAM:  T(C): 36.7 (02-17-25 @ 05:15), Max: 36.9 (02-16-25 @ 12:59)  HR: 68 (02-17-25 @ 05:15) (53 - 87)  BP: 138/69 (02-17-25 @ 05:15) (126/68 - 153/79)  RR: 18 (02-17-25 @ 05:15) (18 - 18)  SpO2: 95% (02-17-25 @ 05:15) (94% - 96%)  Wt(kg): --  I&O's Summary    16 Feb 2025 07:01  -  17 Feb 2025 07:00  --------------------------------------------------------  IN: 2700 mL / OUT: 1625 mL / NET: 1075 mL            JVP: Normal  Neck: supple  Lung: clear   CV: S1 S2 , Murmur:  Abd: soft  Ext: No edema  neuro: Awake / alert  Psych: flat affect  Skin: normal``    LABS/DATA:    CARDIAC MARKERS:                                12.0   5.37  )-----------( 222      ( 17 Feb 2025 07:28 )             36.1     02-17    139  |  102  |  21  ----------------------------<  48[LL]  4.6   |  22  |  0.99    Ca    9.2      17 Feb 2025 07:28  Phos  1.8     02-17  Mg     2.0     02-17      proBNP:   Lipid Profile:   HgA1c:   TSH:     TELE:  EKG:

## 2025-02-17 NOTE — PHYSICAL THERAPY INITIAL EVALUATION ADULT - ADDITIONAL COMMENTS
Pt states independent in ADL prior to hospitalization Pt lives with his wife in a PH with no steps to enter, 5 steps inside to kitchen. Pt states independent in ADL and ambulation prior to hospitalization without an AD.

## 2025-02-17 NOTE — ADVANCED PRACTICE NURSE CONSULT - REASON FOR CONSULT
Pouching issues; supplies  PMH is noted per review of chart:  Mr. Fulton is a 79 year old man with PMHx rectal cancer s/p neoadjuvant chemo and APR (Dr. Ruiz 2010), throat cancer s/p chemo/radiation, and perforated gastric ulcer s/p Kash patch with falciform (Dr. Akhtar, 2024), presenting with 2 days of abdominal discomfort. Reports onset of symptoms two days ago, with decreased ostomy output (last gas/stool also two days ago). He denies nausea or vomiting, has been taking less PO with fear of provoking discomfort. Saw his GI outpatient who recommended a CT scan that was performed today. The scan resulted at 1pm with read of closed loop SBO and was told to come to the ED.    Here he is afebrile and hemodynamically normal. Labs notable for lactate 3.  Patient is s/p ex-lap, JOSELUIS on 2/14/25

## 2025-02-17 NOTE — PHYSICAL THERAPY INITIAL EVALUATION ADULT - LEVEL OF INDEPENDENCE: SCOOT/BRIDGE, REHAB EVAL
Nutrition, metabolism, and development symptoms independent History of pulmonary embolism HTN (hypertension)

## 2025-02-17 NOTE — ADVANCED PRACTICE NURSE CONSULT - ASSESSMENT
Chart reviewed & events noted to date. Pt is well known to Ostomy Team from previous surgical admisisons/follow-up visits. Pt is s/p APR for rectal CA on 7/13/2010. Pt is independent with ostomy care & management. In to see pt this am, "feeling ok, want to go home & spend time with grandkids". Pt currently with NGT in place and noted pouching system intact; scant drainage & No air noted in pouch at this time. Colostomy pink & flush with skin. Pouch seal over midline Aquacel surgical dressing. Will coordinate with Trauma team regarding complete pouching system change Tues/Wed. Pt wears 1" precut convex Augustus skin barrier and closed end disposable pouch. Supplies provided; redemonstrated "drainable pouch" as pt uses "disposable" closed end pouch. Discussed with patient that when he begins to function & have +stool from stoma, pt can  "dispose" pouch. Discussed above with pt's spouse, who orders supplies via [employer]. Support & encouragement provided throughout visit. Will f/u. Staff to assist pt as needed.

## 2025-02-17 NOTE — PROGRESS NOTE ADULT - SUBJECTIVE AND OBJECTIVE BOX
Day 3 of Anesthesia Pain Management Service    SUBJECTIVE: Patient is doing well with IV PCA    Pain Scale Score:	[X] Refer to charted pain scores    THERAPY:    [ ] IV PCA Morphine		[ ] 5 mg/mL	[ ] 1 mg/mL  [X] IV PCA Hydromorphone	[ ] 5 mg/mL	[X] 1 mg/mL  [ ] IV PCA Fentanyl		[ ] 50 micrograms/mL    Demand dose: 0.2 mg     Lockout: 6 minutes   Continuous Rate: 0 mg/hr  4 Hour Limit: 4 mg    MEDICATIONS  (STANDING):  acetaminophen   IVPB .. 1000 milliGRAM(s) IV Intermittent every 6 hours  benzocaine 20% Spray 1 Spray(s) Topical once  dextrose 50% Injectable 12.5 Gram(s) IV Push once  enoxaparin Injectable 40 milliGRAM(s) SubCutaneous every 24 hours  HYDROmorphone PCA (1 mG/mL) 30 milliLiter(s) PCA Continuous PCA Continuous  influenza  Vaccine (HIGH DOSE) 0.5 milliLiter(s) IntraMuscular once  lactated ringers. 1000 milliLiter(s) (100 mL/Hr) IV Continuous <Continuous>  metoprolol tartrate Injectable 5 milliGRAM(s) IV Push every 6 hours  pantoprazole  Injectable 40 milliGRAM(s) IV Push every 24 hours    MEDICATIONS  (PRN):  HYDROmorphone PCA (1 mG/mL) Rescue Clinician Bolus 0.5 milliGRAM(s) IV Push every 15 minutes PRN for Pain Scale GREATER THAN 6  nalbuphine Injectable 2.5 milliGRAM(s) IV Push every 6 hours PRN Pruritus  naloxone Injectable 0.1 milliGRAM(s) IV Push every 3 minutes PRN For ANY of the following changes in patient status:  A. RR LESS THAN 10 breaths per minute, B. Oxygen saturation LESS THAN 90%, C. Sedation score of 6  ondansetron Injectable 4 milliGRAM(s) IV Push every 6 hours PRN Nausea      OBJECTIVE:    Sedation Score:	[ X] Alert	[ ] Drowsy 	[ ] Arousable	[ ] Asleep	[ ] Unresponsive    Side Effects:	[X ] None	[ ] Nausea	[ ] Vomiting	[ ] Pruritus  		[ ] Other:    Vital Signs Last 24 Hrs  T(C): 36.7 (17 Feb 2025 17:09), Max: 36.9 (16 Feb 2025 21:13)  T(F): 98.1 (17 Feb 2025 17:09), Max: 98.5 (16 Feb 2025 21:13)  HR: 54 (17 Feb 2025 17:09) (54 - 80)  BP: 121/69 (17 Feb 2025 17:09) (121/69 - 153/70)  BP(mean): --  RR: 18 (17 Feb 2025 17:09) (18 - 18)  SpO2: 96% (17 Feb 2025 17:09) (94% - 96%)    Parameters below as of 17 Feb 2025 17:09  Patient On (Oxygen Delivery Method): room air        ASSESSMENT/ PLAN    Therapy to  be:               [X] Continued   [ ] Discontinued   [ ] Changed to PRN Analgesics    Documentation and Verification of current medications:   [X] Done	[ ] Not done, not eligible

## 2025-02-17 NOTE — PROGRESS NOTE ADULT - SUBJECTIVE AND OBJECTIVE BOX
is a ACS/ Trauama Surgery Progress Note     SUBJECTIVE  The patient was seen and examined. No acute events overnight. Pain controlled, afebrile w/ stable vitals. NGT removed this morning. No osteomy output.     OBJECTIVE  ___________________________________________________  VITAL SIGNS / I&O's   Vital Signs Last 24 Hrs  T(C): 36.7 (17 Feb 2025 17:09), Max: 36.9 (16 Feb 2025 21:13)  T(F): 98.1 (17 Feb 2025 17:09), Max: 98.5 (16 Feb 2025 21:13)  HR: 54 (17 Feb 2025 17:09) (54 - 80)  BP: 121/69 (17 Feb 2025 17:09) (121/69 - 153/70)  BP(mean): --  RR: 18 (17 Feb 2025 17:09) (18 - 18)  SpO2: 96% (17 Feb 2025 17:09) (94% - 96%)    Parameters below as of 17 Feb 2025 17:09  Patient On (Oxygen Delivery Method): room air          16 Feb 2025 07:01  -  17 Feb 2025 07:00  --------------------------------------------------------  IN:    IV PiggyBack: 500 mL    IV PiggyBack: 100 mL    Lactated Ringers: 2100 mL  Total IN: 2700 mL    OUT:    Colostomy (mL): 0 mL    Nasogastric/Oral tube (mL): 75 mL    Oral Fluid: 0 mL    Voided (mL): 1550 mL  Total OUT: 1625 mL    Total NET: 1075 mL      17 Feb 2025 07:01  -  17 Feb 2025 20:07  --------------------------------------------------------  IN:    Lactated Ringers: 1200 mL  Total IN: 1200 mL    OUT:    Colostomy (mL): 0 mL    Voided (mL): 800 mL  Total OUT: 800 mL    Total NET: 400 mL        ___________________________________________________  PHYSICAL EXAM    General: NAD, resting in bed comfortably.  Cardiac: regular rate, warm and well perfused  Respiratory: Nonlabored respirations, normal cw expansion.  Abdomen: Soft, nontender, nondistended. Midline laparotomy incision with overlying Aquacel with mild strikethrough. Ostomy pink, no stool in bag.   Extremities: normal strength, FROM, no deformities    ___________________________________________________  LABS                        12.0   5.37  )-----------( 222      ( 17 Feb 2025 07:28 )             36.1     17 Feb 2025 07:28    139    |  102    |  21     ----------------------------<  48     4.6     |  22     |  0.99     Ca    9.2        17 Feb 2025 07:28  Phos  1.8       17 Feb 2025 07:28  Mg     2.0       17 Feb 2025 07:28        CAPILLARY BLOOD GLUCOSE      POCT Blood Glucose.: 80 mg/dL (17 Feb 2025 12:37)  POCT Blood Glucose.: 78 mg/dL (17 Feb 2025 11:49)  POCT Blood Glucose.: 57 mg/dL (17 Feb 2025 10:22)  POCT Blood Glucose.: 57 mg/dL (17 Feb 2025 09:18)        Urinalysis Basic - ( 17 Feb 2025 07:28 )    Color: x / Appearance: x / SG: x / pH: x  Gluc: 48 mg/dL / Ketone: x  / Bili: x / Urobili: x   Blood: x / Protein: x / Nitrite: x   Leuk Esterase: x / RBC: x / WBC x   Sq Epi: x / Non Sq Epi: x / Bacteria: x      ___________________________________________________  MICRO  Recent Cultures:    ___________________________________________________  MEDICATIONS  MEDICATIONS  (STANDING):  acetaminophen   IVPB .. 1000 milliGRAM(s) IV Intermittent every 6 hours  benzocaine 20% Spray 1 Spray(s) Topical once  dextrose 50% Injectable 12.5 Gram(s) IV Push once  enoxaparin Injectable 40 milliGRAM(s) SubCutaneous every 24 hours  HYDROmorphone PCA (1 mG/mL) 30 milliLiter(s) PCA Continuous PCA Continuous  influenza  Vaccine (HIGH DOSE) 0.5 milliLiter(s) IntraMuscular once  lactated ringers. 1000 milliLiter(s) (100 mL/Hr) IV Continuous <Continuous>  metoprolol tartrate Injectable 5 milliGRAM(s) IV Push every 6 hours  pantoprazole  Injectable 40 milliGRAM(s) IV Push every 24 hours  sodium phosphate 15 milliMole(s)/250 mL IVPB 15 milliMole(s) IV Intermittent once    MEDICATIONS  (PRN):  HYDROmorphone PCA (1 mG/mL) Rescue Clinician Bolus 0.5 milliGRAM(s) IV Push every 15 minutes PRN for Pain Scale GREATER THAN 6  nalbuphine Injectable 2.5 milliGRAM(s) IV Push every 6 hours PRN Pruritus  naloxone Injectable 0.1 milliGRAM(s) IV Push every 3 minutes PRN For ANY of the following changes in patient status:  A. RR LESS THAN 10 breaths per minute, B. Oxygen saturation LESS THAN 90%, C. Sedation score of 6  ondansetron Injectable 4 milliGRAM(s) IV Push every 6 hours PRN Nausea      ___________________________________________________    Assessment/Plan   LISA HARLEY is a 79 year old man with PMHx rectal cancer s/p neoadjuvant chemo and APR (Dr. Ruiz 2010), throat cancer s/p chemo/radiation, and perforated gastric ulcer s/p Kash patch with falciform (Dr. Akhtar, 2024), presenting with closed loop bowel obstruction. He is now s/p ex-lap and open JOSELUIS on 2/14/25.     Plan:  - DVT ppx: LVX  - LR @ 100  - Pain control: IV Tylenol, Dilaudid PCA   - Diet: NPO  - OOB and ambulating as tolerated  - PT Consult    ACS/Trauma Surgery  [id number]

## 2025-02-17 NOTE — PROVIDER CONTACT NOTE (CRITICAL VALUE NOTIFICATION) - ASSESSMENT
pt alert and oriented. vss. pt w/ no symptoms of low glucose. pt OOB ambulating w/ PT. pt w/ NGTLLWS pt NPO. pt not a diabetic

## 2025-02-18 LAB
ANION GAP SERPL CALC-SCNC: 15 MMOL/L — SIGNIFICANT CHANGE UP (ref 5–17)
BUN SERPL-MCNC: 14 MG/DL — SIGNIFICANT CHANGE UP (ref 7–23)
CALCIUM SERPL-MCNC: 9.2 MG/DL — SIGNIFICANT CHANGE UP (ref 8.4–10.5)
CHLORIDE SERPL-SCNC: 102 MMOL/L — SIGNIFICANT CHANGE UP (ref 96–108)
CO2 SERPL-SCNC: 22 MMOL/L — SIGNIFICANT CHANGE UP (ref 22–31)
CREAT SERPL-MCNC: 0.85 MG/DL — SIGNIFICANT CHANGE UP (ref 0.5–1.3)
EGFR: 90 ML/MIN/1.73M2 — SIGNIFICANT CHANGE UP
GLUCOSE BLDC GLUCOMTR-MCNC: 47 MG/DL — CRITICAL LOW (ref 70–99)
GLUCOSE BLDC GLUCOMTR-MCNC: 50 MG/DL — CRITICAL LOW (ref 70–99)
GLUCOSE BLDC GLUCOMTR-MCNC: 73 MG/DL — SIGNIFICANT CHANGE UP (ref 70–99)
GLUCOSE BLDC GLUCOMTR-MCNC: 76 MG/DL — SIGNIFICANT CHANGE UP (ref 70–99)
GLUCOSE SERPL-MCNC: 42 MG/DL — CRITICAL LOW (ref 70–99)
HCT VFR BLD CALC: 35.8 % — LOW (ref 39–50)
HGB BLD-MCNC: 11.9 G/DL — LOW (ref 13–17)
MAGNESIUM SERPL-MCNC: 1.9 MG/DL — SIGNIFICANT CHANGE UP (ref 1.6–2.6)
MCHC RBC-ENTMCNC: 30.9 PG — SIGNIFICANT CHANGE UP (ref 27–34)
MCHC RBC-ENTMCNC: 33.2 G/DL — SIGNIFICANT CHANGE UP (ref 32–36)
MCV RBC AUTO: 93 FL — SIGNIFICANT CHANGE UP (ref 80–100)
NRBC BLD AUTO-RTO: 0 /100 WBCS — SIGNIFICANT CHANGE UP (ref 0–0)
PHOSPHATE SERPL-MCNC: 2.6 MG/DL — SIGNIFICANT CHANGE UP (ref 2.5–4.5)
PLATELET # BLD AUTO: 223 K/UL — SIGNIFICANT CHANGE UP (ref 150–400)
POTASSIUM SERPL-MCNC: 3.9 MMOL/L — SIGNIFICANT CHANGE UP (ref 3.5–5.3)
POTASSIUM SERPL-SCNC: 3.9 MMOL/L — SIGNIFICANT CHANGE UP (ref 3.5–5.3)
RBC # BLD: 3.85 M/UL — LOW (ref 4.2–5.8)
RBC # FLD: 14.4 % — SIGNIFICANT CHANGE UP (ref 10.3–14.5)
SODIUM SERPL-SCNC: 139 MMOL/L — SIGNIFICANT CHANGE UP (ref 135–145)
WBC # BLD: 5.28 K/UL — SIGNIFICANT CHANGE UP (ref 3.8–10.5)
WBC # FLD AUTO: 5.28 K/UL — SIGNIFICANT CHANGE UP (ref 3.8–10.5)

## 2025-02-18 PROCEDURE — 99222 1ST HOSP IP/OBS MODERATE 55: CPT

## 2025-02-18 RX ORDER — DEXTROSE 50 % IN WATER 50 %
12.5 SYRINGE (ML) INTRAVENOUS ONCE
Refills: 0 | Status: DISCONTINUED | OUTPATIENT
Start: 2025-02-18 | End: 2025-02-23

## 2025-02-18 RX ORDER — SODIUM CHLORIDE 9 G/1000ML
1000 INJECTION, SOLUTION INTRAVENOUS
Refills: 0 | Status: DISCONTINUED | OUTPATIENT
Start: 2025-02-18 | End: 2025-02-22

## 2025-02-18 RX ORDER — POTASSIUM PHOSPHATE, MONOBASIC POTASSIUM PHOSPHATE, DIBASIC INJECTION, 236; 224 MG/ML; MG/ML
30 SOLUTION, CONCENTRATE INTRAVENOUS ONCE
Refills: 0 | Status: COMPLETED | OUTPATIENT
Start: 2025-02-18 | End: 2025-02-18

## 2025-02-18 RX ADMIN — POTASSIUM PHOSPHATE, MONOBASIC POTASSIUM PHOSPHATE, DIBASIC INJECTION, 83.33 MILLIMOLE(S): 236; 224 SOLUTION, CONCENTRATE INTRAVENOUS at 09:32

## 2025-02-18 RX ADMIN — Medication 400 MILLIGRAM(S): at 01:01

## 2025-02-18 RX ADMIN — METOPROLOL SUCCINATE 5 MILLIGRAM(S): 50 TABLET, EXTENDED RELEASE ORAL at 05:55

## 2025-02-18 RX ADMIN — Medication 30 MILLILITER(S): at 07:13

## 2025-02-18 RX ADMIN — Medication 1000 MILLIGRAM(S): at 01:31

## 2025-02-18 RX ADMIN — ENOXAPARIN SODIUM 40 MILLIGRAM(S): 100 INJECTION SUBCUTANEOUS at 05:55

## 2025-02-18 RX ADMIN — METOPROLOL SUCCINATE 5 MILLIGRAM(S): 50 TABLET, EXTENDED RELEASE ORAL at 19:17

## 2025-02-18 RX ADMIN — METOPROLOL SUCCINATE 5 MILLIGRAM(S): 50 TABLET, EXTENDED RELEASE ORAL at 12:47

## 2025-02-18 RX ADMIN — Medication 30 MILLILITER(S): at 19:18

## 2025-02-18 RX ADMIN — SODIUM CHLORIDE 100 MILLILITER(S): 9 INJECTION, SOLUTION INTRAVENOUS at 09:33

## 2025-02-18 RX ADMIN — METOPROLOL SUCCINATE 5 MILLIGRAM(S): 50 TABLET, EXTENDED RELEASE ORAL at 01:03

## 2025-02-18 RX ADMIN — Medication 40 MILLIGRAM(S): at 05:55

## 2025-02-18 NOTE — PROVIDER CONTACT NOTE (CRITICAL VALUE NOTIFICATION) - ACTION/TREATMENT ORDERED:
PA aware. IVF changed to dextrose and NS. more dextrose to be ordered and given. Will continue to monitor.
Md aware. repeated fingerstick 57 Will give dextrose once ordered. Will continue to monitor.

## 2025-02-18 NOTE — DIETITIAN INITIAL EVALUATION ADULT - ENERGY INTAKE
Pt NPO since 2/14, NGT discontinued on 2/17, still no ostomy output noted. Receiving D5W. TPN team consulted in setting of inadequate intake, pt wishing to holdoff on TPN today and think about it.   NPO

## 2025-02-18 NOTE — DIETITIAN INITIAL EVALUATION ADULT - REASON FOR ADMISSION
Intestinal obstruction    Chart reviewed, event noted. This is a "79M with PMHx rectal cancer s/p neoadjuvant chemo and APR (Dr. Ruiz 2010), throat cancer s/p chemo/radiation, and perforated gastric ulcer s/p Kash patch with falciform (Dr. Akhtar, 2024), presenting with closed loop bowel obstruction. He is now s/p ex-lap and open JOSELUIS on 2/14/25. "

## 2025-02-18 NOTE — PROGRESS NOTE ADULT - ASSESSMENT
SBO  s/p repair  off NGT  but pt is not passing gas  plan as per surgery     HTN  cont IV BB for now  resume PO once able to take     DVT proph  on lovenox

## 2025-02-18 NOTE — PROGRESS NOTE ADULT - ASSESSMENT
79M with PMHx rectal cancer s/p neoadjuvant chemo and APR (Dr. Ruiz 2010), throat cancer s/p chemo/radiation, and perforated gastric ulcer s/p Kash patch with falciform (Dr. Akhtar, 2024), presenting with closed loop bowel obstruction. He is now s/p ex-lap and open JOSELUIS on 2/14/25.     Plan:  - DVT ppx: LVX  - LR @ 100  - Pain control: IV Tylenol, Dilaudid PCA   - Diet: NPO  - OOB and ambulating as tolerated  - PT eval  - Dispo: pending return of bowel function      ACS/Trauma Surgery  [id number], [phone]    79M with PMHx rectal cancer s/p neoadjuvant chemo and APR (Dr. Ruiz 2010), throat cancer s/p chemo/radiation, and perforated gastric ulcer s/p Kash patch with falciform (Dr. Akhtar, 2024), presenting with closed loop bowel obstruction. He is now s/p ex-lap and open JOSELUIS on 2/14/25.     Plan:  - DVT ppx: LVX  - LR @ 100  - Pain control: IV Tylenol, Dilaudid PCA   - Diet: NPO   - OOB and ambulating as tolerated  - PT eval  - Dispo: pending return of bowel function  - Dressing change on POD5       ACS/Trauma Surgery  [id number], [phone]

## 2025-02-18 NOTE — ADVANCED PRACTICE NURSE CONSULT - ASSESSMENT
Chart reviewed & events noted to date. In to see pt earlier, pt happy that NGT was removed but disappointed he remains without bowel function. Pt denies N/V, however no flatus/stool noted in pouch. Encourage OOB and ambulate as able. Discussed plan of care regarding pouching. Aquacel surgical dressing to be removed tomorrow POD#5 and will coordinate with surgical team removal of dressing or Ostomy RNs can remove and perform complete pouching system change as pouching system over dressing. Discussed above with patient and spoke with pt's spouse yesterday afternoon regarding the same. Spouse endorses ordering patient Augustus 1" precut convex skin barrier and would like to be updated regarding his current stoma size and if there is a need to change products. Will discuss above with my colleagues and they will f/u with spouse regarding the same. Support & encouragement provided.

## 2025-02-18 NOTE — DIETITIAN INITIAL EVALUATION ADULT - ADD RECOMMEND
1) defer PO diet advancement to team.   2) If Parenteral nutrition is within GOC and initiated consider GOAL PN: 120g amino acids, 210 g dextrose,65 g SMOF lipid; to provide 1844 kcal/day (29 kcal/kg and 1.9 g/kg protein based on dosing wt 63.5 kg)  Non-Protein Calories: 1364 kcal/day (21 kcal/kg, based on dosing wt 63.5 kg)  Dextrose Infusion Rate: 2.3 mg/kg/min (24-hour infusion)  Lipid Infusion Rate: 0.98 g/kg; 0.08 g/kg/hr (12-hour infusion)  -Recommend addition of multivitamin, thiamine 100mg x 5 days in PN  3. Malnutrition alert placed in EMR

## 2025-02-18 NOTE — DIETITIAN INITIAL EVALUATION ADULT - PERTINENT MEDS FT
MEDICATIONS  (STANDING):  benzocaine 20% Spray 1 Spray(s) Topical once  dextrose 5% + sodium chloride 0.9%. 1000 milliLiter(s) (100 mL/Hr) IV Continuous <Continuous>  dextrose 50% Injectable 12.5 Gram(s) IV Push once  dextrose 50% Injectable 12.5 Gram(s) IV Push once  enoxaparin Injectable 40 milliGRAM(s) SubCutaneous every 24 hours  HYDROmorphone PCA (1 mG/mL) 30 milliLiter(s) PCA Continuous PCA Continuous  influenza  Vaccine (HIGH DOSE) 0.5 milliLiter(s) IntraMuscular once  metoprolol tartrate Injectable 5 milliGRAM(s) IV Push every 6 hours  pantoprazole  Injectable 40 milliGRAM(s) IV Push every 24 hours    MEDICATIONS  (PRN):  HYDROmorphone PCA (1 mG/mL) Rescue Clinician Bolus 0.5 milliGRAM(s) IV Push every 15 minutes PRN for Pain Scale GREATER THAN 6  nalbuphine Injectable 2.5 milliGRAM(s) IV Push every 6 hours PRN Pruritus  naloxone Injectable 0.1 milliGRAM(s) IV Push every 3 minutes PRN For ANY of the following changes in patient status:  A. RR LESS THAN 10 breaths per minute, B. Oxygen saturation LESS THAN 90%, C. Sedation score of 6  ondansetron Injectable 4 milliGRAM(s) IV Push every 6 hours PRN Nausea

## 2025-02-18 NOTE — PROGRESS NOTE ADULT - SUBJECTIVE AND OBJECTIVE BOX
Day __ of Anesthesia Pain Management Service    SUBJECTIVE: Patient is doing well with IV PCA    Pain Scale Score:	[X] Refer to charted pain scores    THERAPY:    [ ] IV PCA Morphine		[ ] 5 mg/mL	[ ] 1 mg/mL  [X] IV PCA Hydromorphone	[ ] 5 mg/mL	[X] 1 mg/mL  [ ] IV PCA Fentanyl		[ ] 50 micrograms/mL    Demand dose: 0.2 mg     Lockout: 6 minutes   Continuous Rate: 0 mg/hr  4 Hour Limit: 4 mg    MEDICATIONS  (STANDING):  benzocaine 20% Spray 1 Spray(s) Topical once  dextrose 5% + sodium chloride 0.9%. 1000 milliLiter(s) (100 mL/Hr) IV Continuous <Continuous>  dextrose 50% Injectable 12.5 Gram(s) IV Push once  enoxaparin Injectable 40 milliGRAM(s) SubCutaneous every 24 hours  HYDROmorphone PCA (1 mG/mL) 30 milliLiter(s) PCA Continuous PCA Continuous  influenza  Vaccine (HIGH DOSE) 0.5 milliLiter(s) IntraMuscular once  metoprolol tartrate Injectable 5 milliGRAM(s) IV Push every 6 hours  pantoprazole  Injectable 40 milliGRAM(s) IV Push every 24 hours    MEDICATIONS  (PRN):  HYDROmorphone PCA (1 mG/mL) Rescue Clinician Bolus 0.5 milliGRAM(s) IV Push every 15 minutes PRN for Pain Scale GREATER THAN 6  nalbuphine Injectable 2.5 milliGRAM(s) IV Push every 6 hours PRN Pruritus  naloxone Injectable 0.1 milliGRAM(s) IV Push every 3 minutes PRN For ANY of the following changes in patient status:  A. RR LESS THAN 10 breaths per minute, B. Oxygen saturation LESS THAN 90%, C. Sedation score of 6  ondansetron Injectable 4 milliGRAM(s) IV Push every 6 hours PRN Nausea      OBJECTIVE:    Sedation Score:	[ X] Alert	[ ] Drowsy 	[ ] Arousable	[ ] Asleep	[ ] Unresponsive    Side Effects:	[X ] None	[ ] Nausea	[ ] Vomiting	[ ] Pruritus  		[ ] Other:    Vital Signs Last 24 Hrs  T(C): 36.8 (18 Feb 2025 05:01), Max: 36.9 (17 Feb 2025 09:18)  T(F): 98.3 (18 Feb 2025 05:01), Max: 98.5 (17 Feb 2025 09:18)  HR: 69 (18 Feb 2025 05:01) (54 - 74)  BP: 111/74 (18 Feb 2025 05:01) (111/74 - 153/70)  BP(mean): --  RR: 18 (18 Feb 2025 05:01) (18 - 18)  SpO2: 97% (18 Feb 2025 05:01) (94% - 97%)    Parameters below as of 18 Feb 2025 05:01  Patient On (Oxygen Delivery Method): room air        ASSESSMENT/ PLAN    Therapy to  be:               [X] Continued   [ ] Discontinued   [ ] Changed to PRN Analgesics    Documentation and Verification of current medications:   [X] Done	[ ] Not done, not eligible    Comments:

## 2025-02-18 NOTE — DIETITIAN INITIAL EVALUATION ADULT - OTHER INFO
Weight: pt reports UBW as ~ 170lbs back in June prior to throat cancer dx. Weight was 151.4 lbs (8/11/24). Pt believes current weight to be ~ 139-140lbs. Current dosing weight is 139.7lbs

## 2025-02-18 NOTE — PROGRESS NOTE ADULT - SUBJECTIVE AND OBJECTIVE BOX
SURGERY DAILY PROGRESS NOTE     Patient is a 79y old  Male who presents with a chief complaint of closed loop SBO (17 Feb 2025 12:31)        24 HOUR EVENTS: NGT d/c'ed yesterday. Still without ostomy output despite digitization.     OVERNIGHT EVENTS: NAEON      SUBJECTIVE:   Patient seen and examined at bedside with surgical team, patient without complaints.       OBJECTIVE     Vital Signs Last 24 Hrs  T(C): 36.9 (18 Feb 2025 00:26), Max: 36.9 (17 Feb 2025 09:18)  T(F): 98.4 (18 Feb 2025 00:26), Max: 98.5 (17 Feb 2025 09:18)  HR: 60 (18 Feb 2025 00:26) (54 - 74)  BP: 140/79 (18 Feb 2025 00:26) (121/69 - 153/70)  BP(mean): --  RR: 18 (18 Feb 2025 00:26) (18 - 18)  SpO2: 97% (18 Feb 2025 00:26) (94% - 97%)    Parameters below as of 18 Feb 2025 00:26  Patient On (Oxygen Delivery Method): room air    I&O's Detail    16 Feb 2025 07:01  -  17 Feb 2025 07:00  --------------------------------------------------------  IN:    IV PiggyBack: 500 mL    IV PiggyBack: 100 mL    Lactated Ringers: 2100 mL  Total IN: 2700 mL    OUT:    Colostomy (mL): 0 mL    Nasogastric/Oral tube (mL): 75 mL    Oral Fluid: 0 mL    Voided (mL): 1550 mL  Total OUT: 1625 mL    Total NET: 1075 mL      17 Feb 2025 07:01  -  18 Feb 2025 02:32  --------------------------------------------------------  IN:    Lactated Ringers: 1200 mL  Total IN: 1200 mL    OUT:    Colostomy (mL): 0 mL    Voided (mL): 1700 mL  Total OUT: 1700 mL    Total NET: -500 mL          Physical Exam  Constitutional: NAD  Respiratory: Breathing comfortably on RA  Gastrointestinal: ***  Extremities: Moving all extremities, no edema, warm and well-perfused  Psychiatric:  A&Ox3, appropriate affect          Medications  MEDICATIONS  (STANDING):  benzocaine 20% Spray 1 Spray(s) Topical once  dextrose 50% Injectable 12.5 Gram(s) IV Push once  enoxaparin Injectable 40 milliGRAM(s) SubCutaneous every 24 hours  HYDROmorphone PCA (1 mG/mL) 30 milliLiter(s) PCA Continuous PCA Continuous  influenza  Vaccine (HIGH DOSE) 0.5 milliLiter(s) IntraMuscular once  lactated ringers. 1000 milliLiter(s) (100 mL/Hr) IV Continuous <Continuous>  metoprolol tartrate Injectable 5 milliGRAM(s) IV Push every 6 hours  pantoprazole  Injectable 40 milliGRAM(s) IV Push every 24 hours    MEDICATIONS  (PRN):  HYDROmorphone PCA (1 mG/mL) Rescue Clinician Bolus 0.5 milliGRAM(s) IV Push every 15 minutes PRN for Pain Scale GREATER THAN 6  nalbuphine Injectable 2.5 milliGRAM(s) IV Push every 6 hours PRN Pruritus  naloxone Injectable 0.1 milliGRAM(s) IV Push every 3 minutes PRN For ANY of the following changes in patient status:  A. RR LESS THAN 10 breaths per minute, B. Oxygen saturation LESS THAN 90%, C. Sedation score of 6  ondansetron Injectable 4 milliGRAM(s) IV Push every 6 hours PRN Nausea        LABS:                        12.0   5.37  )-----------( 222      ( 17 Feb 2025 07:28 )             36.1     02-17    139  |  102  |  21  ----------------------------<  48[LL]  4.6   |  22  |  0.99    Ca    9.2      17 Feb 2025 07:28  Phos  1.8     02-17  Mg     2.0     02-17          Urinalysis Basic - ( 17 Feb 2025 07:28 )    Color: x / Appearance: x / SG: x / pH: x  Gluc: 48 mg/dL / Ketone: x  / Bili: x / Urobili: x   Blood: x / Protein: x / Nitrite: x   Leuk Esterase: x / RBC: x / WBC x   Sq Epi: x / Non Sq Epi: x / Bacteria: x       SURGERY DAILY PROGRESS NOTE     Patient is a 79y old  Male who presents with a chief complaint of closed loop SBO (17 Feb 2025 12:31)        24 HOUR EVENTS: NGT d/c'ed yesterday. Still without ostomy output despite digitization.     OVERNIGHT EVENTS: NAEON      SUBJECTIVE:   Patient seen and examined at bedside with surgical team, patient without complaints. No ostomy output. The patient is upset regarding NPO status.       OBJECTIVE     Vital Signs Last 24 Hrs  T(C): 36.9 (18 Feb 2025 00:26), Max: 36.9 (17 Feb 2025 09:18)  T(F): 98.4 (18 Feb 2025 00:26), Max: 98.5 (17 Feb 2025 09:18)  HR: 60 (18 Feb 2025 00:26) (54 - 74)  BP: 140/79 (18 Feb 2025 00:26) (121/69 - 153/70)  BP(mean): --  RR: 18 (18 Feb 2025 00:26) (18 - 18)  SpO2: 97% (18 Feb 2025 00:26) (94% - 97%)    Parameters below as of 18 Feb 2025 00:26  Patient On (Oxygen Delivery Method): room air    I&O's Detail    16 Feb 2025 07:01  -  17 Feb 2025 07:00  --------------------------------------------------------  IN:    IV PiggyBack: 500 mL    IV PiggyBack: 100 mL    Lactated Ringers: 2100 mL  Total IN: 2700 mL    OUT:    Colostomy (mL): 0 mL    Nasogastric/Oral tube (mL): 75 mL    Oral Fluid: 0 mL    Voided (mL): 1550 mL  Total OUT: 1625 mL    Total NET: 1075 mL      17 Feb 2025 07:01  -  18 Feb 2025 02:32  --------------------------------------------------------  IN:    Lactated Ringers: 1200 mL  Total IN: 1200 mL    OUT:    Colostomy (mL): 0 mL    Voided (mL): 1700 mL  Total OUT: 1700 mL    Total NET: -500 mL          Physical Exam  Constitutional: NAD  Respiratory: Breathing comfortably on RA  Gastrointestinal: Soft and nontender. No ostomy output. Ostomy pink and viable.   Extremities: Moving all extremities, no edema, warm and well-perfused  Psychiatric:  A&Ox3, appropriate affect          Medications  MEDICATIONS  (STANDING):  benzocaine 20% Spray 1 Spray(s) Topical once  dextrose 50% Injectable 12.5 Gram(s) IV Push once  enoxaparin Injectable 40 milliGRAM(s) SubCutaneous every 24 hours  HYDROmorphone PCA (1 mG/mL) 30 milliLiter(s) PCA Continuous PCA Continuous  influenza  Vaccine (HIGH DOSE) 0.5 milliLiter(s) IntraMuscular once  lactated ringers. 1000 milliLiter(s) (100 mL/Hr) IV Continuous <Continuous>  metoprolol tartrate Injectable 5 milliGRAM(s) IV Push every 6 hours  pantoprazole  Injectable 40 milliGRAM(s) IV Push every 24 hours    MEDICATIONS  (PRN):  HYDROmorphone PCA (1 mG/mL) Rescue Clinician Bolus 0.5 milliGRAM(s) IV Push every 15 minutes PRN for Pain Scale GREATER THAN 6  nalbuphine Injectable 2.5 milliGRAM(s) IV Push every 6 hours PRN Pruritus  naloxone Injectable 0.1 milliGRAM(s) IV Push every 3 minutes PRN For ANY of the following changes in patient status:  A. RR LESS THAN 10 breaths per minute, B. Oxygen saturation LESS THAN 90%, C. Sedation score of 6  ondansetron Injectable 4 milliGRAM(s) IV Push every 6 hours PRN Nausea        LABS:                        12.0   5.37  )-----------( 222      ( 17 Feb 2025 07:28 )             36.1     02-17    139  |  102  |  21  ----------------------------<  48[LL]  4.6   |  22  |  0.99    Ca    9.2      17 Feb 2025 07:28  Phos  1.8     02-17  Mg     2.0     02-17          Urinalysis Basic - ( 17 Feb 2025 07:28 )    Color: x / Appearance: x / SG: x / pH: x  Gluc: 48 mg/dL / Ketone: x  / Bili: x / Urobili: x   Blood: x / Protein: x / Nitrite: x   Leuk Esterase: x / RBC: x / WBC x   Sq Epi: x / Non Sq Epi: x / Bacteria: x

## 2025-02-18 NOTE — CONSULT NOTE ADULT - SUBJECTIVE AND OBJECTIVE BOX
CHIEF COMPLAINT:Patient is a 79y old  Male who presents with a chief complaint of closed loop SBO (15 Feb 2025 03:42)      HISTORY OF PRESENT ILLNESS:    79 male known to me from office with history as below HTN, rectal cancer s/p neoadjuvant chemo and APR (Dr. Ruiz 2010), throat cancer s/p chemo/radiation, and perforated gastric ulcer s/p Kash patch with falciform (Dr. Akhtar, 2024), presenting with closed loop bowel obstruction. He is now s/p ex-lap and open JOSELUIS on 2/14/25.   awake, alert  denies any chest pain, sob, palpitation, dizziness or syncope.     PAST MEDICAL & SURGICAL HISTORY:  Rectal Neoplasm  treated with oral chemothearpy and radiation 5/10- 6/10        Hemorrhoid      Malignant neoplasm of oropharynx      Colostomy status      Neck mass      S/P Colonoscopy  3/10      Rectal cancer      S/P colostomy      History of cancer chemotherapy      S/P radiation therapy              MEDICATIONS:  enoxaparin Injectable 40 milliGRAM(s) SubCutaneous every 24 hours  metoprolol tartrate Injectable 5 milliGRAM(s) IV Push every 6 hours    cefoTEtan  IVPB      cefoTEtan  IVPB 2 Gram(s) IV Intermittent every 12 hours      acetaminophen   IVPB .. 1000 milliGRAM(s) IV Intermittent every 6 hours  HYDROmorphone PCA (1 mG/mL) 30 milliLiter(s) PCA Continuous PCA Continuous  HYDROmorphone PCA (1 mG/mL) Rescue Clinician Bolus 0.5 milliGRAM(s) IV Push every 15 minutes PRN  nalbuphine Injectable 2.5 milliGRAM(s) IV Push every 6 hours PRN  ondansetron Injectable 4 milliGRAM(s) IV Push every 6 hours PRN    pantoprazole  Injectable 40 milliGRAM(s) IV Push every 24 hours      influenza  Vaccine (HIGH DOSE) 0.5 milliLiter(s) IntraMuscular once  lactated ringers. 1000 milliLiter(s) IV Continuous <Continuous>      FAMILY HISTORY:  FH: lung cancer (Father)        Non-contributory    SOCIAL HISTORY:    No tobacco, drugs or etoh    Allergies    No Known Allergies    Intolerances    	    REVIEW OF SYSTEMS:  as above  The rest of the 14 points ROS reviewed and except above they are unremarkable.        PHYSICAL EXAM:  T(C): 36.7 (02-15-25 @ 04:30), Max: 36.8 (02-14-25 @ 17:18)  HR: 75 (02-15-25 @ 06:20) (65 - 109)  BP: 130/69 (02-15-25 @ 06:20) (106/68 - 181/98)  RR: 18 (02-15-25 @ 04:30) (16 - 18)  SpO2: 98% (02-15-25 @ 04:30) (94% - 100%)  Wt(kg): --  I&O's Summary    14 Feb 2025 07:01  -  15 Feb 2025 06:44  --------------------------------------------------------  IN: 150 mL / OUT: 310 mL / NET: -160 mL      JVP: Normal  Neck: supple  Lung: clear   CV: S1 S2 , Murmur:  Abd: soft  Ext: No edema  neuro: Awake / alert  Psych: flat affect  Skin: normal    LABS/DATA:    TELEMETRY: 	    ECG:  	   	  CARDIAC MARKERS:                                      15.8   6.12  )-----------( 299      ( 14 Feb 2025 16:03 )             46.6           proBNP:   Lipid Profile:   HgA1c:   TSH:           
NUTRITION SUPPORT / TPN CONSULT NOTE    HPI:  79 year old man with PMHx rectal cancer s/p neoadjuvant chemo and APR (Dr. Ruiz 2010), throat cancer s/p chemo/radiation, and perforated gastric ulcer s/p Kash patch with falciform (Dr. Akhtar, 2024) admitted on 2/14/25 for closed loop SBO .  Patient underwent ex lap with open JOSELUIS on same day and post op course c/b ileus.  Patient had NGT placed (removed 2/17) and still with no output from ostomy.  TPN team consulted given insufficient enteral intake.  Patient with history of TPN 8/24-11/2024.     24 hour/overnight events:  Patient seen and examined at bedside, chart reviewed and events noted and I's and O's reviewed.  Patient denies chest pain, shortness of breath, nausea or vomiting, dizziness, chills at time of visit.  Patient on PCA for pain control, phos repletion noted.  No leucocytosis, mild anemia and hypoglycemia noted despite IV fluids. Patient's VSS except elevated SBP and intermittent bradycardia and no other acute overnight events noted.   Patient was eating a regular diet prior to admission however reports weight loss since June 2024    ROS:  Except as noted above, all other systems reviewed and are negative       MEDICATIONS  (STANDING):  benzocaine 20% Spray 1 Spray(s) Topical once  dextrose 5% + sodium chloride 0.9%. 1000 milliLiter(s) (100 mL/Hr) IV Continuous <Continuous>  dextrose 50% Injectable 12.5 Gram(s) IV Push once  dextrose 50% Injectable 12.5 Gram(s) IV Push once  enoxaparin Injectable 40 milliGRAM(s) SubCutaneous every 24 hours  HYDROmorphone PCA (1 mG/mL) 30 milliLiter(s) PCA Continuous PCA Continuous  influenza  Vaccine (HIGH DOSE) 0.5 milliLiter(s) IntraMuscular once  metoprolol tartrate Injectable 5 milliGRAM(s) IV Push every 6 hours  pantoprazole  Injectable 40 milliGRAM(s) IV Push every 24 hours    MEDICATIONS  (PRN):  HYDROmorphone PCA (1 mG/mL) Rescue Clinician Bolus 0.5 milliGRAM(s) IV Push every 15 minutes PRN for Pain Scale GREATER THAN 6  nalbuphine Injectable 2.5 milliGRAM(s) IV Push every 6 hours PRN Pruritus  naloxone Injectable 0.1 milliGRAM(s) IV Push every 3 minutes PRN For ANY of the following changes in patient status:  A. RR LESS THAN 10 breaths per minute, B. Oxygen saturation LESS THAN 90%, C. Sedation score of 6  ondansetron Injectable 4 milliGRAM(s) IV Push every 6 hours PRN Nausea      PAST MEDICAL & SURGICAL HISTORY:  Rectal Neoplasm  treated with oral chemothearpy and radiation 5/10- 6/10        Hemorrhoid      Malignant neoplasm of oropharynx      Colostomy status      Neck mass      S/P Colonoscopy  3/10      Rectal cancer      S/P colostomy      History of cancer chemotherapy      S/P radiation therapy          FAMILY HISTORY:  FH: lung cancer (Father)        ALLERGIES  No Known Allergies        VITALS  T(C): 36.9 (02-18-25 @ 13:28), Max: 36.9 (02-18-25 @ 00:26)  HR: 59 (02-18-25 @ 13:28) (54 - 69)  BP: 188/82 (02-18-25 @ 13:28) (111/74 - 188/82)  RR: 18 (02-18-25 @ 13:28) (18 - 18)  SpO2: 97% (02-18-25 @ 13:28) (96% - 97%)  I&O's Detail    17 Feb 2025 07:01  -  18 Feb 2025 07:00  --------------------------------------------------------  IN:    IV PiggyBack: 100 mL    Lactated Ringers: 2400 mL  Total IN: 2500 mL    OUT:    Colostomy (mL): 0 mL    Voided (mL): 2275 mL  Total OUT: 2275 mL    Total NET: 225 mL      18 Feb 2025 07:01  -  18 Feb 2025 13:47  --------------------------------------------------------  IN:  Total IN: 0 mL    OUT:    Colostomy (mL): 0 mL  Total OUT: 0 mL    Total NET: 0 mL            LABS:                        11.9   5.28  )-----------( 223      ( 18 Feb 2025 08:34 )             35.8     02-18    139  |  102  |  14  ----------------------------<  42[LL]  3.9   |  22  |  0.85    Ca    9.2      18 Feb 2025 08:34  Phos  2.6     02-18  Mg     1.9     02-18      Ionized Calcium Levels:     CAPILLARY BLOOD GLUCOSE      POCT Blood Glucose.: 76 mg/dL (18 Feb 2025 13:36)  CAPILLARY BLOOD GLUCOSE      POCT Blood Glucose.: 76 mg/dL (18 Feb 2025 13:36)  POCT Blood Glucose.: 73 mg/dL (18 Feb 2025 11:32)  POCT Blood Glucose.: 50 mg/dL (18 Feb 2025 09:27)  POCT Blood Glucose.: 47 mg/dL (18 Feb 2025 09:26)            PHYSICAL EXAM  --------------------------------------------------------------------------------    Physical Exam:  Gen: NAD, laying in bed   HEENT:  supple neck, no JVD  Chest: non labored breathing, equal chest expansion bilaterally  CV: RRR, S1S2  Abd: Soft and nontender. No ostomy output. Ostomy pink and viable.   : No suprapubic tenderness  Ext: Warm, FROM, no edema b/l LE  Neuro: No focal deficits  Psych: Normal affect and mood  Skin: Warm, without rashes

## 2025-02-18 NOTE — DIETITIAN INITIAL EVALUATION ADULT - PERTINENT LABORATORY DATA
02-18    139  |  102  |  14  ----------------------------<  42[LL]  3.9   |  22  |  0.85    Ca    9.2      18 Feb 2025 08:34  Phos  2.6     02-18  Mg     1.9     02-18    POCT Blood Glucose.: 73 mg/dL (02-18-25 @ 11:32)  A1C with Estimated Average Glucose Result: 5.8 % (08-12-24 @ 16:28)

## 2025-02-18 NOTE — PROGRESS NOTE ADULT - SUBJECTIVE AND OBJECTIVE BOX
Subjective: Patient seen and examined. No new events except as noted.     SUBJECTIVE/ROS:  nad      MEDICATIONS:  MEDICATIONS  (STANDING):  benzocaine 20% Spray 1 Spray(s) Topical once  dextrose 5% + sodium chloride 0.9%. 1000 milliLiter(s) (100 mL/Hr) IV Continuous <Continuous>  dextrose 50% Injectable 12.5 Gram(s) IV Push once  enoxaparin Injectable 40 milliGRAM(s) SubCutaneous every 24 hours  HYDROmorphone PCA (1 mG/mL) 30 milliLiter(s) PCA Continuous PCA Continuous  influenza  Vaccine (HIGH DOSE) 0.5 milliLiter(s) IntraMuscular once  metoprolol tartrate Injectable 5 milliGRAM(s) IV Push every 6 hours  pantoprazole  Injectable 40 milliGRAM(s) IV Push every 24 hours  potassium phosphate IVPB 30 milliMole(s) IV Intermittent once      PHYSICAL EXAM:  T(C): 36.8 (02-18-25 @ 05:01), Max: 36.9 (02-18-25 @ 00:26)  HR: 69 (02-18-25 @ 05:01) (54 - 69)  BP: 111/74 (02-18-25 @ 05:01) (111/74 - 153/70)  RR: 18 (02-18-25 @ 05:01) (18 - 18)  SpO2: 97% (02-18-25 @ 05:01) (94% - 97%)  Wt(kg): --  I&O's Summary    17 Feb 2025 07:01  -  18 Feb 2025 07:00  --------------------------------------------------------  IN: 2500 mL / OUT: 2275 mL / NET: 225 mL            JVP: Normal  Neck: supple  Lung: clear   CV: S1 S2 , Murmur:  Abd: soft  Ext: No edema  neuro: Awake / alert  Psych: flat affect  Skin: normal``    LABS/DATA:    CARDIAC MARKERS:                                11.9   5.28  )-----------( 223      ( 18 Feb 2025 08:34 )             35.8     02-18    139  |  102  |  14  ----------------------------<  42[LL]  3.9   |  22  |  0.85    Ca    9.2      18 Feb 2025 08:34  Phos  2.6     02-18  Mg     1.9     02-18      proBNP:   Lipid Profile:   HgA1c:   TSH:     TELE:  EKG:

## 2025-02-18 NOTE — CONSULT NOTE ADULT - ASSESSMENT
79 year old man with PMHx rectal cancer s/p neoadjuvant chemo and APR (Dr. Ruiz 2010), throat cancer s/p chemo/radiation, and perforated gastric ulcer s/p Kash patch with falciform (Dr. Akhtar, 2024) admitted on 2/14/25 for closed loop SBO .  Patient underwent ex lap with open JOSELUIS on same day and post op course c/b ileus.  Patient had NGT placed (removed 2/17) and still with no output from ostomy.  TPN team consulted given insufficient enteral intake.  Patient with history of TPN 8/24-11/2024.     GOAL PN: 120g amino acids, 210 g dextrose,65 g SMOF lipid; to provide 1844 kcal/day (29 kcal/kg and 1.9 g/kg protein based on dosing wt 63.5 kg)  Non-Protein Calories: 1364 kcal/day (21 kcal/kg, based on dosing wt 63.5 kg); Dextrose Infusion Rate: 2.3 mg/kg/min (24-hour infusion); Lipid Infusion Rate: 0.98 g/kg; 0.08 g/kg/hr (12-hour infusion)        - Nutritional Assessment, patient with severe protein calorie malnutrition not meeting nutritional goals enterally due to ileus/NPO status and would likely benefit from TPN for nutritional support.  - TPN plan:  Discussed TPN initiation with pros/cons; patient would like to wait one day prior to resuming TPN  - TPN access:  Patient will need a dedicated central line if/once TPN approved  - Recommend checking baseline nutrition parameters:  TSH, Prealbumin, Lipids, HgA1c, iCal, Mag, Phos  - Anemia:  check iron studies, B12, Folate levels  - Electrolyte Imbalance risk:  recommend to check CMP, Mg, Phos and ionized Ca daily, to be supplemented peripherally per primary team.  - Ileus:  monitor ostomy output overnight, f/u surgical plan and nutrition parameters in AM  - IV fluids per primary team; - IV fluids per primary team; can add MVI and Thiamine IV to help decrease risk of refeeding syndrome once nutrition, whether TPN vs enteral, resumes   - Recommend strict intake and output/weight checks three times a week  - Hypoglycemia:  check HgA1c; finger sticks every 6 hours to monitor glucose trend  - Risk for Hypertriglyceridemia with TPN:  plan to check baseline lipid profile in AM and monitor TG closely once/if TPN initiated   - Global care per primary team    Available on TEAMS  TPN spectra [id number] ([phone] when dialing from outside line)  M-F 8A-2P, Weekends and holidays 8/9A-12/1P  Discussed with Dr. Gerardo Silverio    Time-based billing (NON-critical care).     55 minutes spent on total encounter. The necessity of the time spent during the encounter on this date of service was due to chart review, lab/radiology test review, patient assessment, discussion and collaboration with interdisciplinary team members and ordering TPN/making recommendation. 
SBO  s/p repair  abx  plan as per surgery     HTN  cont IV BB for now  resume PO once able to take     DVT proph  on lovenox     Advanced care planning was discussed with patient and family.  Risks, benefits and alternatives of the cardiac treatments and medical therapy including procedures were discussed in detail and all questions were answered. Importance of compliance with medical therapy and lifestyle modification to improve cardiovascular health were addressed. Appropriate forms and patient educational materials were reviewed. 30 minutes face to face spent.

## 2025-02-18 NOTE — DIETITIAN INITIAL EVALUATION ADULT - NS FNS DIET ORDER
Diet, NPO:   Except Medications  With Ice Chips/Sips of Water     Special Instructions for Nursing:  Except Medications (02-18-25 @ 11:46)

## 2025-02-18 NOTE — DIETITIAN INITIAL EVALUATION ADULT - ORAL INTAKE PTA/DIET HISTORY
Pt admitted with 2 days of abdominal pain and decreased ostomy output. Pt reports following a regular diet, for the last few weeks he says he has been eating more "normal", consuming foods like steak, cereal. States he was previously consuming softer foods due to throat cancer and treatment (chemo/radiation). Reports wife makes him a protein drink consisting of Boost, milk and 1/2 packet of carination instant breakfast. Dislikes Ensures. NKFA. Pt previously on TPN, during last admission and was discharge home with it. Reports he was on it for a few months but hasn't been receiving it recently. Unable to recall last date received. Pt denies chewing/swallowing difficulty, nausea, vomiting PTA.

## 2025-02-19 LAB
A1C WITH ESTIMATED AVERAGE GLUCOSE RESULT: 5.1 % — SIGNIFICANT CHANGE UP (ref 4–5.6)
ALBUMIN SERPL ELPH-MCNC: 2.8 G/DL — LOW (ref 3.3–5)
ALP SERPL-CCNC: 50 U/L — SIGNIFICANT CHANGE UP (ref 40–120)
ALT FLD-CCNC: 9 U/L — LOW (ref 10–45)
ANION GAP SERPL CALC-SCNC: 14 MMOL/L — SIGNIFICANT CHANGE UP (ref 5–17)
AST SERPL-CCNC: 31 U/L — SIGNIFICANT CHANGE UP (ref 10–40)
BASOPHILS # BLD AUTO: 0.01 K/UL — SIGNIFICANT CHANGE UP (ref 0–0.2)
BASOPHILS NFR BLD AUTO: 0.2 % — SIGNIFICANT CHANGE UP (ref 0–2)
BILIRUB SERPL-MCNC: 0.4 MG/DL — SIGNIFICANT CHANGE UP (ref 0.2–1.2)
BUN SERPL-MCNC: 11 MG/DL — SIGNIFICANT CHANGE UP (ref 7–23)
CA-I BLD-SCNC: 1.2 MMOL/L — SIGNIFICANT CHANGE UP (ref 1.15–1.33)
CALCIUM SERPL-MCNC: 8.8 MG/DL — SIGNIFICANT CHANGE UP (ref 8.4–10.5)
CHLORIDE SERPL-SCNC: 105 MMOL/L — SIGNIFICANT CHANGE UP (ref 96–108)
CHOLEST SERPL-MCNC: 143 MG/DL — SIGNIFICANT CHANGE UP
CO2 SERPL-SCNC: 22 MMOL/L — SIGNIFICANT CHANGE UP (ref 22–31)
CREAT SERPL-MCNC: 0.82 MG/DL — SIGNIFICANT CHANGE UP (ref 0.5–1.3)
EGFR: 90 ML/MIN/1.73M2 — SIGNIFICANT CHANGE UP
EOSINOPHIL # BLD AUTO: 0.12 K/UL — SIGNIFICANT CHANGE UP (ref 0–0.5)
EOSINOPHIL NFR BLD AUTO: 1.8 % — SIGNIFICANT CHANGE UP (ref 0–6)
ESTIMATED AVERAGE GLUCOSE: 100 MG/DL — SIGNIFICANT CHANGE UP (ref 68–114)
GLUCOSE SERPL-MCNC: 125 MG/DL — HIGH (ref 70–99)
HCT VFR BLD CALC: 37.7 % — LOW (ref 39–50)
HDLC SERPL-MCNC: 38 MG/DL — LOW
HGB BLD-MCNC: 12.8 G/DL — LOW (ref 13–17)
IMM GRANULOCYTES NFR BLD AUTO: 0.5 % — SIGNIFICANT CHANGE UP (ref 0–0.9)
LIPID PNL WITH DIRECT LDL SERPL: 83 MG/DL — SIGNIFICANT CHANGE UP
LYMPHOCYTES # BLD AUTO: 0.35 K/UL — LOW (ref 1–3.3)
LYMPHOCYTES # BLD AUTO: 5.4 % — LOW (ref 13–44)
MAGNESIUM SERPL-MCNC: 1.9 MG/DL — SIGNIFICANT CHANGE UP (ref 1.6–2.6)
MCHC RBC-ENTMCNC: 30.5 PG — SIGNIFICANT CHANGE UP (ref 27–34)
MCHC RBC-ENTMCNC: 34 G/DL — SIGNIFICANT CHANGE UP (ref 32–36)
MCV RBC AUTO: 89.8 FL — SIGNIFICANT CHANGE UP (ref 80–100)
MONOCYTES # BLD AUTO: 0.47 K/UL — SIGNIFICANT CHANGE UP (ref 0–0.9)
MONOCYTES NFR BLD AUTO: 7.2 % — SIGNIFICANT CHANGE UP (ref 2–14)
NEUTROPHILS # BLD AUTO: 5.55 K/UL — SIGNIFICANT CHANGE UP (ref 1.8–7.4)
NEUTROPHILS NFR BLD AUTO: 84.9 % — HIGH (ref 43–77)
NON HDL CHOLESTEROL: 105 MG/DL — SIGNIFICANT CHANGE UP
NRBC BLD AUTO-RTO: 0 /100 WBCS — SIGNIFICANT CHANGE UP (ref 0–0)
PHOSPHATE SERPL-MCNC: 2 MG/DL — LOW (ref 2.5–4.5)
PLATELET # BLD AUTO: 274 K/UL — SIGNIFICANT CHANGE UP (ref 150–400)
POTASSIUM SERPL-MCNC: 3.8 MMOL/L — SIGNIFICANT CHANGE UP (ref 3.5–5.3)
POTASSIUM SERPL-SCNC: 3.8 MMOL/L — SIGNIFICANT CHANGE UP (ref 3.5–5.3)
PREALB SERPL-MCNC: 8 MG/DL — LOW (ref 20–40)
PROT SERPL-MCNC: 5.4 G/DL — LOW (ref 6–8.3)
RBC # BLD: 4.2 M/UL — SIGNIFICANT CHANGE UP (ref 4.2–5.8)
RBC # FLD: 14 % — SIGNIFICANT CHANGE UP (ref 10.3–14.5)
SODIUM SERPL-SCNC: 141 MMOL/L — SIGNIFICANT CHANGE UP (ref 135–145)
TRIGL SERPL-MCNC: 119 MG/DL — SIGNIFICANT CHANGE UP
TSH SERPL-MCNC: 2.45 UIU/ML — SIGNIFICANT CHANGE UP (ref 0.27–4.2)
WBC # BLD: 6.53 K/UL — SIGNIFICANT CHANGE UP (ref 3.8–10.5)
WBC # FLD AUTO: 6.53 K/UL — SIGNIFICANT CHANGE UP (ref 3.8–10.5)

## 2025-02-19 PROCEDURE — 74177 CT ABD & PELVIS W/CONTRAST: CPT | Mod: 26

## 2025-02-19 PROCEDURE — 99232 SBSQ HOSP IP/OBS MODERATE 35: CPT

## 2025-02-19 RX ORDER — POTASSIUM PHOSPHATE, MONOBASIC POTASSIUM PHOSPHATE, DIBASIC INJECTION, 236; 224 MG/ML; MG/ML
15 SOLUTION, CONCENTRATE INTRAVENOUS ONCE
Refills: 0 | Status: COMPLETED | OUTPATIENT
Start: 2025-02-19 | End: 2025-02-19

## 2025-02-19 RX ADMIN — POTASSIUM PHOSPHATE, MONOBASIC POTASSIUM PHOSPHATE, DIBASIC INJECTION, 62.5 MILLIMOLE(S): 236; 224 SOLUTION, CONCENTRATE INTRAVENOUS at 11:19

## 2025-02-19 RX ADMIN — METOPROLOL SUCCINATE 5 MILLIGRAM(S): 50 TABLET, EXTENDED RELEASE ORAL at 11:19

## 2025-02-19 RX ADMIN — Medication 30 MILLILITER(S): at 18:58

## 2025-02-19 RX ADMIN — SODIUM CHLORIDE 100 MILLILITER(S): 9 INJECTION, SOLUTION INTRAVENOUS at 06:01

## 2025-02-19 RX ADMIN — Medication 40 MILLIGRAM(S): at 05:59

## 2025-02-19 RX ADMIN — METOPROLOL SUCCINATE 5 MILLIGRAM(S): 50 TABLET, EXTENDED RELEASE ORAL at 00:47

## 2025-02-19 RX ADMIN — ENOXAPARIN SODIUM 40 MILLIGRAM(S): 100 INJECTION SUBCUTANEOUS at 05:59

## 2025-02-19 RX ADMIN — Medication 30 MILLILITER(S): at 07:26

## 2025-02-19 RX ADMIN — METOPROLOL SUCCINATE 5 MILLIGRAM(S): 50 TABLET, EXTENDED RELEASE ORAL at 06:00

## 2025-02-19 RX ADMIN — METOPROLOL SUCCINATE 5 MILLIGRAM(S): 50 TABLET, EXTENDED RELEASE ORAL at 17:29

## 2025-02-19 NOTE — ADVANCED PRACTICE NURSE CONSULT - ASSESSMENT
Chart reviewed & events noted to date. Patient in bed, denies N/V, noted with flatus/ brown pasty stool in pouch. Encourage OOB and ambulate as able. Discussed plan of care regarding pouching. Aquacel surgical dressing removed today POD#5. Aquacel and comfeel applied to incisional site. Small amount of serosanguinous drainage noted. Complete pouching change performed. Wafer was applied on top of wound dressing. End colostomy LLQ, 7/8", pinkish/red, at skin level & viable. Peristomal skin & mucocutaneous junction intact. Pt observed & assisted w/removal of pouching system using pull and push technique, observed CWOCN cleansing skin with water, patting dry. Repouched w/[place] 1 3/4" firm convex skin barrier, Ostomy paste around base of stoma & drainable pouch. Patient with several questions. All questions answered and concerns addressed. Support & encouragement provided.

## 2025-02-19 NOTE — PROGRESS NOTE ADULT - SUBJECTIVE AND OBJECTIVE BOX
Subjective: Patient seen and examined. No new events except as noted.     SUBJECTIVE/ROS:  nad      MEDICATIONS:  MEDICATIONS  (STANDING):  benzocaine 20% Spray 1 Spray(s) Topical once  dextrose 5% + sodium chloride 0.9%. 1000 milliLiter(s) (100 mL/Hr) IV Continuous <Continuous>  dextrose 50% Injectable 12.5 Gram(s) IV Push once  dextrose 50% Injectable 12.5 Gram(s) IV Push once  enoxaparin Injectable 40 milliGRAM(s) SubCutaneous every 24 hours  HYDROmorphone PCA (1 mG/mL) 30 milliLiter(s) PCA Continuous PCA Continuous  influenza  Vaccine (HIGH DOSE) 0.5 milliLiter(s) IntraMuscular once  metoprolol tartrate Injectable 5 milliGRAM(s) IV Push every 6 hours  pantoprazole  Injectable 40 milliGRAM(s) IV Push every 24 hours  potassium phosphate IVPB 15 milliMole(s) IV Intermittent once      PHYSICAL EXAM:  T(C): 36.7 (02-19-25 @ 08:58), Max: 37 (02-19-25 @ 05:13)  HR: 67 (02-19-25 @ 08:58) (59 - 82)  BP: 129/86 (02-19-25 @ 08:58) (129/86 - 188/82)  RR: 18 (02-19-25 @ 08:58) (18 - 18)  SpO2: 97% (02-19-25 @ 08:58) (95% - 98%)  Wt(kg): --  I&O's Summary    18 Feb 2025 07:01  -  19 Feb 2025 07:00  --------------------------------------------------------  IN: 1700 mL / OUT: 1275 mL / NET: 425 mL    19 Feb 2025 07:01  -  19 Feb 2025 09:06  --------------------------------------------------------  IN: 0 mL / OUT: 0 mL / NET: 0 mL            JVP: Normal  Neck: supple  Lung: clear   CV: S1 S2 , Murmur:  Abd: soft  Ext: No edema  neuro: Awake / alert  Psych: flat affect  Skin: normal``    LABS/DATA:    CARDIAC MARKERS:                                12.8   6.53  )-----------( 274      ( 19 Feb 2025 07:13 )             37.7     02-19    141  |  105  |  11  ----------------------------<  125[H]  3.8   |  22  |  0.82    Ca    8.8      19 Feb 2025 07:11  Phos  2.0     02-19  Mg     1.9     02-19    TPro  5.4[L]  /  Alb  2.8[L]  /  TBili  0.4  /  DBili  x   /  AST  31  /  ALT  9[L]  /  AlkPhos  50  02-19    proBNP:   Lipid Profile:   HgA1c:   TSH:     TELE:  EKG:

## 2025-02-19 NOTE — PROGRESS NOTE ADULT - ASSESSMENT
79M with PMHx rectal cancer s/p neoadjuvant chemo and APR (Dr. Ruiz 2010), throat cancer s/p chemo/radiation, and perforated gastric ulcer s/p Kash patch with falciform (Dr. Akhtar, 2024), presenting with closed loop bowel obstruction. He is now s/p ex-lap and open JOSELUIS on 2/14/25.     Plan:  - DVT ppx: LVX  - LR @ 100  - Pain control: IV Tylenol, Dilaudid PCA   - Diet: sips and chips  - OOB and ambulating as tolerated  - Dispo: pending return of bowel function  - Dressing change on POD5 - today with ostomy change  - PT- NPT      ACS/Trauma Surgery  [id number], [phone]      79M with PMHx rectal cancer s/p neoadjuvant chemo and APR (Dr. Ruiz 2010), throat cancer s/p chemo/radiation, and perforated gastric ulcer s/p Kash patch with falciform (Dr. Akhtar, 2024), presenting with closed loop bowel obstruction. He is now s/p ex-lap and open JOSELUIS on 2/14/25.     Plan:  - CT a/p today  - DVT ppx: LVX  - LR @ 100  - Pain control: IV Tylenol, Dilaudid PCA   - Diet: sips and chips  - TPN was consulted as patient has not eaten for over 7 days and very malnourished prior to surgery. Patient and family refusing TPN at this time and wanting to wait.  - OOB and ambulating as tolerated  - Dispo: pending return of bowel function  - Dressing change on POD5 - today with ostomy change  - PT- NPT      ACS/Trauma Surgery  [id number], [phone]

## 2025-02-19 NOTE — PROGRESS NOTE ADULT - SUBJECTIVE AND OBJECTIVE BOX
[facility] NUTRITION SUPPORT-- FOLLOW UP NOTE      24 hour events/subjective:   Patient seen and examined at bedside, chart reviewed and events noted and I's and O's reviewed.  Patient denies chest pain, shortness of breath, nausea or vomiting, dizziness, chills at time of visit.  Patient continues on PCA for pain control, phos repletion noted.  No leucocytosis and no new fingersticks documented.  Patient's VSS except elevated SBP and no other acute overnight events noted.   Patient remains NPO however planned for possible diet advancement post CT scan.       ROS:  Except as noted above, all other systems reviewed and are negative     Diet:  Diet, NPO:   Except Medications  With Ice Chips/Sips of Water     Special Instructions for Nursing:  Except Medications (02-18-25 @ 11:46)      PAST HISTORY  --------------------------------------------------------------------------------  PAST MEDICAL & SURGICAL HISTORY:  Rectal Neoplasm  treated with oral chemothearpy and radiation 5/10- 6/10        Hemorrhoid      Malignant neoplasm of oropharynx      Colostomy status      Neck mass      S/P Colonoscopy  3/10      Rectal cancer      S/P colostomy      History of cancer chemotherapy      S/P radiation therapy        No significant changes to PMH, PSH, FHx, SHx, unless otherwise noted    ALLERGIES & MEDICATIONS  --------------------------------------------------------------------------------  Allergies    No Known Allergies    Intolerances      Standing Inpatient Medications  benzocaine 20% Spray 1 Spray(s) Topical once  dextrose 5% + sodium chloride 0.9%. 1000 milliLiter(s) IV Continuous <Continuous>  dextrose 50% Injectable 12.5 Gram(s) IV Push once  dextrose 50% Injectable 12.5 Gram(s) IV Push once  enoxaparin Injectable 40 milliGRAM(s) SubCutaneous every 24 hours  HYDROmorphone PCA (1 mG/mL) 30 milliLiter(s) PCA Continuous PCA Continuous  influenza  Vaccine (HIGH DOSE) 0.5 milliLiter(s) IntraMuscular once  metoprolol tartrate Injectable 5 milliGRAM(s) IV Push every 6 hours  pantoprazole  Injectable 40 milliGRAM(s) IV Push every 24 hours    PRN Inpatient Medications  HYDROmorphone PCA (1 mG/mL) Rescue Clinician Bolus 0.5 milliGRAM(s) IV Push every 15 minutes PRN  nalbuphine Injectable 2.5 milliGRAM(s) IV Push every 6 hours PRN  naloxone Injectable 0.1 milliGRAM(s) IV Push every 3 minutes PRN  ondansetron Injectable 4 milliGRAM(s) IV Push every 6 hours PRN        VITALS/PHYSICAL EXAM  --------------------------------------------------------------------------------  T(C): 36.9 (02-19-25 @ 13:03), Max: 37 (02-19-25 @ 05:13)  HR: 74 (02-19-25 @ 13:03) (59 - 82)  BP: 134/74 (02-19-25 @ 13:03) (129/86 - 188/82)  RR: 18 (02-19-25 @ 13:03) (18 - 18)  SpO2: 96% (02-19-25 @ 13:03) (95% - 98%)  Wt(kg): --      02-18-25 @ 07:01  -  02-19-25 @ 07:00  --------------------------------------------------------  IN: 1700 mL / OUT: 1275 mL / NET: 425 mL    02-19-25 @ 07:01  -  02-19-25 @ 13:26  --------------------------------------------------------  IN: 0 mL / OUT: 0 mL / NET: 0 mL      I&O's Detail    18 Feb 2025 07:01  -  19 Feb 2025 07:00  --------------------------------------------------------  IN:    dextrose 5% + sodium chloride 0.9%: 1200 mL    Oral Fluid: 500 mL  Total IN: 1700 mL    OUT:    Colostomy (mL): 0 mL    Voided (mL): 1275 mL  Total OUT: 1275 mL    Total NET: 425 mL      19 Feb 2025 07:01  -  19 Feb 2025 13:26  --------------------------------------------------------  IN:  Total IN: 0 mL    OUT:    Oral Fluid: 0 mL  Total OUT: 0 mL    Total NET: 0 mL          Physical Exam:  Gen: NAD, laying in bed   HEENT:  supple neck, no JVD  Chest: non labored breathing, equal chest expansion bilaterally  Abd: Soft and nontender. No ostomy output. Ostomy pink and viable.   : No suprapubic tenderness  Ext: Warm, FROM, no edema b/l LE  Neuro: No focal deficits  Psych: Normal affect and mood  Skin: Warm, without rashes       LABS/STUDIES  --------------------------------------------------------------------------------              12.8   6.53  >-----------<  274      [02-19-25 @ 07:13]              37.7     141  |  105  |  11  ----------------------------<  125      [02-19-25 @ 07:11]  3.8   |  22  |  0.82        Ca     8.8     [02-19-25 @ 07:11]      iCa    1.20     [02-19 @ 07:11]      Mg     1.9     [02-19-25 @ 07:11]      Phos  2.0     [02-19-25 @ 07:11]    TPro  5.4  /  Alb  2.8  /  TBili  0.4  /  DBili  x   /  AST  31  /  ALT  9   /  AlkPhos  50  [02-19-25 @ 07:11]          Ca ionized  Creatinine Trend:  POC glucoseGlucose: 125 mg/dL (02-19-25 @ 07:11)  CAPILLARY BLOOD GLUCOSE      POCT Blood Glucose.: 76 mg/dL (18 Feb 2025 13:36)    PrealbuminPrealbumin, Serum: 8 mg/dL (02-19-25 @ 07:14)    Triglycerides

## 2025-02-19 NOTE — PROGRESS NOTE ADULT - ASSESSMENT
79 year old man with PMHx rectal cancer s/p neoadjuvant chemo and APR (Dr. Ruiz 2010), throat cancer s/p chemo/radiation, and perforated gastric ulcer s/p Kash patch with falciform (Dr. Akhtar, 2024) admitted on 2/14/25 for closed loop SBO .  Patient underwent ex lap with open JOSELUIS on same day and post op course c/b ileus.  Patient had NGT placed (removed 2/17) and still with no output from ostomy.  TPN team consulted given insufficient enteral intake.  Patient with history of TPN 8/24-11/2024.     GOAL PN: 120g amino acids, 210 g dextrose,65 g SMOF lipid; to provide 1844 kcal/day (29 kcal/kg and 1.9 g/kg protein based on dosing wt 63.5 kg)  Non-Protein Calories: 1364 kcal/day (21 kcal/kg, based on dosing wt 63.5 kg); Dextrose Infusion Rate: 2.3 mg/kg/min (24-hour infusion); Lipid Infusion Rate: 0.98 g/kg; 0.08 g/kg/hr (12-hour infusion)      - Nutritional Assessment, patient with severe protein calorie malnutrition not meeting nutritional goals enterally due to ileus/NPO status and would likely benefit from TPN for nutritional support; prealbumin only 8 mg/dL on 2/19 - trend weekly   - TPN plan:  Discussed TPN initiation with pros/cons; patient would like to get results of CT a/p and hold off on TPN today  - TPN access:  Patient will need a dedicated central line if/once TPN approved  - Anemia:  check iron studies, B12, Folate levels  - Electrolyte Imbalance risk:  recommend to check CMP, Mg, Phos and ionized Ca daily, to be supplemented peripherally per primary team.  - Ileus:  monitor ostomy output overnight, CT a/p, f/u surgical plan and nutrition parameters in AM  - IV fluids per primary team; IV fluids per primary team; can add MVI and Thiamine IV to help decrease risk of refeeding syndrome once nutrition, whether TPN vs enteral, resumes   - Recommend strict intake and output/weight checks three times a week  - Hypoglycemia:  HgA1c 5.1%; finger sticks every 6 hours to monitor glucose trend  - Risk for Hypertriglyceridemia with TPN:  plan to check baseline lipid profile in AM and monitor TG closely once/if TPN initiated   - Global care per primary team    Available on TEAMS  TPN spectra [id number] ([phone] when dialing from outside line)  M-F 8A-2P, Weekends and holidays 8/9A-12/1P  Discussed with Dr. Gerardo Silverio    Time-based billing (NON-critical care).     35 minutes spent on total encounter. The necessity of the time spent during the encounter on this date of service was due to chart review, lab/radiology test review, patient assessment, discussion and collaboration with interdisciplinary team members and ordering TPN/making recommendation.

## 2025-02-19 NOTE — PROGRESS NOTE ADULT - SUBJECTIVE AND OBJECTIVE BOX
SURGERY DAILY PROGRESS NOTE:     Overnight Events:  No acute events overnight.    SUBJECTIVE: Patient seen and evaluated on AM rounds. Pt is resting comfortably in bed with no complaints.     OBJECTIVE:  Vital Signs Last 24 Hrs  T(C): 37 (19 Feb 2025 05:13), Max: 37 (19 Feb 2025 05:13)  T(F): 98.6 (19 Feb 2025 05:13), Max: 98.6 (19 Feb 2025 05:13)  HR: 82 (19 Feb 2025 05:13) (59 - 82)  BP: 152/87 (19 Feb 2025 05:13) (134/84 - 188/82)  BP(mean): --  RR: 18 (19 Feb 2025 05:13) (18 - 18)  SpO2: 98% (19 Feb 2025 05:13) (95% - 98%)    Parameters below as of 19 Feb 2025 05:13  Patient On (Oxygen Delivery Method): room air      I&O's Detail    18 Feb 2025 07:01  -  19 Feb 2025 07:00  --------------------------------------------------------  IN:    dextrose 5% + sodium chloride 0.9%: 1200 mL    Oral Fluid: 500 mL  Total IN: 1700 mL    OUT:    Colostomy (mL): 0 mL    Voided (mL): 1275 mL  Total OUT: 1275 mL    Total NET: 425 mL        Daily     Daily     LABS:                        12.8   6.53  )-----------( 274      ( 19 Feb 2025 07:13 )             37.7     02-18    139  |  102  |  14  ----------------------------<  42[LL]  3.9   |  22  |  0.85    Ca    9.2      18 Feb 2025 08:34  Phos  2.6     02-18  Mg     1.9     02-18        Urinalysis Basic - ( 18 Feb 2025 08:34 )    Color: x / Appearance: x / SG: x / pH: x  Gluc: 42 mg/dL / Ketone: x  / Bili: x / Urobili: x   Blood: x / Protein: x / Nitrite: x   Leuk Esterase: x / RBC: x / WBC x   Sq Epi: x / Non Sq Epi: x / Bacteria: x        Physical Exam  Constitutional: NAD  Respiratory: Breathing comfortably on RA  Gastrointestinal: Soft and nontender. No ostomy output. Ostomy pink and viable.   Extremities: Moving all extremities, no edema, warm and well-perfused  Psychiatric:  A&Ox3, appropriate affect

## 2025-02-19 NOTE — PROGRESS NOTE ADULT - SUBJECTIVE AND OBJECTIVE BOX
Day _5 of Anesthesia Pain Management Service    SUBJECTIVE: Patient is doing well with IV PCA    Pain Scale Score:	[X] Refer to charted pain scores    THERAPY:    [ ] IV PCA Morphine		[ ] 5 mg/mL	[ ] 1 mg/mL  [X] IV PCA Hydromorphone	[ ] 5 mg/mL	[X] 1 mg/mL  [ ] IV PCA Fentanyl		[ ] 50 micrograms/mL    Demand dose: 0.2 mg     Lockout: 6 minutes   Continuous Rate: 0 mg/hr  4 Hour Limit: 4 mg    MEDICATIONS  (STANDING):  benzocaine 20% Spray 1 Spray(s) Topical once  dextrose 5% + sodium chloride 0.9%. 1000 milliLiter(s) (100 mL/Hr) IV Continuous <Continuous>  dextrose 50% Injectable 12.5 Gram(s) IV Push once  dextrose 50% Injectable 12.5 Gram(s) IV Push once  enoxaparin Injectable 40 milliGRAM(s) SubCutaneous every 24 hours  HYDROmorphone PCA (1 mG/mL) 30 milliLiter(s) PCA Continuous PCA Continuous  influenza  Vaccine (HIGH DOSE) 0.5 milliLiter(s) IntraMuscular once  metoprolol tartrate Injectable 5 milliGRAM(s) IV Push every 6 hours  pantoprazole  Injectable 40 milliGRAM(s) IV Push every 24 hours    MEDICATIONS  (PRN):  HYDROmorphone PCA (1 mG/mL) Rescue Clinician Bolus 0.5 milliGRAM(s) IV Push every 15 minutes PRN for Pain Scale GREATER THAN 6  nalbuphine Injectable 2.5 milliGRAM(s) IV Push every 6 hours PRN Pruritus  naloxone Injectable 0.1 milliGRAM(s) IV Push every 3 minutes PRN For ANY of the following changes in patient status:  A. RR LESS THAN 10 breaths per minute, B. Oxygen saturation LESS THAN 90%, C. Sedation score of 6  ondansetron Injectable 4 milliGRAM(s) IV Push every 6 hours PRN Nausea      OBJECTIVE:    Sedation Score:	[ X] Alert	[ ] Drowsy 	[ ] Arousable	[ ] Asleep	[ ] Unresponsive    Side Effects:	[X ] None	[ ] Nausea	[ ] Vomiting	[ ] Pruritus  		[ ] Other:    Vital Signs Last 24 Hrs  T(C): 36.9 (19 Feb 2025 13:03), Max: 37 (19 Feb 2025 05:13)  T(F): 98.5 (19 Feb 2025 13:03), Max: 98.6 (19 Feb 2025 05:13)  HR: 74 (19 Feb 2025 13:03) (60 - 82)  BP: 134/74 (19 Feb 2025 13:03) (129/86 - 164/86)  BP(mean): --  RR: 18 (19 Feb 2025 13:03) (18 - 18)  SpO2: 96% (19 Feb 2025 13:03) (95% - 98%)    Parameters below as of 19 Feb 2025 13:03  Patient On (Oxygen Delivery Method): room air        ASSESSMENT/ PLAN    Therapy to  be:               [X] Continued   [ ] Discontinued   [ ] Changed to PRN Analgesics    Documentation and Verification of current medications:   [X] Done	[ ] Not done, not eligible    Comments:

## 2025-02-20 LAB
ANION GAP SERPL CALC-SCNC: 8 MMOL/L — SIGNIFICANT CHANGE UP (ref 5–17)
APTT BLD: 42.4 SEC — HIGH (ref 24.5–35.6)
BLD GP AB SCN SERPL QL: NEGATIVE — SIGNIFICANT CHANGE UP
BUN SERPL-MCNC: 10 MG/DL — SIGNIFICANT CHANGE UP (ref 7–23)
CALCIUM SERPL-MCNC: 8.9 MG/DL — SIGNIFICANT CHANGE UP (ref 8.4–10.5)
CHLORIDE SERPL-SCNC: 106 MMOL/L — SIGNIFICANT CHANGE UP (ref 96–108)
CO2 SERPL-SCNC: 27 MMOL/L — SIGNIFICANT CHANGE UP (ref 22–31)
CREAT SERPL-MCNC: 0.88 MG/DL — SIGNIFICANT CHANGE UP (ref 0.5–1.3)
EGFR: 89 ML/MIN/1.73M2 — SIGNIFICANT CHANGE UP
GLUCOSE BLDC GLUCOMTR-MCNC: 114 MG/DL — HIGH (ref 70–99)
GLUCOSE SERPL-MCNC: 108 MG/DL — HIGH (ref 70–99)
HCT VFR BLD CALC: 37.2 % — LOW (ref 39–50)
HGB BLD-MCNC: 12.3 G/DL — LOW (ref 13–17)
INR BLD: 2.03 RATIO — HIGH (ref 0.85–1.16)
MAGNESIUM SERPL-MCNC: 1.9 MG/DL — SIGNIFICANT CHANGE UP (ref 1.6–2.6)
MCHC RBC-ENTMCNC: 30.8 PG — SIGNIFICANT CHANGE UP (ref 27–34)
MCHC RBC-ENTMCNC: 33.1 G/DL — SIGNIFICANT CHANGE UP (ref 32–36)
MCV RBC AUTO: 93.2 FL — SIGNIFICANT CHANGE UP (ref 80–100)
NRBC BLD AUTO-RTO: 0 /100 WBCS — SIGNIFICANT CHANGE UP (ref 0–0)
PHOSPHATE SERPL-MCNC: 2.4 MG/DL — LOW (ref 2.5–4.5)
PLATELET # BLD AUTO: 278 K/UL — SIGNIFICANT CHANGE UP (ref 150–400)
POTASSIUM SERPL-MCNC: 4.3 MMOL/L — SIGNIFICANT CHANGE UP (ref 3.5–5.3)
POTASSIUM SERPL-SCNC: 4.3 MMOL/L — SIGNIFICANT CHANGE UP (ref 3.5–5.3)
PROTHROM AB SERPL-ACNC: 23.2 SEC — HIGH (ref 9.9–13.4)
RBC # BLD: 3.99 M/UL — LOW (ref 4.2–5.8)
RBC # FLD: 14.2 % — SIGNIFICANT CHANGE UP (ref 10.3–14.5)
RH IG SCN BLD-IMP: POSITIVE — SIGNIFICANT CHANGE UP
SODIUM SERPL-SCNC: 141 MMOL/L — SIGNIFICANT CHANGE UP (ref 135–145)
WBC # BLD: 4.57 K/UL — SIGNIFICANT CHANGE UP (ref 3.8–10.5)
WBC # FLD AUTO: 4.57 K/UL — SIGNIFICANT CHANGE UP (ref 3.8–10.5)

## 2025-02-20 PROCEDURE — 74018 RADEX ABDOMEN 1 VIEW: CPT | Mod: 26

## 2025-02-20 PROCEDURE — 99232 SBSQ HOSP IP/OBS MODERATE 35: CPT

## 2025-02-20 RX ORDER — HYDROMORPHONE/SOD CHLOR,ISO/PF 2 MG/10 ML
0.5 SYRINGE (ML) INJECTION EVERY 4 HOURS
Refills: 0 | Status: DISCONTINUED | OUTPATIENT
Start: 2025-02-20 | End: 2025-02-21

## 2025-02-20 RX ORDER — ACETAMINOPHEN 500 MG/5ML
1000 LIQUID (ML) ORAL EVERY 6 HOURS
Refills: 0 | Status: COMPLETED | OUTPATIENT
Start: 2025-02-20 | End: 2025-02-21

## 2025-02-20 RX ORDER — POTASSIUM PHOSPHATE, MONOBASIC POTASSIUM PHOSPHATE, DIBASIC INJECTION, 236; 224 MG/ML; MG/ML
15 SOLUTION, CONCENTRATE INTRAVENOUS ONCE
Refills: 0 | Status: COMPLETED | OUTPATIENT
Start: 2025-02-20 | End: 2025-02-20

## 2025-02-20 RX ORDER — NALOXEGOL OXALATE 12.5 MG/1
25 TABLET, FILM COATED ORAL DAILY
Refills: 0 | Status: DISCONTINUED | OUTPATIENT
Start: 2025-02-20 | End: 2025-02-23

## 2025-02-20 RX ORDER — HYDROMORPHONE/SOD CHLOR,ISO/PF 2 MG/10 ML
0.2 SYRINGE (ML) INJECTION EVERY 4 HOURS
Refills: 0 | Status: DISCONTINUED | OUTPATIENT
Start: 2025-02-20 | End: 2025-02-21

## 2025-02-20 RX ADMIN — NALOXEGOL OXALATE 25 MILLIGRAM(S): 12.5 TABLET, FILM COATED ORAL at 21:26

## 2025-02-20 RX ADMIN — METOPROLOL SUCCINATE 5 MILLIGRAM(S): 50 TABLET, EXTENDED RELEASE ORAL at 00:57

## 2025-02-20 RX ADMIN — Medication 400 MILLIGRAM(S): at 18:19

## 2025-02-20 RX ADMIN — Medication 30 MILLILITER(S): at 07:23

## 2025-02-20 RX ADMIN — METOPROLOL SUCCINATE 5 MILLIGRAM(S): 50 TABLET, EXTENDED RELEASE ORAL at 13:34

## 2025-02-20 RX ADMIN — METOPROLOL SUCCINATE 5 MILLIGRAM(S): 50 TABLET, EXTENDED RELEASE ORAL at 05:52

## 2025-02-20 RX ADMIN — Medication 1000 MILLIGRAM(S): at 23:45

## 2025-02-20 RX ADMIN — METOPROLOL SUCCINATE 5 MILLIGRAM(S): 50 TABLET, EXTENDED RELEASE ORAL at 18:19

## 2025-02-20 RX ADMIN — Medication 400 MILLIGRAM(S): at 23:08

## 2025-02-20 RX ADMIN — ENOXAPARIN SODIUM 40 MILLIGRAM(S): 100 INJECTION SUBCUTANEOUS at 05:52

## 2025-02-20 RX ADMIN — POTASSIUM PHOSPHATE, MONOBASIC POTASSIUM PHOSPHATE, DIBASIC INJECTION, 62.5 MILLIMOLE(S): 236; 224 SOLUTION, CONCENTRATE INTRAVENOUS at 13:33

## 2025-02-20 RX ADMIN — Medication 40 MILLIGRAM(S): at 05:52

## 2025-02-20 NOTE — PROGRESS NOTE ADULT - SUBJECTIVE AND OBJECTIVE BOX
[facility] NUTRITION SUPPORT-- FOLLOW UP NOTE      24 hour events/subjective:   Patient seen and examined at bedside, chart reviewed and events noted and I's and O's reviewed.  Patient denies chest pain, shortness of breath, nausea or vomiting, dizziness, chills at time of visit.  Phos repletion noted; patient's CT a/p noted, remains NPO.  Patient's VSS except elevated SBP and no other acute overnight events noted.       Diet:  Diet, NPO:   Except Medications  With Ice Chips/Sips of Water (02-20-25 @ 13:24)      PAST HISTORY  --------------------------------------------------------------------------------  PAST MEDICAL & SURGICAL HISTORY:  Rectal Neoplasm  treated with oral chemothearpy and radiation 5/10- 6/10        Hemorrhoid      Malignant neoplasm of oropharynx      Colostomy status      Neck mass      S/P Colonoscopy  3/10      Rectal cancer      S/P colostomy      History of cancer chemotherapy      S/P radiation therapy        No significant changes to PMH, PSH, FHx, SHx, unless otherwise noted    ALLERGIES & MEDICATIONS  --------------------------------------------------------------------------------  Allergies    No Known Allergies    Intolerances      Standing Inpatient Medications  acetaminophen   IVPB .. 1000 milliGRAM(s) IV Intermittent every 6 hours  benzocaine 20% Spray 1 Spray(s) Topical once  dextrose 5% + sodium chloride 0.9%. 1000 milliLiter(s) IV Continuous <Continuous>  dextrose 50% Injectable 12.5 Gram(s) IV Push once  dextrose 50% Injectable 12.5 Gram(s) IV Push once  enoxaparin Injectable 40 milliGRAM(s) SubCutaneous every 24 hours  influenza  Vaccine (HIGH DOSE) 0.5 milliLiter(s) IntraMuscular once  metoprolol tartrate Injectable 5 milliGRAM(s) IV Push every 6 hours  pantoprazole  Injectable 40 milliGRAM(s) IV Push every 24 hours  potassium phosphate IVPB 15 milliMole(s) IV Intermittent once    PRN Inpatient Medications  HYDROmorphone  Injectable 0.2 milliGRAM(s) IV Push every 4 hours PRN  HYDROmorphone  Injectable 0.5 milliGRAM(s) IV Push every 4 hours PRN  nalbuphine Injectable 2.5 milliGRAM(s) IV Push every 6 hours PRN  naloxone Injectable 0.1 milliGRAM(s) IV Push every 3 minutes PRN  ondansetron Injectable 4 milliGRAM(s) IV Push every 6 hours PRN        VITALS/PHYSICAL EXAM  --------------------------------------------------------------------------------  T(C): 36.5 (02-20-25 @ 12:32), Max: 36.9 (02-19-25 @ 17:08)  HR: 84 (02-20-25 @ 12:32) (51 - 84)  BP: 176/84 (02-20-25 @ 12:32) (113/60 - 176/84)  RR: 18 (02-20-25 @ 12:32) (18 - 18)  SpO2: 95% (02-20-25 @ 12:32) (94% - 98%)  Wt(kg): --      02-19-25 @ 07:01  -  02-20-25 @ 07:00  --------------------------------------------------------  IN: 2400 mL / OUT: 925 mL / NET: 1475 mL    02-20-25 @ 07:01  -  02-20-25 @ 13:34  --------------------------------------------------------  IN: 0 mL / OUT: 0 mL / NET: 0 mL      I&O's Detail    19 Feb 2025 07:01  -  20 Feb 2025 07:00  --------------------------------------------------------  IN:    dextrose 5% + sodium chloride 0.9%: 2400 mL  Total IN: 2400 mL    OUT:    Colostomy (mL): 0 mL    Oral Fluid: 0 mL    Voided (mL): 925 mL  Total OUT: 925 mL    Total NET: 1475 mL      20 Feb 2025 07:01  -  20 Feb 2025 13:34  --------------------------------------------------------  IN:  Total IN: 0 mL    OUT:    Oral Fluid: 0 mL  Total OUT: 0 mL    Total NET: 0 mL          Physical Exam:  Gen: NAD, laying in bed   HEENT:  supple neck, no JVD  Chest: non labored breathing, equal chest expansion bilaterally  Abd: Soft and nontender. No ostomy output. Ostomy pink and viable.   : No suprapubic tenderness  Ext: Warm, FROM, no edema b/l LE  Neuro: No focal deficits  Psych: Normal affect and mood  Skin: Warm, without rashes           LABS/STUDIES  --------------------------------------------------------------------------------              12.3   4.57  >-----------<  278      [02-20-25 @ 06:03]              37.2     141  |  106  |  10  ----------------------------<  108      [02-20-25 @ 06:03]  4.3   |  27  |  0.88        Ca     8.9     [02-20-25 @ 06:03]      iCa    1.20     [02-19 @ 07:11]      Mg     1.9     [02-20-25 @ 06:03]      Phos  2.4     [02-20-25 @ 06:03]    TPro  5.4  /  Alb  2.8  /  TBili  0.4  /  DBili  x   /  AST  31  /  ALT  9   /  AlkPhos  50  [02-19-25 @ 07:11]    PT/INR: PT 23.2 , INR 2.03       [02-20-25 @ 06:03]  PTT: 42.4       [02-20-25 @ 06:03]      Ca ionized  Creatinine Trend:  POC glucoseGlucose: 108 mg/dL (02-20-25 @ 06:03)  CAPILLARY BLOOD GLUCOSE        PrealbuminPrealbumin, Serum: 8 mg/dL (02-19-25 @ 07:14)    Triglycerides

## 2025-02-20 NOTE — ADVANCED PRACTICE NURSE CONSULT - REASON FOR CONSULT
Digitalize stoma.      s/p CT Abdomen and Pelvis w/ Oral Cont and w/ IV Cont (02.19.25 @ 12:54)   IMPRESSION:  *  Dilated small bowel with short segment of tapering to underdistention   in the pelvis, suspicious for small bowel obstruction. The small bowel   distal to this area is mostly decompressed.  *  Moderate volume of pneumoperitoneum. Small to moderate volume of free   peritoneal fluid. Loculated appearing collection of fluid in the anterior   abdomen that is subjacent to the midline ventral abdominal wall measuring   4.9 x 1.8 cm. Suspect an additional loculated pocket of fluid in the   right pelvis with adjacent peritoneal thickening versus a loop of small   bowel that measures 6.3 x 2.6 cm. Peritonitis or developing abscesses are   considered.  *  Mild urinary bladder wall thickening, which may be secondary to   underdistention, chronic bladder outlet obstruction, or cystitis.  *  Small bilateral pleural effusions.     To digitalize stoma.     s/p CT Abdomen and Pelvis w/ Oral Cont and w/ IV Cont (02.19.25 @ 12:54)   IMPRESSION:  *  Dilated small bowel with short segment of tapering to underdistention   in the pelvis, suspicious for small bowel obstruction. The small bowel   distal to this area is mostly decompressed.  *  Moderate volume of pneumoperitoneum. Small to moderate volume of free   peritoneal fluid. Loculated appearing collection of fluid in the anterior   abdomen that is subjacent to the midline ventral abdominal wall measuring   4.9 x 1.8 cm. Suspect an additional loculated pocket of fluid in the   right pelvis with adjacent peritoneal thickening versus a loop of small   bowel that measures 6.3 x 2.6 cm. Peritonitis or developing abscesses are   considered.  *  Mild urinary bladder wall thickening, which may be secondary to   underdistention, chronic bladder outlet obstruction, or cystitis.  *  Small bilateral pleural effusions.

## 2025-02-20 NOTE — ADVANCED PRACTICE NURSE CONSULT - ASSESSMENT
In at bedside to f/u consult request to digitalize stoma, with colleague & CANDE Flores. Per PA, CT abd /pelvis showed SBO vs ileus, likely ileus & there was some stool by stoma. On assessment, pt in bed awake A&O x 3'feeling okay", no c/o discomfort. Spouse at bedside.Pouching system intact. Stoma pink& viable, a piece of hard yellow -brown stool noted in pouch, no gas noted. motional support provided. Explained purpose & procedure of stomal digitalization. Pt agreed, spouse stepped -out of the room. Pouch removed, & stoma cleansed. Gloved lubricated finger (5th digit/"pinky") inserted into stoma os w/o difficulty, no tension noted. No stool felt during digitalization, nor fecal staining noted in gloved 5th digit finger. Pt tolerated procedure well, no c/o voiced. Pt reports having stoma function the day before, asked if he could now eat. Pt is currently NPO, excepts ice chips & sips of water. Emotional support provided. In at bedside to f/u consult request to digitalize stoma, with colleague & CANDE Flores. Per PA, CT abd /pelvis showed SBO vs ileus, likely ileus & there was some stool by stoma. On assessment, pt in bed awake A&O x 3'feeling okay", no c/o discomfort. Spouse at bedside.Pouching system intact. Stoma pink& viable, a piece of hard yellow -brown stool noted in pouch, no gas noted. Emotional support provided. Explained purpose & procedure of stomal digitalization. Pt agreed, spouse stepped -out of the room. Pouch removed, & stoma cleansed. Gloved lubricated finger (5th digit/"pinky") inserted into stoma os w/o difficulty, no tension noted. No stool felt during digitalization, nor fecal staining noted upon removal of gloved 5th digit finger. Pt tolerated procedure well, no c/o voiced. Pt reports having stoma function the day before, asked if he could now eat. Pt is currently NPO, excepts ice chips & sips of water. Emotional support provided.

## 2025-02-20 NOTE — PROGRESS NOTE ADULT - ASSESSMENT
79M with PMHx rectal cancer s/p neoadjuvant chemo and APR (Dr. Ruiz 2010), throat cancer s/p chemo/radiation, and perforated gastric ulcer s/p Kash patch with falciform (Dr. Akhtar, 2024), presenting with closed loop bowel obstruction. He is now s/p ex-lap and open JOSELUIS on 2/14/25.     Plan:  - AM AXR shows contrast in colon  - Digitize stoma  - Hold off on IR consult for collections as they are small and pt afebrile without leukocytosis  - DVT ppx: LVX  - LR @ 100  - Pain control: IV Tylenol, Dilaudid PCA   - Diet: sips and chips  - TPN was consulted as patient has not eaten for over 7 days and very malnourished prior to surgery. Patient and family refusing TPN at this time and wanting to wait.  - OOB and ambulating as tolerated  - Dispo: pending return of bowel function  - Dressing change on POD5 - today with ostomy change  - PT- NPT      ACS/Trauma Surgery  [id number], [phone]

## 2025-02-20 NOTE — PROGRESS NOTE ADULT - SUBJECTIVE AND OBJECTIVE BOX
Day 6 of Anesthesia Pain Management Service    SUBJECTIVE: Patient is doing well with IV PCA    Pain Scale Score:	[X] Refer to charted pain scores    THERAPY:    [ ] IV PCA Morphine		[ ] 5 mg/mL	[ ] 1 mg/mL  [X] IV PCA Hydromorphone	[ ] 5 mg/mL	[X] 1 mg/mL  [ ] IV PCA Fentanyl		[ ] 50 micrograms/mL    Demand dose: 0.2 mg     Lockout: 6 minutes   Continuous Rate: 0 mg/hr  4 Hour Limit: 4 mg    MEDICATIONS  (STANDING):  benzocaine 20% Spray 1 Spray(s) Topical once  dextrose 5% + sodium chloride 0.9%. 1000 milliLiter(s) (100 mL/Hr) IV Continuous <Continuous>  dextrose 50% Injectable 12.5 Gram(s) IV Push once  dextrose 50% Injectable 12.5 Gram(s) IV Push once  enoxaparin Injectable 40 milliGRAM(s) SubCutaneous every 24 hours  HYDROmorphone PCA (1 mG/mL) 30 milliLiter(s) PCA Continuous PCA Continuous  influenza  Vaccine (HIGH DOSE) 0.5 milliLiter(s) IntraMuscular once  metoprolol tartrate Injectable 5 milliGRAM(s) IV Push every 6 hours  pantoprazole  Injectable 40 milliGRAM(s) IV Push every 24 hours  potassium phosphate IVPB 15 milliMole(s) IV Intermittent once    MEDICATIONS  (PRN):  HYDROmorphone PCA (1 mG/mL) Rescue Clinician Bolus 0.5 milliGRAM(s) IV Push every 15 minutes PRN for Pain Scale GREATER THAN 6  nalbuphine Injectable 2.5 milliGRAM(s) IV Push every 6 hours PRN Pruritus  naloxone Injectable 0.1 milliGRAM(s) IV Push every 3 minutes PRN For ANY of the following changes in patient status:  A. RR LESS THAN 10 breaths per minute, B. Oxygen saturation LESS THAN 90%, C. Sedation score of 6  ondansetron Injectable 4 milliGRAM(s) IV Push every 6 hours PRN Nausea      OBJECTIVE:    Sedation Score:	[ X] Alert	[ ] Drowsy 	[ ] Arousable	[ ] Asleep	[ ] Unresponsive    Side Effects:	[X ] None	[ ] Nausea	[ ] Vomiting	[ ] Pruritus  		[ ] Other:    Vital Signs Last 24 Hrs  T(C): 36.4 (20 Feb 2025 08:25), Max: 36.9 (19 Feb 2025 13:03)  T(F): 97.6 (20 Feb 2025 08:25), Max: 98.5 (19 Feb 2025 13:03)  HR: 51 (20 Feb 2025 08:25) (51 - 74)  BP: 129/81 (20 Feb 2025 08:25) (113/60 - 152/75)  BP(mean): --  RR: 18 (20 Feb 2025 08:25) (18 - 18)  SpO2: 98% (20 Feb 2025 08:25) (94% - 98%)    Parameters below as of 20 Feb 2025 08:25  Patient On (Oxygen Delivery Method): room air        ASSESSMENT/ PLAN    Therapy to  be:               [X] Continued   [ ] Discontinued   [ ] Changed to PRN Analgesics    Documentation and Verification of current medications:   [X] Done	[ ] Not done, not eligible    Comments:

## 2025-02-20 NOTE — PROGRESS NOTE ADULT - SUBJECTIVE AND OBJECTIVE BOX
SURGERY DAILY PROGRESS NOTE        SUBJECTIVE: Pt seen and examined at bedside. Admits to _____. Denies chest pain, SOB, palpitations, HA, fever, chills, N/V.      OBJECTIVE:  Vital Signs Last 24 Hrs  T(C): 36.4 (20 Feb 2025 08:25), Max: 36.9 (19 Feb 2025 13:03)  T(F): 97.6 (20 Feb 2025 08:25), Max: 98.5 (19 Feb 2025 13:03)  HR: 51 (20 Feb 2025 08:25) (51 - 74)  BP: 129/81 (20 Feb 2025 08:25) (113/60 - 152/75)  BP(mean): --  RR: 18 (20 Feb 2025 08:25) (18 - 18)  SpO2: 98% (20 Feb 2025 08:25) (94% - 98%)    Parameters below as of 20 Feb 2025 08:25  Patient On (Oxygen Delivery Method): room air        I&O's Summary    19 Feb 2025 07:01  -  20 Feb 2025 07:00  --------------------------------------------------------  IN: 2400 mL / OUT: 925 mL / NET: 1475 mL          Physical Exam  Constitutional: NAD  Respiratory: Breathing comfortably on RA  Gastrointestinal: Soft and nontender. Ostomy output one large piece of formed stool, stoma pink and viable.   Extremities: Moving all extremities, no edema, warm and well-perfused  Psychiatric:  A&Ox3, appropriate affect    LABS:                        12.3   4.57  )-----------( 278      ( 20 Feb 2025 06:03 )             37.2     02-20    141  |  106  |  10  ----------------------------<  108[H]  4.3   |  27  |  0.88    Ca    8.9      20 Feb 2025 06:03  Phos  2.4     02-20  Mg     1.9     02-20    TPro  5.4[L]  /  Alb  2.8[L]  /  TBili  0.4  /  DBili  x   /  AST  31  /  ALT  9[L]  /  AlkPhos  50  02-19    PT/INR - ( 20 Feb 2025 06:03 )   PT: 23.2 sec;   INR: 2.03 ratio         PTT - ( 20 Feb 2025 06:03 )  PTT:42.4 sec  Urinalysis Basic - ( 20 Feb 2025 06:03 )    Color: x / Appearance: x / SG: x / pH: x  Gluc: 108 mg/dL / Ketone: x  / Bili: x / Urobili: x   Blood: x / Protein: x / Nitrite: x   Leuk Esterase: x / RBC: x / WBC x   Sq Epi: x / Non Sq Epi: x / Bacteria: x        RADIOLOGY & ADDITIONAL STUDIES:    < from: CT Abdomen and Pelvis w/ Oral Cont and w/ IV Cont (02.19.25 @ 12:54) >  IMPRESSION:  *  Dilated small bowel with short segment of tapering to underdistention   in the pelvis, suspicious for small bowel obstruction. The small bowel   distal to this area is mostly decompressed.  *  Moderate volume of pneumoperitoneum. Small to moderate volume of free   peritoneal fluid. Loculated appearing collection of fluid in the anterior   abdomen that is subjacent to the midline ventral abdominal wall measuring   4.9 x 1.8 cm. Suspect an additional loculated pocket of fluid in the   right pelvis with adjacent peritoneal thickening versus a loop of small   bowel that measures 6.3 x 2.6 cm. Peritonitis or developing abscesses are   considered.  *  Mild urinary bladder wall thickening, which may be secondary to   underdistention, chronic bladder outlet obstruction, or cystitis.  *  Small bilateral pleural effusions.    Findings were discussed with CANDE Flores 2/19/2025 4:35 PM by Dr. Martin.      < end of copied text >

## 2025-02-20 NOTE — PROGRESS NOTE ADULT - ASSESSMENT
SBO  s/p repair  off NGT  plan as per surgery     HTN  cont IV BB for now  resume PO once able to take     DVT proph  on lovenox

## 2025-02-20 NOTE — PROGRESS NOTE ADULT - SUBJECTIVE AND OBJECTIVE BOX
Subjective: Patient seen and examined. No new events except as noted.     SUBJECTIVE/ROS:  nad      MEDICATIONS:  MEDICATIONS  (STANDING):  benzocaine 20% Spray 1 Spray(s) Topical once  dextrose 5% + sodium chloride 0.9%. 1000 milliLiter(s) (100 mL/Hr) IV Continuous <Continuous>  dextrose 50% Injectable 12.5 Gram(s) IV Push once  dextrose 50% Injectable 12.5 Gram(s) IV Push once  enoxaparin Injectable 40 milliGRAM(s) SubCutaneous every 24 hours  HYDROmorphone PCA (1 mG/mL) 30 milliLiter(s) PCA Continuous PCA Continuous  influenza  Vaccine (HIGH DOSE) 0.5 milliLiter(s) IntraMuscular once  metoprolol tartrate Injectable 5 milliGRAM(s) IV Push every 6 hours  pantoprazole  Injectable 40 milliGRAM(s) IV Push every 24 hours  potassium phosphate IVPB 15 milliMole(s) IV Intermittent once      PHYSICAL EXAM:  T(C): 36.4 (02-20-25 @ 08:25), Max: 36.9 (02-19-25 @ 13:03)  HR: 51 (02-20-25 @ 08:25) (51 - 74)  BP: 129/81 (02-20-25 @ 08:25) (113/60 - 152/75)  RR: 18 (02-20-25 @ 08:25) (18 - 18)  SpO2: 98% (02-20-25 @ 08:25) (94% - 98%)  Wt(kg): --  I&O's Summary    19 Feb 2025 07:01  -  20 Feb 2025 07:00  --------------------------------------------------------  IN: 2400 mL / OUT: 925 mL / NET: 1475 mL            JVP: Normal  Neck: supple  Lung: clear   CV: S1 S2 , Murmur:  Abd: soft  Ext: No edema  neuro: Awake / alert  Psych: flat affect  Skin: normal``    LABS/DATA:    CARDIAC MARKERS:                                12.3   4.57  )-----------( 278      ( 20 Feb 2025 06:03 )             37.2     02-20    141  |  106  |  10  ----------------------------<  108[H]  4.3   |  27  |  0.88    Ca    8.9      20 Feb 2025 06:03  Phos  2.4     02-20  Mg     1.9     02-20    TPro  5.4[L]  /  Alb  2.8[L]  /  TBili  0.4  /  DBili  x   /  AST  31  /  ALT  9[L]  /  AlkPhos  50  02-19    proBNP:   Lipid Profile:   HgA1c:   TSH:     TELE:  EKG:

## 2025-02-20 NOTE — PROGRESS NOTE ADULT - ASSESSMENT
79 year old man with PMHx rectal cancer s/p neoadjuvant chemo and APR (Dr. Ruiz 2010), throat cancer s/p chemo/radiation, and perforated gastric ulcer s/p Kash patch with falciform (Dr. Akhtar, 2024) admitted on 2/14/25 for closed loop SBO .  Patient underwent ex lap with open JOSELUIS on same day and post op course c/b ileus.  Patient had NGT placed (removed 2/17) and still with no output from ostomy.  TPN team consulted given insufficient enteral intake.  Patient with history of TPN 8/24-11/2024.     GOAL PN: 120g amino acids, 210 g dextrose,65 g SMOF lipid; to provide 1844 kcal/day (29 kcal/kg and 1.9 g/kg protein based on dosing wt 63.5 kg)  Non-Protein Calories: 1364 kcal/day (21 kcal/kg, based on dosing wt 63.5 kg); Dextrose Infusion Rate: 2.3 mg/kg/min (24-hour infusion); Lipid Infusion Rate: 0.98 g/kg; 0.08 g/kg/hr (12-hour infusion)      - Nutritional Assessment, patient with severe protein calorie malnutrition not meeting nutritional goals enterally due to ileus/NPO status and would likely benefit from TPN for nutritional support; prealbumin only 8 mg/dL on 2/19 - trend weekly   - TPN plan:  Discussed TPN initiation with pros/cons; patient would like to hold off on TPN today and is hoping for diet advancement today  - TPN access:  Patient will need a dedicated central line if/once TPN approved  - Anemia:  check iron studies, B12, Folate levels  - Electrolyte Imbalance risk:  recommend to check CMP, Mg, Phos and ionized Ca daily, to be supplemented peripherally per primary team.  - Ileus:  monitor ostomy output overnight, CT a/p, f/u surgical plan and nutrition parameters in AM  - IV fluids per primary team; IV fluids per primary team; can add MVI and Thiamine IV to help decrease risk of refeeding syndrome once nutrition, whether TPN vs enteral, resumes   - Recommend strict intake and output/weight checks three times a week  - Hypoglycemia:  HgA1c 5.1%; finger sticks every 6 hours to monitor glucose trend  - Risk for Hypertriglyceridemia with TPN:  plan to check baseline lipid profile in AM and monitor TG closely once/if TPN initiated   - Global care per primary team    Available on TEAMS  TPN spectra [id number] ([phone] when dialing from outside line)  M-F 8A-2P, Weekends and holidays 8/9A-12/1P  Discussed with Dr. Gerardo Silverio    Time-based billing (NON-critical care).     35 minutes spent on total encounter. The necessity of the time spent during the encounter on this date of service was due to chart review, lab/radiology test review, patient assessment, discussion and collaboration with interdisciplinary team members and ordering TPN/making recommendation.

## 2025-02-21 ENCOUNTER — TRANSCRIPTION ENCOUNTER (OUTPATIENT)
Age: 80
End: 2025-02-21

## 2025-02-21 LAB
ANION GAP SERPL CALC-SCNC: 11 MMOL/L — SIGNIFICANT CHANGE UP (ref 5–17)
BUN SERPL-MCNC: 8 MG/DL — SIGNIFICANT CHANGE UP (ref 7–23)
CALCIUM SERPL-MCNC: 8.8 MG/DL — SIGNIFICANT CHANGE UP (ref 8.4–10.5)
CHLORIDE SERPL-SCNC: 108 MMOL/L — SIGNIFICANT CHANGE UP (ref 96–108)
CO2 SERPL-SCNC: 22 MMOL/L — SIGNIFICANT CHANGE UP (ref 22–31)
CREAT SERPL-MCNC: 0.82 MG/DL — SIGNIFICANT CHANGE UP (ref 0.5–1.3)
EGFR: 90 ML/MIN/1.73M2 — SIGNIFICANT CHANGE UP
GLUCOSE BLDC GLUCOMTR-MCNC: 100 MG/DL — HIGH (ref 70–99)
GLUCOSE SERPL-MCNC: 106 MG/DL — HIGH (ref 70–99)
HCT VFR BLD CALC: 36.7 % — LOW (ref 39–50)
HGB BLD-MCNC: 12.2 G/DL — LOW (ref 13–17)
MAGNESIUM SERPL-MCNC: 1.9 MG/DL — SIGNIFICANT CHANGE UP (ref 1.6–2.6)
MCHC RBC-ENTMCNC: 30.7 PG — SIGNIFICANT CHANGE UP (ref 27–34)
MCHC RBC-ENTMCNC: 33.2 G/DL — SIGNIFICANT CHANGE UP (ref 32–36)
MCV RBC AUTO: 92.2 FL — SIGNIFICANT CHANGE UP (ref 80–100)
NRBC BLD AUTO-RTO: 0 /100 WBCS — SIGNIFICANT CHANGE UP (ref 0–0)
PHOSPHATE SERPL-MCNC: 2.7 MG/DL — SIGNIFICANT CHANGE UP (ref 2.5–4.5)
PLATELET # BLD AUTO: 233 K/UL — SIGNIFICANT CHANGE UP (ref 150–400)
POTASSIUM SERPL-MCNC: 3.7 MMOL/L — SIGNIFICANT CHANGE UP (ref 3.5–5.3)
POTASSIUM SERPL-SCNC: 3.7 MMOL/L — SIGNIFICANT CHANGE UP (ref 3.5–5.3)
RBC # BLD: 3.98 M/UL — LOW (ref 4.2–5.8)
RBC # FLD: 14.2 % — SIGNIFICANT CHANGE UP (ref 10.3–14.5)
SODIUM SERPL-SCNC: 141 MMOL/L — SIGNIFICANT CHANGE UP (ref 135–145)
WBC # BLD: 5.22 K/UL — SIGNIFICANT CHANGE UP (ref 3.8–10.5)
WBC # FLD AUTO: 5.22 K/UL — SIGNIFICANT CHANGE UP (ref 3.8–10.5)

## 2025-02-21 PROCEDURE — 99232 SBSQ HOSP IP/OBS MODERATE 35: CPT

## 2025-02-21 RX ORDER — B1/B2/B3/B5/B6/B12/VIT C/FOLIC 500-0.5 MG
1 TABLET ORAL DAILY
Refills: 0 | Status: DISCONTINUED | OUTPATIENT
Start: 2025-02-21 | End: 2025-02-23

## 2025-02-21 RX ORDER — ACETAMINOPHEN 500 MG/5ML
975 LIQUID (ML) ORAL EVERY 6 HOURS
Refills: 0 | Status: DISCONTINUED | OUTPATIENT
Start: 2025-02-21 | End: 2025-02-23

## 2025-02-21 RX ORDER — OXYCODONE HYDROCHLORIDE 30 MG/1
5 TABLET ORAL EVERY 4 HOURS
Refills: 0 | Status: DISCONTINUED | OUTPATIENT
Start: 2025-02-21 | End: 2025-02-23

## 2025-02-21 RX ORDER — POLYETHYLENE GLYCOL 3350 17 G/17G
17 POWDER, FOR SOLUTION ORAL EVERY 12 HOURS
Refills: 0 | Status: DISCONTINUED | OUTPATIENT
Start: 2025-02-21 | End: 2025-02-23

## 2025-02-21 RX ORDER — METOPROLOL SUCCINATE 50 MG/1
25 TABLET, EXTENDED RELEASE ORAL
Refills: 0 | Status: DISCONTINUED | OUTPATIENT
Start: 2025-02-21 | End: 2025-02-23

## 2025-02-21 RX ORDER — POTASSIUM PHOSPHATE, MONOBASIC POTASSIUM PHOSPHATE, DIBASIC INJECTION, 236; 224 MG/ML; MG/ML
15 SOLUTION, CONCENTRATE INTRAVENOUS ONCE
Refills: 0 | Status: COMPLETED | OUTPATIENT
Start: 2025-02-21 | End: 2025-02-21

## 2025-02-21 RX ORDER — OXYCODONE HYDROCHLORIDE 30 MG/1
2.5 TABLET ORAL EVERY 4 HOURS
Refills: 0 | Status: DISCONTINUED | OUTPATIENT
Start: 2025-02-21 | End: 2025-02-23

## 2025-02-21 RX ADMIN — SODIUM CHLORIDE 50 MILLILITER(S): 9 INJECTION, SOLUTION INTRAVENOUS at 22:06

## 2025-02-21 RX ADMIN — ENOXAPARIN SODIUM 40 MILLIGRAM(S): 100 INJECTION SUBCUTANEOUS at 05:05

## 2025-02-21 RX ADMIN — METOPROLOL SUCCINATE 5 MILLIGRAM(S): 50 TABLET, EXTENDED RELEASE ORAL at 12:53

## 2025-02-21 RX ADMIN — Medication 0.5 MILLIGRAM(S): at 01:53

## 2025-02-21 RX ADMIN — Medication 975 MILLIGRAM(S): at 23:22

## 2025-02-21 RX ADMIN — Medication 1000 MILLIGRAM(S): at 13:20

## 2025-02-21 RX ADMIN — Medication 0.2 MILLIGRAM(S): at 08:15

## 2025-02-21 RX ADMIN — NALOXEGOL OXALATE 25 MILLIGRAM(S): 12.5 TABLET, FILM COATED ORAL at 12:53

## 2025-02-21 RX ADMIN — Medication 400 MILLIGRAM(S): at 12:52

## 2025-02-21 RX ADMIN — POTASSIUM PHOSPHATE, MONOBASIC POTASSIUM PHOSPHATE, DIBASIC INJECTION, 62.5 MILLIMOLE(S): 236; 224 SOLUTION, CONCENTRATE INTRAVENOUS at 11:21

## 2025-02-21 RX ADMIN — Medication 0.5 MILLIGRAM(S): at 02:25

## 2025-02-21 RX ADMIN — OXYCODONE HYDROCHLORIDE 5 MILLIGRAM(S): 30 TABLET ORAL at 21:30

## 2025-02-21 RX ADMIN — Medication 975 MILLIGRAM(S): at 18:35

## 2025-02-21 RX ADMIN — Medication 1000 MILLIGRAM(S): at 05:45

## 2025-02-21 RX ADMIN — METOPROLOL SUCCINATE 5 MILLIGRAM(S): 50 TABLET, EXTENDED RELEASE ORAL at 05:04

## 2025-02-21 RX ADMIN — POLYETHYLENE GLYCOL 3350 17 GRAM(S): 17 POWDER, FOR SOLUTION ORAL at 18:35

## 2025-02-21 RX ADMIN — Medication 1 TABLET(S): at 18:37

## 2025-02-21 RX ADMIN — Medication 100 MILLIGRAM(S): at 18:35

## 2025-02-21 RX ADMIN — METOPROLOL SUCCINATE 25 MILLIGRAM(S): 50 TABLET, EXTENDED RELEASE ORAL at 18:35

## 2025-02-21 RX ADMIN — Medication 400 MILLIGRAM(S): at 05:04

## 2025-02-21 RX ADMIN — Medication 0.2 MILLIGRAM(S): at 07:45

## 2025-02-21 RX ADMIN — OXYCODONE HYDROCHLORIDE 5 MILLIGRAM(S): 30 TABLET ORAL at 20:25

## 2025-02-21 RX ADMIN — SODIUM CHLORIDE 50 MILLILITER(S): 9 INJECTION, SOLUTION INTRAVENOUS at 20:25

## 2025-02-21 NOTE — PROGRESS NOTE ADULT - ASSESSMENT
79 year old man with PMHx rectal cancer s/p neoadjuvant chemo and APR (Dr. Ruiz 2010), throat cancer s/p chemo/radiation, and perforated gastric ulcer s/p Kash patch with falciform (Dr. Akhtar, 2024) admitted on 2/14/25 for closed loop SBO .  Patient underwent ex lap with open JOSELUIS on same day and post op course c/b ileus.  Patient had NGT placed (removed 2/17) and still with no output from ostomy.  TPN team consulted given insufficient enteral intake.  Patient with history of TPN 8/24-11/2024.     GOAL PN: 120g amino acids, 210 g dextrose,65 g SMOF lipid; to provide 1844 kcal/day (29 kcal/kg and 1.9 g/kg protein based on dosing wt 63.5 kg)  Non-Protein Calories: 1364 kcal/day (21 kcal/kg, based on dosing wt 63.5 kg); Dextrose Infusion Rate: 2.3 mg/kg/min (24-hour infusion); Lipid Infusion Rate: 0.98 g/kg; 0.08 g/kg/hr (12-hour infusion)      - Nutritional Assessment, patient with severe protein calorie malnutrition not meeting nutritional goals enterally due to ileus/NPO status and would likely benefit from TPN for nutritional support; prealbumin only 8 mg/dL on 2/19 - trend weekly   - TPN plan:  Discussed TPN initiation with pros/cons; patient would like to hold off on TPN today and is hoping tolerance of CLD today  - TPN access:  Patient will need a dedicated central line if/once TPN approved  - Anemia:  check iron studies, B12, Folate levels  - Electrolyte Imbalance risk:  recommend to check CMP, Mg, Phos and ionized Ca daily, to be supplemented peripherally per primary team.  - Ileus:  monitor ostomy output overnight, CT a/p, f/u surgical plan and nutrition parameters in AM  - IV fluids per primary team; IV fluids per primary team; can add MVI and Thiamine IV to help decrease risk of refeeding syndrome once nutrition, whether TPN vs enteral, resumes   - Recommend strict intake and output/weight checks three times a week  - Hypoglycemia:  HgA1c 5.1%; finger sticks every 6 hours to monitor glucose trend  - Risk for Hypertriglyceridemia with TPN:  plan to check baseline lipid profile in AM and monitor TG closely once/if TPN initiated   - Global care per primary team    Available on TEAMS  TPN spectra [id number] ([phone] when dialing from outside line)  M-F 8A-2P, Weekends and holidays 8/9A-12/1P  Discussed with Dr. Gerardo Silverio    Time-based billing (NON-critical care).     35 minutes spent on total encounter. The necessity of the time spent during the encounter on this date of service was due to chart review, lab/radiology test review, patient assessment, discussion and collaboration with interdisciplinary team members and ordering TPN/making recommendation.

## 2025-02-21 NOTE — DISCHARGE NOTE PROVIDER - NSDCFUSCHEDAPPT_GEN_ALL_CORE_FT
Dhiraj Camarillo  [facility]  [facility] [address] [facility]  Scheduled Appointment: 02/26/2025

## 2025-02-21 NOTE — DISCHARGE NOTE PROVIDER - NSDCCPCAREPLAN_GEN_ALL_CORE_FT
PRINCIPAL DISCHARGE DIAGNOSIS  Diagnosis: Small bowel obstruction  Assessment and Plan of Treatment: HOSPITAL COURSE: You had exploratory laparotomy and lysis of adhesions on 2/14/25  FOLLOW UP: You may follow up at the [facility] in 2-4 weeks. You may follow up with Dr. Akhtar or any of their partners (Dr. Arguello, Luana, Luz, Higinio, Marcio, Marco, Maame, Matias, Octavio, Shawn). Please call [phone] if you have any questions or concerns regarding your surgery and treatment      SECONDARY DISCHARGE DIAGNOSES  Diagnosis: Hypertension  Assessment and Plan of Treatment: Low salt diet  Activity as tolerated.  Take all medication as prescribed.  Follow up with your medical doctor for routine blood pressure monitoring at your next visit.  Notify your doctor if you have any of the following symptoms:   Dizziness, Lightheadedness, Blurry vision, Headache, Chest pain, Shortness of breath  Please follow up with your primary care doctor regarding medications for hypertension

## 2025-02-21 NOTE — DISCHARGE NOTE PROVIDER - NSDCCPTREATMENT_GEN_ALL_CORE_FT
PRINCIPAL PROCEDURE  Procedure: Exploratory laparotomy  Findings and Treatment: OSTOMY: You will be discharged with an ostomy bag. You will need to empty them and record outputs accurately. This will be taught to you by the nursing staff. Please bring to the office accurate records of output. Your output should be no less than 200 mL and no more than 1100mL. If out of range, please call the office for further recommendations.   WOUND CARE: Keep your incisions clean and dry  BATHING: You may shower and/or sponge bathe 24 hours after surgery. Let soap and water run over incision; do NOT scrub incision. Pat abdomen dry after. Do no submerge the incision underwater (no baths, swimming pools, hot tubs) for the next 2 weeks.  ACTIVITY: No heavy lifting anything more than 10-15lbs or straining. Otherwise, you may return to your usual level of physical activity. If you are taking narcotic pain medication (such as Percocet), do NOT drive a car, operate machinery or make important decisions.  PAIN: A prescription for oxycodone has been sent to the pharmacy. You should only take these for severe pain. For mild or moderate pain, you may take 975mg of tylenol every 6 hours. Do not exceed more than 4G per day.   NOTIFY YOUR SURGEON IF: You have any bleeding that does not stop, any pus draining from your wound, any fever (over 100.4 F) or chills, persistent nausea/vomiting with inability to tolerate food or liquids, persistent diarrhea, or if severe abdominal pain is not controlled on your discharge pain medications.  FOLLOW-UP:  1. Please call to make a follow-up appointment within 2-4 weeks of discharge with Dr. Akhtar/[facility]  2. Please follow up with your primary care physician in one week regarding your hospitalization.      SECONDARY PROCEDURE  Procedure: Open lysis of intestinal adhesions  Findings and Treatment:

## 2025-02-21 NOTE — DISCHARGE NOTE PROVIDER - NSDCFUADDAPPT_GEN_ALL_CORE_FT
You may follow up at the Acute Care Surgery Clinic in 2-4 weeks. You may follow up with Dr. Akhtar or any of their partners (Dr. Arguello, Luz Craft, Higinio, Marcio, Marco, Matias Isabel, Shawn Cunningham). Please call [phone] if you have any questions or concerns regarding your surgery and treatment    Please follow up with your primary care provider in 1-2 weeks after discharge from the hospital

## 2025-02-21 NOTE — PROGRESS NOTE ADULT - SUBJECTIVE AND OBJECTIVE BOX
ACUTE CARE SURGERY DAILY PROGRESS NOTE    24 Hour/Overnight Events: Pt s/p stoma digitization, and was started on Movantik yesterday (2/20).    SUBJECTIVE: Patient seen and evaluated on AM rounds. Pt reports appropriate surgical incisional abdominal pain, which is adequately controlled on current pain regimen. Tolerating ice chips/sips; eager to eat. Ambulating well.   Denies fevers/chills, chest pain, dyspnea, nausea/vomiting    ------------------------------------------------------------------------------------------------------------  OBJECTIVE:  Vital Signs Last 24 Hrs  T(C): 36.7 (21 Feb 2025 05:03), Max: 36.7 (20 Feb 2025 22:41)  T(F): 98.1 (21 Feb 2025 05:03), Max: 98.1 (21 Feb 2025 05:03)  HR: 72 (21 Feb 2025 05:03) (51 - 84)  BP: 145/83 (21 Feb 2025 05:03) (129/78 - 176/84)  BP(mean): --  RR: 18 (21 Feb 2025 05:03) (18 - 18)  SpO2: 98% (21 Feb 2025 05:03) (95% - 98%)    Parameters below as of 21 Feb 2025 05:03  Patient On (Oxygen Delivery Method): room air      I&O's Detail    20 Feb 2025 07:01  -  21 Feb 2025 07:00  --------------------------------------------------------  IN:    dextrose 5% + sodium chloride 0.9%: 2400 mL    IV PiggyBack: 200 mL  Total IN: 2600 mL    OUT:    Colostomy (mL): 0 mL    Oral Fluid: 0 mL    Voided (mL): 1050 mL  Total OUT: 1050 mL    Total NET: 1550 mL      LABS:                        12.3   4.57  )-----------( 278      ( 20 Feb 2025 06:03 )             37.2     02-20    141  |  106  |  10  ----------------------------<  108[H]  4.3   |  27  |  0.88    Ca    8.9      20 Feb 2025 06:03  Phos  2.4     02-20  Mg     1.9     02-20    PT/INR - ( 20 Feb 2025 06:03 )   PT: 23.2 sec;   INR: 2.03 ratio    PTT - ( 20 Feb 2025 06:03 )  PTT:42.4 sec    Urinalysis Basic - ( 20 Feb 2025 06:03 )  Color: x / Appearance: x / SG: x / pH: x  Gluc: 108 mg/dL / Ketone: x  / Bili: x / Urobili: x   Blood: x / Protein: x / Nitrite: x   Leuk Esterase: x / RBC: x / WBC x   Sq Epi: x / Non Sq Epi: x / Bacteria: x      PHYSICAL EXAM:  Constitutional: Well developed, well nourished, NAD  Chest: Symmetric chest rise bilaterally, no increased WOB  Abdomen: Soft, nondistended, appropriate sx incisional tenderness with midline incision c/d/i. Colostomy pink with hard stool by stoma and bowel sweat.   Extremities: Warm to touch, soft, sensory and motor intact. No cyanosis/erythema/edema/hematoma

## 2025-02-21 NOTE — DISCHARGE NOTE PROVIDER - DETAILS OF MALNUTRITION DIAGNOSIS/DIAGNOSES
This patient has been assessed with a concern for Malnutrition and was treated during this hospitalization for the following Nutrition diagnosis/diagnoses:     -  02/18/2025: Severe protein-calorie malnutrition

## 2025-02-21 NOTE — PROGRESS NOTE ADULT - SUBJECTIVE AND OBJECTIVE BOX
[facility] NUTRITION SUPPORT-- FOLLOW UP NOTE      24 hour events/subjective:   Patient seen and examined at bedside, chart reviewed and events noted and I's and O's reviewed.  Patient denies chest pain, shortness of breath, nausea or vomiting, dizziness, chills at time of visit; s/p stoma digitization.  Phos repletion noted; patient's CT a/p noted, CLD trial started today as well as Movantik.  .  Patient's VSS except elevated SBP and HR in 50's and no other acute overnight events noted.      Diet:  Diet, Clear Liquid:   2000mL Fluid Restriction ([id number]) (02-21-25 @ 08:27)      PAST HISTORY  --------------------------------------------------------------------------------  PAST MEDICAL & SURGICAL HISTORY:  Rectal Neoplasm  treated with oral chemothearpy and radiation 5/10- 6/10        Hemorrhoid      Malignant neoplasm of oropharynx      Colostomy status      Neck mass      S/P Colonoscopy  3/10      Rectal cancer      S/P colostomy      History of cancer chemotherapy      S/P radiation therapy        No significant changes to PMH, PSH, FHx, SHx, unless otherwise noted    ALLERGIES & MEDICATIONS  --------------------------------------------------------------------------------  Allergies    No Known Allergies    Intolerances      Standing Inpatient Medications  benzocaine 20% Spray 1 Spray(s) Topical once  dextrose 5% + sodium chloride 0.9%. 1000 milliLiter(s) IV Continuous <Continuous>  dextrose 50% Injectable 12.5 Gram(s) IV Push once  dextrose 50% Injectable 12.5 Gram(s) IV Push once  enoxaparin Injectable 40 milliGRAM(s) SubCutaneous every 24 hours  influenza  Vaccine (HIGH DOSE) 0.5 milliLiter(s) IntraMuscular once  metoprolol tartrate Injectable 5 milliGRAM(s) IV Push every 6 hours  naloxegol 25 milliGRAM(s) Oral daily    PRN Inpatient Medications  HYDROmorphone  Injectable 0.2 milliGRAM(s) IV Push every 4 hours PRN  HYDROmorphone  Injectable 0.5 milliGRAM(s) IV Push every 4 hours PRN  nalbuphine Injectable 2.5 milliGRAM(s) IV Push every 6 hours PRN  naloxone Injectable 0.1 milliGRAM(s) IV Push every 3 minutes PRN        VITALS/PHYSICAL EXAM  --------------------------------------------------------------------------------  T(C): 36 (02-21-25 @ 14:05), Max: 36.8 (02-21-25 @ 08:42)  HR: 76 (02-21-25 @ 14:05) (52 - 76)  BP: 150/85 (02-21-25 @ 14:05) (129/78 - 168/99)  RR: 18 (02-21-25 @ 14:05) (18 - 18)  SpO2: 99% (02-21-25 @ 14:05) (95% - 99%)  Wt(kg): --      02-20-25 @ 07:01  -  02-21-25 @ 07:00  --------------------------------------------------------  IN: 2600 mL / OUT: 1050 mL / NET: 1550 mL      I&O's Detail    20 Feb 2025 07:01  -  21 Feb 2025 07:00  --------------------------------------------------------  IN:    dextrose 5% + sodium chloride 0.9%: 2400 mL    IV PiggyBack: 200 mL  Total IN: 2600 mL    OUT:    Colostomy (mL): 0 mL    Oral Fluid: 0 mL    Voided (mL): 1050 mL  Total OUT: 1050 mL    Total NET: 1550 mL          Physical Exam:  Gen: NAD, laying in bed   HEENT:  supple neck, no JVD  Chest: non labored breathing, equal chest expansion bilaterally  Abd: Soft and nontender. No ostomy output. Ostomy pink and viable.   : No suprapubic tenderness  Ext: Warm, FROM, no edema b/l LE  Neuro: No focal deficits  Psych: Normal affect and mood  Skin: Warm, without rashes     LABS/STUDIES  --------------------------------------------------------------------------------              12.2   5.22  >-----------<  233      [02-21-25 @ 07:10]              36.7     141  |  108  |  8   ----------------------------<  106      [02-21-25 @ 07:10]  3.7   |  22  |  0.82        Ca     8.8     [02-21-25 @ 07:10]      Mg     1.9     [02-21-25 @ 07:10]      Phos  2.7     [02-21-25 @ 07:10]      PT/INR: PT 23.2 , INR 2.03       [02-20-25 @ 06:03]  PTT: 42.4       [02-20-25 @ 06:03]      Ca ionized  Creatinine Trend:  POC glucoseGlucose: 106 mg/dL (02-21-25 @ 07:10)  CAPILLARY BLOOD GLUCOSE      POCT Blood Glucose.: 100 mg/dL (21 Feb 2025 06:38)  POCT Blood Glucose.: 114 mg/dL (20 Feb 2025 23:58)    PrealbuminPrealbumin, Serum: 8 mg/dL (02-19-25 @ 07:14)    Triglycerides

## 2025-02-21 NOTE — DISCHARGE NOTE PROVIDER - HOSPITAL COURSE
79 year old man with PMHx of rectal cancer s/p neoadjuvant chemo and APR with colostomy (Dr. Ruiz 2010), throat cancer s/p chemo/radiation, and perforated gastric ulcer s/p Kash patch with falciform (Dr. Akhtar, 2024), presenting with 2 days of abdominal discomfort. Reports onset of symptoms two days ago, with decreased ostomy output (last gas/stool also two days ago). He denies nausea or vomiting, has been taking less PO with fear of provoking discomfort. Saw his GI outpatient, who recommended a CT scan that was performed today.  Here he is afebrile and hemodynamically normal. Labs notable for lactate 3.   CT abd/pelvis (2/19/25) significant for dilated small bowel with short segment of tapering to underdistention in the pelvis, suspicious for small bowel obstruction; the small bowel   distal to this area is mostly decompressed; moderate volume of pneumoperitoneum; small to moderate volume of free peritoneal fluid; loculated appearing collection of fluid in the anterior abdomen that is subjacent to the midline ventral abdominal wall measuring 4.9 x 1.8 cm; an additional loculated pocket of fluid in the right pelvis with adjacent peritoneal thickening versus a loop of small bowel that measures 6.3 x 2.6 cm.     Pt was admitted under Acute Care Surgery for further evaluation and management. Pt was taken to the OR on 2/14/25, and is s/p ex-lap and lysis of adhesions. The patient tolerated the procedure well (see operative report for full details). NGT was kept post op. Pt was transferred to the PACU in stable condition. In the PACU, the patient's pain was controlled with PCA pump, and vital signs within normal limits. On POC, the patient was recovering appropriately. The patient was transferred to the surgical floor in stable condition. On POD #1, pt was stable and doing well. Pt's Sánchez was discontinued on 2/15, and passed TOV. Pt passed NGT clamp trial on 2/17, and it was subsequently removed. PCA pump was discontinued on 2/20, and pt was transitioned to oral Tylenol with Oxycodone for breakthrough pain. Pt s/p stoma digitization, and was started on Movantik on 2/20. Diet was slowly advanced as tolerated starting on 2/21, and GI function returned.   Ambulation and incentive spirometry encouraged. Labs were monitored daily, and electrolytes were repleted as necessary.     Cardiology was consulted for cardiac co-management; recommendations were followed    Physical therapy evaluated the patient and recommended no skilled PT needs    On the day of discharge, the patient's vital signs are within normal limits, pain is controlled, voiding urine, passing gas/stool via colostomy, tolerating a PO diet, and ambulating well. Pt will f/u with Dr. Akhtar/ACS clinic in 2-4 weeks. Pt will f/u with PCP in 1-2 weeks. 79 year old man with PMHx of rectal cancer s/p neoadjuvant chemo and APR with colostomy (Dr. Ruiz 2010), throat cancer s/p chemo/radiation, and perforated gastric ulcer s/p Kash patch with falciform (Dr. Akhtar, 2024), presenting with 2 days of abdominal discomfort. Reports onset of symptoms two days ago, with decreased ostomy output (last gas/stool also two days ago). He denies nausea or vomiting, has been taking less PO with fear of provoking discomfort. Saw his GI outpatient, who recommended a CT scan that was performed today.  Here he is afebrile and hemodynamically normal. Labs notable for lactate 3.   CT abd/pelvis (2/19/25) significant for dilated small bowel with short segment of tapering to underdistention in the pelvis, suspicious for small bowel obstruction; the small bowel   distal to this area is mostly decompressed; moderate volume of pneumoperitoneum; small to moderate volume of free peritoneal fluid; loculated appearing collection of fluid in the anterior abdomen that is subjacent to the midline ventral abdominal wall measuring 4.9 x 1.8 cm; an additional loculated pocket of fluid in the right pelvis with adjacent peritoneal thickening versus a loop of small bowel that measures 6.3 x 2.6 cm.     Pt was admitted under Acute Care Surgery for further evaluation and management. Pt was taken to the OR on 2/14/25, and is s/p ex-lap and lysis of adhesions. The patient tolerated the procedure well (see operative report for full details). NGT was kept post op. Pt was transferred to the PACU in stable condition. In the PACU, the patient's pain was controlled with PCA pump, and vital signs within normal limits. On POC, the patient was recovering appropriately. The patient was transferred to the surgical floor in stable condition. On POD #1, pt was stable and doing well. Pt's Sánchez was discontinued on 2/15, and passed TOV. Pt passed NGT clamp trial on 2/17, and it was subsequently removed. PCA pump was discontinued on 2/20, and pt was transitioned to oral Tylenol with Oxycodone for breakthrough pain.   Hospital course c/b low ostomy output. CT abd/pelvis (2/19/25) significant for narrowed segment of small bowel and several areas of fluid collection that are not rim enhancing (not well formed abscesses). AXR (2/20/25) with contrast in colon. Pt s/p stoma digitization, and was started on Movantik on 2/20. Diet was slowly advanced as tolerated starting on 2/21, and GI function returned.   Ambulation and incentive spirometry encouraged. Labs were monitored daily, and electrolytes were repleted as necessary.     Cardiology was consulted for cardiac co-management; recommendations were followed    Physical therapy evaluated the patient and recommended no skilled PT needs    On the day of discharge, the patient's vital signs are within normal limits, pain is controlled, voiding urine, passing gas/stool via colostomy, tolerating a PO diet, and ambulating well. Pt will f/u with Dr. Akhtar/ACS clinic in 2-4 weeks. Pt will f/u with PCP in 1-2 weeks.

## 2025-02-21 NOTE — DISCHARGE NOTE PROVIDER - NSDCMRMEDTOKEN_GEN_ALL_CORE_FT
acetaminophen 325 mg oral tablet: 3 tab(s) orally every 6 hours  amoxicillin 500 mg oral capsule: 2 cap(s) orally 2 times a day  clarithromycin 500 mg oral tablet: 1 tab(s) orally 2 times a day  fat emulsion with fish, medium chain, olive, and soy oil 20% intravenous emulsion: intravenous once a day  intravenous electrolyte (Lypholyte II/Nutrilyte II/TPN Electrolytes) solution: 1 each intravenous once a day  metoclopramide 10 mg oral tablet: 1 tab(s) orally every 6 hours as needed for  nausea take 1 tablet every 6 hours AS NEEDED for nausea  NIFEdipine 30 mg oral tablet, extended release: 1 tab(s) orally once a day  omeprazole 20 mg oral delayed release tablet: 1 tab(s) orally 2 times a day  omeprazole/clarithromycin/amoxicillin 20 mg-500 mg-500 mg oral kit: 1 kit orally 2 times a day Please take 1 pill each of each medication twice a day for 10 days for complete treatment.  oxyCODONE 5 mg oral tablet: 1 tab(s) orally every 6 hours as needed for Severe Pain (7 - 10) MDD: 4  pantoprazole 40 mg oral delayed release tablet: 1 tab(s) orally 2 times a day   acetaminophen 325 mg oral tablet: 3 tab(s) orally every 6 hours  fat emulsion with fish, medium chain, olive, and soy oil 20% intravenous emulsion: intravenous once a day  intravenous electrolyte (Lypholyte II/Nutrilyte II/TPN Electrolytes) solution: 1 each intravenous once a day  metoclopramide 10 mg oral tablet: 1 tab(s) orally every 6 hours as needed for  nausea take 1 tablet every 6 hours AS NEEDED for nausea  Multiple Vitamins oral tablet: 1 tab(s) orally once a day  NIFEdipine 30 mg oral tablet, extended release: 1 tab(s) orally once a day  polyethylene glycol 3350 oral powder for reconstitution: 17 gram(s) orally every 12 hours  senna leaf extract oral tablet: 1 tab(s) orally once a day  thiamine 100 mg oral tablet: 1 tab(s) orally every 24 hours   acetaminophen 325 mg oral tablet: 3 tab(s) orally every 6 hours  metoclopramide 10 mg oral tablet: 1 tab(s) orally every 6 hours as needed for  nausea take 1 tablet every 6 hours AS NEEDED for nausea  Multiple Vitamins oral tablet: 1 tab(s) orally once a day  NIFEdipine 30 mg oral tablet, extended release: 1 tab(s) orally once a day  polyethylene glycol 3350 oral powder for reconstitution: 17 gram(s) orally every 12 hours  senna leaf extract oral tablet: 1 tab(s) orally once a day  thiamine 100 mg oral tablet: 1 tab(s) orally every 24 hours

## 2025-02-21 NOTE — PROGRESS NOTE ADULT - SUBJECTIVE AND OBJECTIVE BOX
Subjective: Patient seen and examined. No new events except as noted.     SUBJECTIVE/ROS:  nad      MEDICATIONS:  MEDICATIONS  (STANDING):  acetaminophen   IVPB .. 1000 milliGRAM(s) IV Intermittent every 6 hours  benzocaine 20% Spray 1 Spray(s) Topical once  dextrose 5% + sodium chloride 0.9%. 1000 milliLiter(s) (100 mL/Hr) IV Continuous <Continuous>  dextrose 50% Injectable 12.5 Gram(s) IV Push once  dextrose 50% Injectable 12.5 Gram(s) IV Push once  enoxaparin Injectable 40 milliGRAM(s) SubCutaneous every 24 hours  influenza  Vaccine (HIGH DOSE) 0.5 milliLiter(s) IntraMuscular once  metoprolol tartrate Injectable 5 milliGRAM(s) IV Push every 6 hours  naloxegol 25 milliGRAM(s) Oral daily  potassium phosphate IVPB 15 milliMole(s) IV Intermittent once      PHYSICAL EXAM:  T(C): 36.8 (02-21-25 @ 08:42), Max: 36.8 (02-21-25 @ 08:42)  HR: 75 (02-21-25 @ 08:42) (52 - 84)  BP: 150/78 (02-21-25 @ 08:42) (129/78 - 176/84)  RR: 18 (02-21-25 @ 08:42) (18 - 18)  SpO2: 99% (02-21-25 @ 08:42) (95% - 99%)  Wt(kg): --  I&O's Summary    20 Feb 2025 07:01  -  21 Feb 2025 07:00  --------------------------------------------------------  IN: 2600 mL / OUT: 1050 mL / NET: 1550 mL            JVP: Normal  Neck: supple  Lung: clear   CV: S1 S2 , Murmur:  Abd: soft  Ext: No edema  neuro: Awake / alert  Psych: flat affect  Skin: normal``    LABS/DATA:    CARDIAC MARKERS:                                12.2   5.22  )-----------( 233      ( 21 Feb 2025 07:10 )             36.7     02-21    141  |  108  |  8   ----------------------------<  106[H]  3.7   |  22  |  0.82    Ca    8.8      21 Feb 2025 07:10  Phos  2.7     02-21  Mg     1.9     02-21      proBNP:   Lipid Profile:   HgA1c:   TSH:     TELE:  EKG:

## 2025-02-21 NOTE — DISCHARGE NOTE PROVIDER - CARE PROVIDER_API CALL
[facility]  [address] [place] [address], [facility] [address]  [place] [address]  Phone: [phone]  Fax: [phone]  Follow Up Time: 2 weeks

## 2025-02-21 NOTE — CHART NOTE - NSCHARTNOTEFT_GEN_A_CORE
NUTRITION FOLLOW UP NOTE    PATIENT SEEN FOR: malnutrition follow-up, inadequate diet order > 4 days     SOURCE: [x] Patient  [x] Current Medical Record  [] RN  [] Family/support person at bedside  [] Patient unavailable/inappropriate  [] Other:    CHART REVIEWED/EVENTS NOTED.  [] No changes to nutrition care plan to note  [x] Nutrition Status:  -presented with closed loop bowel obstruction,  s/p ex-lap and open JOSELUIS on 2/14/25.   -Hospital course c/b low colostomy output. CT abd/pelvis (2/19/25) significant for narrowed segment of small bowel and several areas of fluid collection. AXR (2/20/25) with contrast in colon  -TPN team consulted in setting of prolonged in adequate intake and malnutrition, pt declining TPN      DIET ORDER:   Diet, Clear Liquid:   2000mL Fluid Restriction ([id number]) (02-21-25)      CURRENT DIET ORDER IS:  [] Appropriate:  [x] Inadequate: pt NPO from 2/14-2/21, now advanced to clear liquid diet, awaiting tray during visit   [] Other:    NUTRITION INTAKE/PROVISION:  [x] PO: pt NPO x >1 week, declining PN at this time. Reports tolerating sips of water and ice chips. Awaiting clear liquid breakfast tray this morning. Denies any nausea, emesis. Eager for diet advancement   [] Enteral Nutrition:  [] Parenteral Nutrition:    ANTHROPOMETRICS:  Drug Dosing Weight  Height (cm): 180.3 (14 Feb 2025 18:57)  Weight (kg): 63.5 (14 Feb 2025 18:57)  BMI (kg/m2): 19.5 (14 Feb 2025 18:57)  BSA (m2): 1.81 (14 Feb 2025 18:57)  Weights: no new weights to assess    MEDICATIONS:  MEDICATIONS  (STANDING):  acetaminophen   IVPB .. 1000 milliGRAM(s) IV Intermittent every 6 hours  benzocaine 20% Spray 1 Spray(s) Topical once  dextrose 5% + sodium chloride 0.9%. 1000 milliLiter(s) (100 mL/Hr) IV Continuous <Continuous>  dextrose 50% Injectable 12.5 Gram(s) IV Push once  dextrose 50% Injectable 12.5 Gram(s) IV Push once  enoxaparin Injectable 40 milliGRAM(s) SubCutaneous every 24 hours  influenza  Vaccine (HIGH DOSE) 0.5 milliLiter(s) IntraMuscular once  metoprolol tartrate Injectable 5 milliGRAM(s) IV Push every 6 hours  naloxegol 25 milliGRAM(s) Oral daily  potassium phosphate IVPB 15 milliMole(s) IV Intermittent once    MEDICATIONS  (PRN):  HYDROmorphone  Injectable 0.2 milliGRAM(s) IV Push every 4 hours PRN Moderate Pain (4 - 6)  HYDROmorphone  Injectable 0.5 milliGRAM(s) IV Push every 4 hours PRN Severe Pain (7 - 10)  nalbuphine Injectable 2.5 milliGRAM(s) IV Push every 6 hours PRN Pruritus  naloxone Injectable 0.1 milliGRAM(s) IV Push every 3 minutes PRN For ANY of the following changes in patient status:  A. RR LESS THAN 10 breaths per minute, B. Oxygen saturation LESS THAN 90%, C. Sedation score of 6      NUTRITIONALLY PERTINENT LABS:  02-21 Na141 mmol/L Glu 106 mg/dL[H] K+ 3.7 mmol/L Cr  0.82 mg/dL BUN 8 mg/dL 02-21 Phos 2.7 mg/dL 02-19 Alb 2.8 g/dL[L] 02-19 PAB 8 mg/dL[L] 02-19 Chol 143 mg/dL LDL --    HDL 38 mg/dL[L] Trig 119 mg/dL02-19 ALT 9 U/L[L] AST 31 U/L Alkaline Phosphatase 50 U/L  02-19-25 @ 07:13 a1c 5.1    A1C with Estimated Average Glucose Result: 5.1 % (02-19-25 @ 07:13)          Finger Sticks:  POCT Blood Glucose.: 100 mg/dL (02-21 @ 06:38)  POCT Blood Glucose.: 114 mg/dL (02-20 @ 23:58)      NUTRITIONALLY PERTINENT MEDICATIONS/LABS:  [x] Reviewed  [] Relevant notes on medications/labs:    EDEMA:  [x] Reviewed  [] Relevant notes:    GI/ I&O:  [x] Reviewed  [] Relevant notes:  [x] Other:  Colostomy output x 24hrs: 0ml    SKIN:   [] No pressure injuries documented, per nursing flowsheet  [x] Pressure injury previously noted: sacrum suspected deep tissue injury   [] Change in pressure injury documentation:  [] Other:    ESTIMATED NEEDS:  [] No change:  [x] Updated:  Energy: 0439-0317  kcal/day 28-32 kcal/kg)  Protein:   g/day (1.2-2 g/kg)  Fluid:   ml/day or [x] defer to team  Based on: dosing weight os 63.5Kg, upper range for TPN if initiated     NUTRITION DIAGNOSIS:  [x] Prior Dx:  1) Malnutrition (acute on chronic)  [] New Dx:    EDUCATION:  [x] Yes: encourage intake of protein-rich foods as able, small frequent meals   [] Not appropriate/warranted    NUTRITION CARE PLAN:  1. Diet: Defer advancement to team. Consider advancing to low fiber diet + Ensure Plus High protein 2x daily as feasible  2. Parenteral nutrition per team. If pt unable to tolerate PO diet and PN within GOC consider:   GOAL PN: 120g amino acids, 210 g dextrose,65 g SMOF lipid; to provide 1844 kcal/day (29 kcal/kg and 1.9 g/kg protein based on dosing wt 63.5 kg)  Non-Protein Calories: 1364 kcal/day (21 kcal/kg, based on dosing wt 63.5 kg)  Dextrose Infusion Rate: 2.3 mg/kg/min (24-hour infusion)  Lipid Infusion Rate: 0.98 g/kg; 0.08 g/kg/hr (12-hour infusion)  3. Consider addition of multivitamin and ascorbic acid daily to promote wound healing if no contraindications.     MONITORING AND EVALUATION:   RD remains available upon request and will follow up per protocol.    Kiki Nolasco, RD, CDN, CDCES, Available on Teams

## 2025-02-21 NOTE — PROGRESS NOTE ADULT - ASSESSMENT
79M with PMHx rectal cancer s/p neoadjuvant chemo and APR (Dr. Hogan 2010), throat cancer s/p chemo/radiation, and perforated gastric ulcer s/p Kash patch with falciform (Dr. Akhtar, 2024), presenting with closed loop bowel obstruction. He is now s/p ex-lap and open JOSELUIS on 2/14/25. Hospital course c/b low colostomy output. CT abd/pelvis (2/19/25) significant for narrowed segment of small bowel and several areas of fluid collection that are not rim enhancing (not well formed abscesses). AXR (2/20/25) with contrast in colon    PLAN:  - Monitor ostomy function  - Pain control: IV Tylenol ATC, IV Dilaudid PRN  - Diet: Sips and chips  - Hold off on IR consult for collections as they are small and pt afebrile without leukocytosis  - TPN was consulted as patient has not eaten for over 7 days and very malnourished prior to surgery. Patient and family refusing TPN at this time and wanting to wait.  - OOB and ambulating as tolerated  - DVT ppx: LVX  - PT: NPT      ACS/Trauma Surgery  [id number], [phone]

## 2025-02-22 LAB
ANION GAP SERPL CALC-SCNC: 12 MMOL/L — SIGNIFICANT CHANGE UP (ref 5–17)
BUN SERPL-MCNC: 11 MG/DL — SIGNIFICANT CHANGE UP (ref 7–23)
CALCIUM SERPL-MCNC: 9.2 MG/DL — SIGNIFICANT CHANGE UP (ref 8.4–10.5)
CHLORIDE SERPL-SCNC: 108 MMOL/L — SIGNIFICANT CHANGE UP (ref 96–108)
CO2 SERPL-SCNC: 20 MMOL/L — LOW (ref 22–31)
CREAT SERPL-MCNC: 0.92 MG/DL — SIGNIFICANT CHANGE UP (ref 0.5–1.3)
EGFR: 86 ML/MIN/1.73M2 — SIGNIFICANT CHANGE UP
GLUCOSE BLDC GLUCOMTR-MCNC: 101 MG/DL — HIGH (ref 70–99)
GLUCOSE BLDC GLUCOMTR-MCNC: 85 MG/DL — SIGNIFICANT CHANGE UP (ref 70–99)
GLUCOSE BLDC GLUCOMTR-MCNC: 89 MG/DL — SIGNIFICANT CHANGE UP (ref 70–99)
GLUCOSE SERPL-MCNC: 98 MG/DL — SIGNIFICANT CHANGE UP (ref 70–99)
HCT VFR BLD CALC: 38 % — LOW (ref 39–50)
HGB BLD-MCNC: 12.7 G/DL — LOW (ref 13–17)
MAGNESIUM SERPL-MCNC: 1.8 MG/DL — SIGNIFICANT CHANGE UP (ref 1.6–2.6)
MCHC RBC-ENTMCNC: 30.4 PG — SIGNIFICANT CHANGE UP (ref 27–34)
MCHC RBC-ENTMCNC: 33.4 G/DL — SIGNIFICANT CHANGE UP (ref 32–36)
MCV RBC AUTO: 90.9 FL — SIGNIFICANT CHANGE UP (ref 80–100)
NRBC BLD AUTO-RTO: 0 /100 WBCS — SIGNIFICANT CHANGE UP (ref 0–0)
PHOSPHATE SERPL-MCNC: 2.7 MG/DL — SIGNIFICANT CHANGE UP (ref 2.5–4.5)
PLATELET # BLD AUTO: 321 K/UL — SIGNIFICANT CHANGE UP (ref 150–400)
POTASSIUM SERPL-MCNC: 4.1 MMOL/L — SIGNIFICANT CHANGE UP (ref 3.5–5.3)
POTASSIUM SERPL-SCNC: 4.1 MMOL/L — SIGNIFICANT CHANGE UP (ref 3.5–5.3)
RBC # BLD: 4.18 M/UL — LOW (ref 4.2–5.8)
RBC # FLD: 14.4 % — SIGNIFICANT CHANGE UP (ref 10.3–14.5)
SODIUM SERPL-SCNC: 140 MMOL/L — SIGNIFICANT CHANGE UP (ref 135–145)
WBC # BLD: 6.45 K/UL — SIGNIFICANT CHANGE UP (ref 3.8–10.5)
WBC # FLD AUTO: 6.45 K/UL — SIGNIFICANT CHANGE UP (ref 3.8–10.5)

## 2025-02-22 PROCEDURE — 99232 SBSQ HOSP IP/OBS MODERATE 35: CPT | Mod: FS

## 2025-02-22 RX ORDER — SENNA 187 MG
1 TABLET ORAL DAILY
Refills: 0 | Status: DISCONTINUED | OUTPATIENT
Start: 2025-02-22 | End: 2025-02-23

## 2025-02-22 RX ADMIN — Medication 1 TABLET(S): at 11:33

## 2025-02-22 RX ADMIN — Medication 975 MILLIGRAM(S): at 11:32

## 2025-02-22 RX ADMIN — ENOXAPARIN SODIUM 40 MILLIGRAM(S): 100 INJECTION SUBCUTANEOUS at 06:20

## 2025-02-22 RX ADMIN — POLYETHYLENE GLYCOL 3350 17 GRAM(S): 17 POWDER, FOR SOLUTION ORAL at 06:19

## 2025-02-22 RX ADMIN — Medication 975 MILLIGRAM(S): at 17:26

## 2025-02-22 RX ADMIN — Medication 975 MILLIGRAM(S): at 22:23

## 2025-02-22 RX ADMIN — METOPROLOL SUCCINATE 25 MILLIGRAM(S): 50 TABLET, EXTENDED RELEASE ORAL at 06:20

## 2025-02-22 RX ADMIN — Medication 975 MILLIGRAM(S): at 00:01

## 2025-02-22 RX ADMIN — METOPROLOL SUCCINATE 25 MILLIGRAM(S): 50 TABLET, EXTENDED RELEASE ORAL at 17:26

## 2025-02-22 RX ADMIN — Medication 975 MILLIGRAM(S): at 22:09

## 2025-02-22 RX ADMIN — Medication 100 MILLIGRAM(S): at 17:27

## 2025-02-22 RX ADMIN — POLYETHYLENE GLYCOL 3350 17 GRAM(S): 17 POWDER, FOR SOLUTION ORAL at 17:25

## 2025-02-22 NOTE — PROGRESS NOTE ADULT - ASSESSMENT
79 year old man with PMHx rectal cancer s/p neoadjuvant chemo and APR (Dr. Ruiz 2010), throat cancer s/p chemo/radiation, and perforated gastric ulcer s/p Kash patch with falciform (Dr. Akhtar, 2024) admitted on 2/14/25 for closed loop SBO .  Patient underwent ex lap with open JOSELUIS on same day and post op course c/b ileus.  Patient had NGT placed (removed 2/17) and still with no output from ostomy.  TPN team consulted given insufficient enteral intake.  Patient with history of TPN 8/24-11/2024.     GOAL PN: 120g amino acids, 210 g dextrose,65 g SMOF lipid; to provide 1844 kcal/day (29 kcal/kg and 1.9 g/kg protein based on dosing wt 63.5 kg)  Non-Protein Calories: 1364 kcal/day (21 kcal/kg, based on dosing wt 63.5 kg); Dextrose Infusion Rate: 2.3 mg/kg/min (24-hour infusion); Lipid Infusion Rate: 0.98 g/kg; 0.08 g/kg/hr (12-hour infusion)      - Nutritional Assessment, patient with severe protein calorie malnutrition not meeting nutritional goals enterally due to ileus/NPO status and would likely benefit from TPN for nutritional support; prealbumin only 8 mg/dL on 2/19 - trend weekly   - TPN plan:  Discussed TPN initiation with pros/cons; patient would like to hold off on TPN today and is hoping tolerance of CLD today  - TPN access:  Patient will need a dedicated central line if/once TPN approved  - Anemia:  check iron studies, B12, Folate levels  - Electrolyte Imbalance risk:  recommend to check CMP, Mg, Phos and ionized Ca daily, to be supplemented peripherally per primary team.  - Ileus:  monitor ostomy output overnight, CT a/p, f/u surgical plan and nutrition parameters in AM  - IV fluids per primary team; IV fluids per primary team; can add MVI and Thiamine IV to help decrease risk of refeeding syndrome once nutrition, whether TPN vs enteral, resumes   - Recommend strict intake and output/weight checks three times a week  - Hypoglycemia:  HgA1c 5.1%; finger sticks every 6 hours to monitor glucose trend  - Risk for Hypertriglyceridemia with TPN:  plan to check baseline lipid profile in AM and monitor TG closely once/if TPN initiated   - Global care per primary team    Available on TEAMS  TPN spectra [id number] ([phone] when dialing from outside line)  M-F 8A-2P, Weekends and holidays 8/9A-12/1P  Discussed with Dr. Gerardo Silverio   79 year old man with PMHx rectal cancer s/p neoadjuvant chemo and APR (Dr. Ruiz 2010), throat cancer s/p chemo/radiation, and perforated gastric ulcer s/p Kash patch with falciform (Dr. Akhtar, 2024) admitted on 2/14/25 for closed loop SBO .  Patient underwent ex lap with open JOSELUIS on same day and post op course c/b ileus.  Patient had NGT placed (removed 2/17) and still with no output from ostomy.  TPN team consulted given insufficient enteral intake.  Patient with history of TPN 8/24-11/2024.     GOAL PN: 120g amino acids, 210 g dextrose,65 g SMOF lipid; to provide 1844 kcal/day (29 kcal/kg and 1.9 g/kg protein based on dosing wt 63.5 kg)  Non-Protein Calories: 1364 kcal/day (21 kcal/kg, based on dosing wt 63.5 kg); Dextrose Infusion Rate: 2.3 mg/kg/min (24-hour infusion); Lipid Infusion Rate: 0.98 g/kg; 0.08 g/kg/hr (12-hour infusion)      - Nutritional Assessment, patient with severe protein calorie malnutrition not meeting nutritional goals enterally due to ileus/NPO status and would likely benefit from TPN for nutritional support; prealbumin only 8 mg/dL on 2/19 - trend weekly   - TPN plan:  Discussed TPN initiation with pros/cons; patient would like to hold off on TPN today -->- TPN was consulted as patient has not eaten for over 7 days and very malnourished prior to surgery, patient and family refusing TPN at this time and wanting to wait. Pt tolerating clears presently.  - TPN access:  Patient will need a dedicated central line if/once TPN approved  - Anemia:  check iron studies, B12, Folate levels  - Electrolyte Imbalance risk:  recommend to check CMP, Mg, Phos and ionized Ca daily, to be supplemented peripherally per primary team.  - Ileus:  monitor ostomy output overnight, CT a/p, f/u surgical plan and nutrition parameters in AM  - IV fluids per primary team; IV fluids per primary team; can add MVI and Thiamine IV to help decrease risk of refeeding syndrome once nutrition, whether TPN vs enteral, resumes   - Recommend strict intake and output/weight checks three times a week  - Hypoglycemia:  HgA1c 5.1%; finger sticks every 6 hours to monitor glucose trend  - Risk for Hypertriglyceridemia with TPN:  plan to check baseline lipid profile in AM and monitor TG closely once/if TPN initiated   - Global care per primary team    Available on TEAMS  TPN spectra [id number] ([phone] when dialing from outside line)  M-F 8A-2P, Weekends and holidays 8/9A-12/1P  Discussed with Dr. Gerardo Silverio   79 year old man with PMHx rectal cancer s/p neoadjuvant chemo and APR (Dr. Ruiz 2010), throat cancer s/p chemo/radiation, and perforated gastric ulcer s/p Kash patch with falciform (Dr. Akhtar, 2024) admitted on 2/14/25 for closed loop SBO . Patient underwent ex lap with open JOSELUIS on same day and post op course c/b ileus.  Patient had NGT placed (removed 2/17) and still with no output from ostomy.  TPN team consulted given insufficient enteral intake.  Patient with history of TPN 8/24-11/2024.     GOAL PN: 120g amino acids, 210 g dextrose,65 g SMOF lipid; to provide 1844 kcal/day (29 kcal/kg and 1.9 g/kg protein based on dosing wt 63.5 kg)  Non-Protein Calories: 1364 kcal/day (21 kcal/kg, based on dosing wt 63.5 kg); Dextrose Infusion Rate: 2.3 mg/kg/min (24-hour infusion); Lipid Infusion Rate: 0.98 g/kg; 0.08 g/kg/hr (12-hour infusion)      - Nutritional Assessment, patient with severe protein calorie malnutrition not meeting nutritional goals enterally due to ileus/NPO status and would likely benefit from TPN for nutritional support; prealbumin only 8 mg/dL on 2/19 - trend weekly   - TPN plan:  Discussed TPN initiation with pros/cons; patient would like to hold off on TPN today -->- TPN was consulted as patient has not eaten for over 7 days and very malnourished prior to surgery, patient and family refusing TPN at this time and wanting to wait. Pt tolerating clears presently.  - TPN access:  Patient will need a dedicated central line if/once TPN approved  - Anemia:  check iron studies, B12, Folate levels  - Electrolyte Imbalance risk:  recommend to check CMP, Mg, Phos and ionized Ca daily, to be supplemented peripherally per primary team.  - Ileus:  monitor ostomy output overnight, CT a/p, f/u surgical plan and nutrition parameters in AM  - IV fluids per primary team; IV fluids per primary team; can add MVI and Thiamine IV to help decrease risk of refeeding syndrome once nutrition, whether TPN vs enteral, resumes   - Recommend strict intake and output/weight checks three times a week  - Hypoglycemia:  HgA1c 5.1%; finger sticks every 6 hours to monitor glucose trend  - Risk for Hypertriglyceridemia with TPN:  plan to check baseline lipid profile in AM and monitor TG closely once/if TPN initiated   - Further care per Acute Care Surgery team, pager [phone].      Available on TEAMS  TPN spectra [id number] ([phone] when dialing from outside line)  M-F 8A-2P, Weekends and holidays 8/9A-12/1P  Discussed with Dr. Gerardo Silverio

## 2025-02-22 NOTE — PROGRESS NOTE ADULT - SUBJECTIVE AND OBJECTIVE BOX
SURGERY DAILY PROGRESS NOTE     Patient is a 79y old  Male who presents with a chief complaint of closed loop SBO (21 Feb 2025 17:12)        24 HOUR EVENTS:     OVERNIGHT EVENTS: Ostomy appliance leaking, changed overnight.       SUBJECTIVE:   Patient seen and examined at bedside with surgical team, patient without complaints.       OBJECTIVE     Vital Signs Last 24 Hrs  T(C): 36.7 (22 Feb 2025 00:30), Max: 36.8 (21 Feb 2025 08:42)  T(F): 98 (22 Feb 2025 00:30), Max: 98.2 (21 Feb 2025 08:42)  HR: 58 (22 Feb 2025 00:30) (58 - 76)  BP: 145/79 (22 Feb 2025 00:30) (131/80 - 168/99)  BP(mean): --  RR: 18 (22 Feb 2025 00:30) (18 - 18)  SpO2: 97% (22 Feb 2025 00:30) (97% - 99%)    Parameters below as of 22 Feb 2025 00:30  Patient On (Oxygen Delivery Method): room air    I&O's Detail    20 Feb 2025 07:01  -  21 Feb 2025 07:00  --------------------------------------------------------  IN:    dextrose 5% + sodium chloride 0.9%: 2400 mL    IV PiggyBack: 200 mL  Total IN: 2600 mL    OUT:    Colostomy (mL): 0 mL    Oral Fluid: 0 mL    Voided (mL): 1050 mL  Total OUT: 1050 mL    Total NET: 1550 mL      21 Feb 2025 07:01  -  22 Feb 2025 02:49  --------------------------------------------------------  IN:    Oral Fluid: 240 mL  Total IN: 240 mL    OUT:    Colostomy (mL): 0 mL    Voided (mL): 650 mL  Total OUT: 650 mL    Total NET: -410 mL          Physical Exam  Constitutional: Well developed, well nourished, NAD  Chest: Symmetric chest rise bilaterally, no increased WOB  Abdomen: Soft, nondistended, appropriate sx incisional tenderness with midline incision c/d/i. Colostomy pink with hard stool by stoma and bowel sweat.   Extremities: Warm to touch, soft, sensory and motor intact. No cyanosis/erythema/edema/hematoma            Medications  MEDICATIONS  (STANDING):  acetaminophen     Tablet .. 975 milliGRAM(s) Oral every 6 hours  benzocaine 20% Spray 1 Spray(s) Topical once  dextrose 5% + sodium chloride 0.9%. 1000 milliLiter(s) (50 mL/Hr) IV Continuous <Continuous>  dextrose 50% Injectable 12.5 Gram(s) IV Push once  dextrose 50% Injectable 12.5 Gram(s) IV Push once  enoxaparin Injectable 40 milliGRAM(s) SubCutaneous every 24 hours  influenza  Vaccine (HIGH DOSE) 0.5 milliLiter(s) IntraMuscular once  metoprolol tartrate 25 milliGRAM(s) Oral two times a day  multivitamin 1 Tablet(s) Oral daily  naloxegol 25 milliGRAM(s) Oral daily  polyethylene glycol 3350 17 Gram(s) Oral every 12 hours  thiamine 100 milliGRAM(s) Oral every 24 hours    MEDICATIONS  (PRN):  nalbuphine Injectable 2.5 milliGRAM(s) IV Push every 6 hours PRN Pruritus  naloxone Injectable 0.1 milliGRAM(s) IV Push every 3 minutes PRN For ANY of the following changes in patient status:  A. RR LESS THAN 10 breaths per minute, B. Oxygen saturation LESS THAN 90%, C. Sedation score of 6  oxyCODONE    IR 2.5 milliGRAM(s) Oral every 4 hours PRN Moderate Pain (4 - 6)  oxyCODONE    IR 5 milliGRAM(s) Oral every 4 hours PRN Severe Pain (7 - 10)        LABS:                        12.2   5.22  )-----------( 233      ( 21 Feb 2025 07:10 )             36.7     02-21    141  |  108  |  8   ----------------------------<  106[H]  3.7   |  22  |  0.82    Ca    8.8      21 Feb 2025 07:10  Phos  2.7     02-21  Mg     1.9     02-21      PT/INR - ( 20 Feb 2025 06:03 )   PT: 23.2 sec;   INR: 2.03 ratio         PTT - ( 20 Feb 2025 06:03 )  PTT:42.4 sec    Urinalysis Basic - ( 21 Feb 2025 07:10 )    Color: x / Appearance: x / SG: x / pH: x  Gluc: 106 mg/dL / Ketone: x  / Bili: x / Urobili: x   Blood: x / Protein: x / Nitrite: x   Leuk Esterase: x / RBC: x / WBC x   Sq Epi: x / Non Sq Epi: x / Bacteria: x       SURGERY DAILY PROGRESS NOTE     Patient is a 79y old  Male who presents with a chief complaint of closed loop SBO (21 Feb 2025 17:12)    24 HOUR EVENTS:     OVERNIGHT EVENTS: Ostomy appliance leaking, changed overnight. Ostomy enema deferred.    SUBJECTIVE:   Patient seen and examined at bedside with surgical team. The patient is resting comfortably in bed, no complaints of pain. Tolerating clears. Ostomy w/ hard stool. Encouraged to ambulate. Denies fever, chills, nausea, vomiting, diarrhea, chest pain, and SOB.       OBJECTIVE     Vital Signs Last 24 Hrs  T(C): 36.7 (22 Feb 2025 00:30), Max: 36.8 (21 Feb 2025 08:42)  T(F): 98 (22 Feb 2025 00:30), Max: 98.2 (21 Feb 2025 08:42)  HR: 58 (22 Feb 2025 00:30) (58 - 76)  BP: 145/79 (22 Feb 2025 00:30) (131/80 - 168/99)  BP(mean): --  RR: 18 (22 Feb 2025 00:30) (18 - 18)  SpO2: 97% (22 Feb 2025 00:30) (97% - 99%)    Parameters below as of 22 Feb 2025 00:30  Patient On (Oxygen Delivery Method): room air    I&O's Detail    20 Feb 2025 07:01  -  21 Feb 2025 07:00  --------------------------------------------------------  IN:    dextrose 5% + sodium chloride 0.9%: 2400 mL    IV PiggyBack: 200 mL  Total IN: 2600 mL    OUT:    Colostomy (mL): 0 mL    Oral Fluid: 0 mL    Voided (mL): 1050 mL  Total OUT: 1050 mL    Total NET: 1550 mL      21 Feb 2025 07:01  -  22 Feb 2025 02:49  --------------------------------------------------------  IN:    Oral Fluid: 240 mL  Total IN: 240 mL    OUT:    Colostomy (mL): 0 mL    Voided (mL): 650 mL  Total OUT: 650 mL    Total NET: -410 mL          Physical Exam  Constitutional: Well developed, well nourished, NAD  Chest: Symmetric chest rise bilaterally, no increased WOB  Abdomen: Soft, nondistended, appropriate sx incisional tenderness with midline incision c/d/i. Colostomy pink with hard stool by stoma and bowel sweat.   Extremities: Warm to touch, soft, sensory and motor intact. No cyanosis/erythema/edema/hematoma            Medications  MEDICATIONS  (STANDING):  acetaminophen     Tablet .. 975 milliGRAM(s) Oral every 6 hours  benzocaine 20% Spray 1 Spray(s) Topical once  dextrose 5% + sodium chloride 0.9%. 1000 milliLiter(s) (50 mL/Hr) IV Continuous <Continuous>  dextrose 50% Injectable 12.5 Gram(s) IV Push once  dextrose 50% Injectable 12.5 Gram(s) IV Push once  enoxaparin Injectable 40 milliGRAM(s) SubCutaneous every 24 hours  influenza  Vaccine (HIGH DOSE) 0.5 milliLiter(s) IntraMuscular once  metoprolol tartrate 25 milliGRAM(s) Oral two times a day  multivitamin 1 Tablet(s) Oral daily  naloxegol 25 milliGRAM(s) Oral daily  polyethylene glycol 3350 17 Gram(s) Oral every 12 hours  thiamine 100 milliGRAM(s) Oral every 24 hours    MEDICATIONS  (PRN):  nalbuphine Injectable 2.5 milliGRAM(s) IV Push every 6 hours PRN Pruritus  naloxone Injectable 0.1 milliGRAM(s) IV Push every 3 minutes PRN For ANY of the following changes in patient status:  A. RR LESS THAN 10 breaths per minute, B. Oxygen saturation LESS THAN 90%, C. Sedation score of 6  oxyCODONE    IR 2.5 milliGRAM(s) Oral every 4 hours PRN Moderate Pain (4 - 6)  oxyCODONE    IR 5 milliGRAM(s) Oral every 4 hours PRN Severe Pain (7 - 10)        LABS:                        12.2   5.22  )-----------( 233      ( 21 Feb 2025 07:10 )             36.7     02-21    141  |  108  |  8   ----------------------------<  106[H]  3.7   |  22  |  0.82    Ca    8.8      21 Feb 2025 07:10  Phos  2.7     02-21  Mg     1.9     02-21      PT/INR - ( 20 Feb 2025 06:03 )   PT: 23.2 sec;   INR: 2.03 ratio         PTT - ( 20 Feb 2025 06:03 )  PTT:42.4 sec    Urinalysis Basic - ( 21 Feb 2025 07:10 )    Color: x / Appearance: x / SG: x / pH: x  Gluc: 106 mg/dL / Ketone: x  / Bili: x / Urobili: x   Blood: x / Protein: x / Nitrite: x   Leuk Esterase: x / RBC: x / WBC x   Sq Epi: x / Non Sq Epi: x / Bacteria: x

## 2025-02-22 NOTE — PROGRESS NOTE ADULT - NUTRITIONAL ASSESSMENT
This patient has been assessed with a concern for Malnutrition and has been determined to have a diagnosis/diagnoses of Severe protein-calorie malnutrition.    This patient is being managed with:   Diet NPO-  Except Medications  With Ice Chips/Sips of Water  Entered: Feb 20 2025  1:24PM  
This patient has been assessed with a concern for Malnutrition and has been determined to have a diagnosis/diagnoses of Severe protein-calorie malnutrition.    This patient is being managed with:   Diet Clear Liquid-  2000mL Fluid Restriction ([id number])  Entered: Feb 21 2025  8:27AM  
This patient has been assessed with a concern for Malnutrition and has been determined to have a diagnosis/diagnoses of Severe protein-calorie malnutrition.    This patient is being managed with:   Diet NPO after Midnight-     NPO Start Date: 19-Feb-2025   NPO Start Time: 23:59  Except Medications  Entered: Feb 19 2025  6:07PM    Diet NPO-  Except Medications  With Ice Chips/Sips of Water     Special Instructions for Nursing:  Except Medications  Entered: Feb 19 2025  6:06PM  
This patient has been assessed with a concern for Malnutrition and has been determined to have a diagnosis/diagnoses of Severe protein-calorie malnutrition.    This patient is being managed with:   Diet NPO-  Except Medications  With Ice Chips/Sips of Water     Special Instructions for Nursing:  Except Medications  Entered: Feb 18 2025 11:45AM

## 2025-02-22 NOTE — PROGRESS NOTE ADULT - ASSESSMENT
79M with PMHx rectal cancer s/p neoadjuvant chemo and APR (Dr. Hogan 2010), throat cancer s/p chemo/radiation, and perforated gastric ulcer s/p Kash patch with falciform (Dr. Akhtar, 2024), presenting with closed loop bowel obstruction. He is now s/p ex-lap and open JOSELUIS on 2/14/25. Hospital course c/b low colostomy output. CT abd/pelvis (2/19/25) significant for narrowed segment of small bowel and several areas of fluid collection that are not rim enhancing (not well formed abscesses). AXR (2/20/25) with contrast in colon    PLAN:  - Monitor ostomy function  - Pain control: IV Tylenol ATC, IV Dilaudid PRN  - Diet: CLD  - Hold off on IR consult for collections as they are small and pt afebrile without leukocytosis  - TPN was consulted as patient has not eaten for over 7 days and very malnourished prior to surgery, patient and family refusing TPN at this time and wanting to wait.  - OOB and ambulating as tolerated  - DVT ppx: LVX  - PT: NPT      ACS/Trauma Surgery  [id number], [phone]    79M with PMHx rectal cancer s/p neoadjuvant chemo and APR (Dr. Hogan 2010), throat cancer s/p chemo/radiation, and perforated gastric ulcer s/p Kash patch with falciform (Dr. Akhtar, 2024), presenting with closed loop bowel obstruction. He is now s/p ex-lap and open JOSELUIS on 2/14/25. Hospital course c/b low colostomy output. CT abd/pelvis (2/19/25) significant for narrowed segment of small bowel and several areas of fluid collection that are not rim enhancing (not well formed abscesses). AXR (2/20/25) with contrast in colon    PLAN:  - Monitor ostomy function  - Pain control: IV Tylenol ATC, IV Dilaudid PRN  - Diet: Reg  - Bowel Reg  - Hold off on IR consult for collections as they are small and pt afebrile without leukocytosis  - TPN was consulted as patient has not eaten for over 7 days and very malnourished prior to surgery, patient and family refusing TPN at this time and wanting to wait.  - OOB and ambulating as tolerated  - DVT ppx: LVX  - PT: NPT  - Dispo planning    ACS/Trauma Surgery  [id number]

## 2025-02-22 NOTE — ADVANCED PRACTICE NURSE CONSULT - ASSESSMENT
Charts reviewed and events noted to date. In to see patient for continued ostomy education - check - in. Pouching system leaked over night.   On assessment - pouching system in tact, wearing # [id number] - colostomy pink with hard stool by stoma. Patient states that his pouch leaked over night but was changed by Dr. Macedo.   Engaged in active listening, and acknowledged patient's feelings and provided emotional support.   Will continue to follow negative...

## 2025-02-22 NOTE — ADVANCED PRACTICE NURSE CONSULT - RECOMMEDATIONS
Emotional support and encouragement provided throughout visit.   Will recommend:  1. Monitor output  2. Empty pouch or "dispose" of pouch & wipe stoma  when 1/3-1/2 full  3. Change pouching system every 3-4 days & prn leakage  4. Reinforce ostomy teaching w/patient  5. Contact ostomy specialists if questions/issues arise.  6. Supplies: Augustus 1 3/4" Ceraplus convex skin barrier (#74946), [place] 1 3/4" drainable pouch (#19623); Accessory products:  stoma paste #[id number], stoma powder (#4329) & Cavilon No sting barrier film wipe (#0750), stoma belt #6629      
· Recommendations	  Emotional support and encouragement provided throughout visit.   Will recommend:  1. Monitor output  2. Empty pouch or "dispose" of pouch & wipe stoma  when 1/3-1/2 full  3. Change pouching system every 3-4 days & prn leakage  4. Reinforce ostomy teaching w/patient  5. Contact CWOCNs if questions/issues arise.  6. Supplies: [place] 1 3/4" Ceraplus convex skin barrier (#23613), Augustus 1 3/4" drainable pouch (#15971); Accessory products:  stoma paste #[id number], stoma powder (#1987) & Cavilon No sting barrier film wipe (#6434), stoma belt #6837      
Emotional support and encouragement provided throughout visit.   Will recommend:  1. Monitor output  2. Empty pouch when 1/3-1/2 full  3. Change pouching system every 3-4 days & prn leakage  4. Reinforce ostomy teaching w/patient  5. Contact CWOCNs if questions/issues arise.  6. Supplies: Augustus 1 3/4" Ceraplus convex skin barrier (#28580), [place] 1 3/4" drainable pouch (#00658); Accessory products:  stoma paste #[id number], stoma powder (#7605) & Cavilon No sting barrier film wipe (#2360), stoma belt #4940  
Emotional support and encouragement provided throughout visit.   Will recommend:  1. Monitor output  2. Empty pouch or "dispose" of pouch & wipe stoma  when 1/3-1/2 full  3. Change pouching system every 3-4 days & prn leakage  4. Reinforce ostomy teaching w/patient  5. Contact [facility]OCNs if questions/issues arise.  6. Supplies: Augustus 1 3/4" Ceraplus convex skin barrier (#89475), Augustus 1 3/4" drainable pouch (#70294); Accessory products:  stoma paste #[id number], stoma powder (#9344) & Cavilon No sting barrier film wipe (#9206), stoma belt #1593  
Recommendations     1. Monitor output  2. Empty pouch or "dispose" of pouch & wipe stoma  when 1/3-1/2 full  3. Change pouching system every 3-4 days & prn leakage  4. Reinforce ostomy teaching w/patient  5. Contact ostomy specialists if questions/issues arise.  6. Supplies: Augustus 1 3/4" Cera plus convex skin barrier (#74334), [place] 1 3/4" drainable pouch (#85774); Accessory products:  stoma paste #[id number], stoma powder (#2400) & Cavilon No sting barrier film wipe (#6046), stoma belt #7228

## 2025-02-22 NOTE — PROGRESS NOTE ADULT - SUBJECTIVE AND OBJECTIVE BOX
Subjective: Patient seen and examined. No new events except as noted.     SUBJECTIVE/ROS:  feels better  No chest pain, dyspnea, palpitation, or dizziness.       MEDICATIONS:  MEDICATIONS  (STANDING):  acetaminophen     Tablet .. 975 milliGRAM(s) Oral every 6 hours  benzocaine 20% Spray 1 Spray(s) Topical once  dextrose 50% Injectable 12.5 Gram(s) IV Push once  dextrose 50% Injectable 12.5 Gram(s) IV Push once  enoxaparin Injectable 40 milliGRAM(s) SubCutaneous every 24 hours  influenza  Vaccine (HIGH DOSE) 0.5 milliLiter(s) IntraMuscular once  metoprolol tartrate 25 milliGRAM(s) Oral two times a day  multivitamin 1 Tablet(s) Oral daily  naloxegol 25 milliGRAM(s) Oral daily  polyethylene glycol 3350 17 Gram(s) Oral every 12 hours  senna 1 Tablet(s) Oral daily  thiamine 100 milliGRAM(s) Oral every 24 hours      PHYSICAL EXAM:  T(C): 36.8 (02-22-25 @ 08:24), Max: 36.8 (02-21-25 @ 17:07)  HR: 62 (02-22-25 @ 08:24) (58 - 76)  BP: 116/66 (02-22-25 @ 08:24) (116/66 - 168/99)  RR: 18 (02-22-25 @ 08:24) (18 - 18)  SpO2: 94% (02-22-25 @ 08:24) (94% - 99%)  Wt(kg): --  I&O's Summary    21 Feb 2025 07:01  -  22 Feb 2025 07:00  --------------------------------------------------------  IN: 240 mL / OUT: 650 mL / NET: -410 mL    22 Feb 2025 07:01  -  22 Feb 2025 08:47  --------------------------------------------------------  IN: 0 mL / OUT: 200 mL / NET: -200 mL            JVP: Normal  Neck: supple  Lung: clear   CV: S1 S2 , Murmur:  Abd: soft  Ext: No edema  neuro: Awake / alert  Psych: flat affect  Skin: normal``    LABS/DATA:    CARDIAC MARKERS:                                12.2   5.22  )-----------( 233      ( 21 Feb 2025 07:10 )             36.7     02-21    141  |  108  |  8   ----------------------------<  106[H]  3.7   |  22  |  0.82    Ca    8.8      21 Feb 2025 07:10  Phos  2.7     02-21  Mg     1.9     02-21      proBNP:   Lipid Profile:   HgA1c:   TSH:     TELE:  EKG:

## 2025-02-22 NOTE — PROGRESS NOTE ADULT - SUBJECTIVE AND OBJECTIVE BOX
[facility] NUTRITION SUPPORT-- FOLLOW UP NOTE      24 hour events/subjective:      Diet:  Diet, Clear Liquid:   2000mL Fluid Restriction ([id number]) (02-21-25 @ 08:27)      PAST HISTORY  --------------------------------------------------------------------------------  No significant changes to PMH, PSH, FHx, SHx, unless otherwise noted    ALLERGIES & MEDICATIONS  --------------------------------------------------------------------------------  Allergies    No Known Allergies    Intolerances      Standing Inpatient Medications  acetaminophen     Tablet .. 975 milliGRAM(s) Oral every 6 hours  benzocaine 20% Spray 1 Spray(s) Topical once  dextrose 50% Injectable 12.5 Gram(s) IV Push once  dextrose 50% Injectable 12.5 Gram(s) IV Push once  enoxaparin Injectable 40 milliGRAM(s) SubCutaneous every 24 hours  influenza  Vaccine (HIGH DOSE) 0.5 milliLiter(s) IntraMuscular once  metoprolol tartrate 25 milliGRAM(s) Oral two times a day  multivitamin 1 Tablet(s) Oral daily  naloxegol 25 milliGRAM(s) Oral daily  polyethylene glycol 3350 17 Gram(s) Oral every 12 hours  senna 1 Tablet(s) Oral daily  thiamine 100 milliGRAM(s) Oral every 24 hours    PRN Inpatient Medications  nalbuphine Injectable 2.5 milliGRAM(s) IV Push every 6 hours PRN  naloxone Injectable 0.1 milliGRAM(s) IV Push every 3 minutes PRN  oxyCODONE    IR 2.5 milliGRAM(s) Oral every 4 hours PRN  oxyCODONE    IR 5 milliGRAM(s) Oral every 4 hours PRN      REVIEW OF SYSTEMS  --------------------------------------------------------------------------------  Gen: as per HPI  Skin: No rashes  Head/Eyes/Ears/Mouth: No headache; No sore throat  Respiratory: No dyspnea, cough,   CV: No chest pain, PND, orthopnea  GI: as per HPI  : No increased frequency, dysuria, hematuria, nocturia  MSK: No joint pain/swelling; no back pain; no edema  Neuro: No dizziness/lightheadedness, weakness, seizures, numbness, tingling  Psych: No significant nervousness, anxiety, stress, depression    All other systems were reviewed and are negative, except as noted.        LABS/STUDIES  --------------------------------------------------------------------------------              12.2   5.22  >-----------<  233      [02-21-25 @ 07:10]              36.7     141  |  108  |  8   ----------------------------<  106      [02-21-25 @ 07:10]  3.7   |  22  |  0.82        Ca     8.8     [02-21-25 @ 07:10]      Mg     1.9     [02-21-25 @ 07:10]      Phos  2.7     [02-21-25 @ 07:10]                  Prealbumin, Serum: 8 mg/dL (02-19-25 @ 07:14)    Triglycerides      CAPILLARY BLOOD GLUCOSE      POCT Blood Glucose.: 85 mg/dL (22 Feb 2025 06:21)  POCT Blood Glucose.: 89 mg/dL (22 Feb 2025 00:18)      VITALS/PHYSICAL EXAM  --------------------------------------------------------------------------------  T(C): 36.8 (02-22-25 @ 08:24), Max: 36.8 (02-21-25 @ 17:07)  HR: 62 (02-22-25 @ 08:24) (58 - 76)  BP: 116/66 (02-22-25 @ 08:24) (116/66 - 168/99)  RR: 18 (02-22-25 @ 08:24) (18 - 18)  SpO2: 94% (02-22-25 @ 08:24) (94% - 99%)  Wt(kg): --        02-21-25 @ 07:01  -  02-22-25 @ 07:00  --------------------------------------------------------  IN: 240 mL / OUT: 650 mL / NET: -410 mL    02-22-25 @ 07:01  -  02-22-25 @ 10:32  --------------------------------------------------------  IN: 0 mL / OUT: 200 mL / NET: -200 mL      Physical Exam:    Gen: NAD, well-appearing  HEENT: PERRL, NCAT  Neck: trachea midline no noted JVD  Chest: non labored breathing equal chest expansion b/l  Abd: Soft and nontender. No ostomy output. Ostomy pink and viable.   UE: WWP no c/c/e  LE: WWP no c/c/e  Neuro: AOx3  Psych: Normal affect and mood  Skin: Warm, without rashes  .  .  .   [facility] NUTRITION SUPPORT-- FOLLOW UP NOTE      24 hour events/subjective:      TPN consulted for Protein Calorie Malnutrition in the setting of nutritional optimization for anticipated minimal PO intake, pt  w/ h/o TPN 8-11/2024 for post op ileus. Pt remains off of TPN as per pt request.      Diet:  Diet, Clear Liquid:   2000mL Fluid Restriction ([id number]) (02-21-25 @ 08:27)      PAST HISTORY  --------------------------------------------------------------------------------  No significant changes to PMH, PSH, FHx, SHx, unless otherwise noted    ALLERGIES & MEDICATIONS  --------------------------------------------------------------------------------  Allergies    No Known Allergies    Intolerances      Standing Inpatient Medications  acetaminophen     Tablet .. 975 milliGRAM(s) Oral every 6 hours  benzocaine 20% Spray 1 Spray(s) Topical once  dextrose 50% Injectable 12.5 Gram(s) IV Push once  dextrose 50% Injectable 12.5 Gram(s) IV Push once  enoxaparin Injectable 40 milliGRAM(s) SubCutaneous every 24 hours  influenza  Vaccine (HIGH DOSE) 0.5 milliLiter(s) IntraMuscular once  metoprolol tartrate 25 milliGRAM(s) Oral two times a day  multivitamin 1 Tablet(s) Oral daily  naloxegol 25 milliGRAM(s) Oral daily  polyethylene glycol 3350 17 Gram(s) Oral every 12 hours  senna 1 Tablet(s) Oral daily  thiamine 100 milliGRAM(s) Oral every 24 hours    PRN Inpatient Medications  nalbuphine Injectable 2.5 milliGRAM(s) IV Push every 6 hours PRN  naloxone Injectable 0.1 milliGRAM(s) IV Push every 3 minutes PRN  oxyCODONE    IR 2.5 milliGRAM(s) Oral every 4 hours PRN  oxyCODONE    IR 5 milliGRAM(s) Oral every 4 hours PRN      REVIEW OF SYSTEMS  --------------------------------------------------------------------------------  Gen: as per HPI  Skin: No rashes  Head/Eyes/Ears/Mouth: No headache; No sore throat  Respiratory: No dyspnea, cough,   CV: No chest pain, PND, orthopnea  GI: as per HPI  : No increased frequency, dysuria, hematuria, nocturia  MSK: No joint pain/swelling; no back pain; no edema  Neuro: No dizziness/lightheadedness, weakness, seizures, numbness, tingling  Psych: No significant nervousness, anxiety, stress, depression    All other systems were reviewed and are negative, except as noted.        LABS/STUDIES  --------------------------------------------------------------------------------              12.2   5.22  >-----------<  233      [02-21-25 @ 07:10]              36.7     141  |  108  |  8   ----------------------------<  106      [02-21-25 @ 07:10]  3.7   |  22  |  0.82        Ca     8.8     [02-21-25 @ 07:10]      Mg     1.9     [02-21-25 @ 07:10]      Phos  2.7     [02-21-25 @ 07:10]                  Prealbumin, Serum: 8 mg/dL (02-19-25 @ 07:14)    Triglycerides      CAPILLARY BLOOD GLUCOSE      POCT Blood Glucose.: 85 mg/dL (22 Feb 2025 06:21)  POCT Blood Glucose.: 89 mg/dL (22 Feb 2025 00:18)      VITALS/PHYSICAL EXAM  --------------------------------------------------------------------------------  T(C): 36.8 (02-22-25 @ 08:24), Max: 36.8 (02-21-25 @ 17:07)  HR: 62 (02-22-25 @ 08:24) (58 - 76)  BP: 116/66 (02-22-25 @ 08:24) (116/66 - 168/99)  RR: 18 (02-22-25 @ 08:24) (18 - 18)  SpO2: 94% (02-22-25 @ 08:24) (94% - 99%)  Wt(kg): --        02-21-25 @ 07:01  -  02-22-25 @ 07:00  --------------------------------------------------------  IN: 240 mL / OUT: 650 mL / NET: -410 mL    02-22-25 @ 07:01  -  02-22-25 @ 10:32  --------------------------------------------------------  IN: 0 mL / OUT: 200 mL / NET: -200 mL      Physical Exam:    Gen: NAD, well-appearing  HEENT: PERRL, NCAT  Neck: trachea midline no noted JVD  Chest: non labored breathing equal chest expansion b/l  Abd: Soft and nontender. No ostomy output. Ostomy pink and viable.   UE: WWP no c/c/e  LE: WWP no c/c/e  Neuro: AOx3  Psych: Normal affect and mood  Skin: Warm, without rashes  .  .  .

## 2025-02-23 VITALS
RESPIRATION RATE: 18 BRPM | OXYGEN SATURATION: 92 % | SYSTOLIC BLOOD PRESSURE: 101 MMHG | HEART RATE: 62 BPM | DIASTOLIC BLOOD PRESSURE: 65 MMHG | TEMPERATURE: 98 F

## 2025-02-23 LAB
ANION GAP SERPL CALC-SCNC: 14 MMOL/L — SIGNIFICANT CHANGE UP (ref 5–17)
BUN SERPL-MCNC: 14 MG/DL — SIGNIFICANT CHANGE UP (ref 7–23)
CALCIUM SERPL-MCNC: 8.9 MG/DL — SIGNIFICANT CHANGE UP (ref 8.4–10.5)
CHLORIDE SERPL-SCNC: 105 MMOL/L — SIGNIFICANT CHANGE UP (ref 96–108)
CO2 SERPL-SCNC: 19 MMOL/L — LOW (ref 22–31)
CREAT SERPL-MCNC: 0.96 MG/DL — SIGNIFICANT CHANGE UP (ref 0.5–1.3)
EGFR: 81 ML/MIN/1.73M2 — SIGNIFICANT CHANGE UP
GLUCOSE BLDC GLUCOMTR-MCNC: 81 MG/DL — SIGNIFICANT CHANGE UP (ref 70–99)
GLUCOSE SERPL-MCNC: 78 MG/DL — SIGNIFICANT CHANGE UP (ref 70–99)
HCT VFR BLD CALC: 36.1 % — LOW (ref 39–50)
HGB BLD-MCNC: 12.1 G/DL — LOW (ref 13–17)
MAGNESIUM SERPL-MCNC: 1.9 MG/DL — SIGNIFICANT CHANGE UP (ref 1.6–2.6)
MCHC RBC-ENTMCNC: 30.7 PG — SIGNIFICANT CHANGE UP (ref 27–34)
MCHC RBC-ENTMCNC: 33.5 G/DL — SIGNIFICANT CHANGE UP (ref 32–36)
MCV RBC AUTO: 91.6 FL — SIGNIFICANT CHANGE UP (ref 80–100)
NRBC BLD AUTO-RTO: 0 /100 WBCS — SIGNIFICANT CHANGE UP (ref 0–0)
PHOSPHATE SERPL-MCNC: 2.6 MG/DL — SIGNIFICANT CHANGE UP (ref 2.5–4.5)
PLATELET # BLD AUTO: 348 K/UL — SIGNIFICANT CHANGE UP (ref 150–400)
POTASSIUM SERPL-MCNC: 3.9 MMOL/L — SIGNIFICANT CHANGE UP (ref 3.5–5.3)
POTASSIUM SERPL-SCNC: 3.9 MMOL/L — SIGNIFICANT CHANGE UP (ref 3.5–5.3)
RBC # BLD: 3.94 M/UL — LOW (ref 4.2–5.8)
RBC # FLD: 14.4 % — SIGNIFICANT CHANGE UP (ref 10.3–14.5)
SODIUM SERPL-SCNC: 138 MMOL/L — SIGNIFICANT CHANGE UP (ref 135–145)
WBC # BLD: 7.65 K/UL — SIGNIFICANT CHANGE UP (ref 3.8–10.5)
WBC # FLD AUTO: 7.65 K/UL — SIGNIFICANT CHANGE UP (ref 3.8–10.5)

## 2025-02-23 PROCEDURE — 86900 BLOOD TYPING SEROLOGIC ABO: CPT

## 2025-02-23 PROCEDURE — 74018 RADEX ABDOMEN 1 VIEW: CPT

## 2025-02-23 PROCEDURE — 86901 BLOOD TYPING SEROLOGIC RH(D): CPT

## 2025-02-23 PROCEDURE — 84132 ASSAY OF SERUM POTASSIUM: CPT

## 2025-02-23 PROCEDURE — 83690 ASSAY OF LIPASE: CPT

## 2025-02-23 PROCEDURE — 80048 BASIC METABOLIC PNL TOTAL CA: CPT

## 2025-02-23 PROCEDURE — C1765: CPT

## 2025-02-23 PROCEDURE — 85027 COMPLETE CBC AUTOMATED: CPT

## 2025-02-23 PROCEDURE — 85025 COMPLETE CBC W/AUTO DIFF WBC: CPT

## 2025-02-23 PROCEDURE — 85018 HEMOGLOBIN: CPT

## 2025-02-23 PROCEDURE — 83036 HEMOGLOBIN GLYCOSYLATED A1C: CPT

## 2025-02-23 PROCEDURE — C9399: CPT

## 2025-02-23 PROCEDURE — 86850 RBC ANTIBODY SCREEN: CPT

## 2025-02-23 PROCEDURE — 80053 COMPREHEN METABOLIC PANEL: CPT

## 2025-02-23 PROCEDURE — 85610 PROTHROMBIN TIME: CPT

## 2025-02-23 PROCEDURE — 84295 ASSAY OF SERUM SODIUM: CPT

## 2025-02-23 PROCEDURE — 82330 ASSAY OF CALCIUM: CPT

## 2025-02-23 PROCEDURE — 84100 ASSAY OF PHOSPHORUS: CPT

## 2025-02-23 PROCEDURE — 84134 ASSAY OF PREALBUMIN: CPT

## 2025-02-23 PROCEDURE — 82962 GLUCOSE BLOOD TEST: CPT

## 2025-02-23 PROCEDURE — 99285 EMERGENCY DEPT VISIT HI MDM: CPT

## 2025-02-23 PROCEDURE — 84443 ASSAY THYROID STIM HORMONE: CPT

## 2025-02-23 PROCEDURE — 99232 SBSQ HOSP IP/OBS MODERATE 35: CPT | Mod: FS

## 2025-02-23 PROCEDURE — 83735 ASSAY OF MAGNESIUM: CPT

## 2025-02-23 PROCEDURE — 82803 BLOOD GASES ANY COMBINATION: CPT

## 2025-02-23 PROCEDURE — 85730 THROMBOPLASTIN TIME PARTIAL: CPT

## 2025-02-23 PROCEDURE — 85014 HEMATOCRIT: CPT

## 2025-02-23 PROCEDURE — 80061 LIPID PANEL: CPT

## 2025-02-23 PROCEDURE — 82435 ASSAY OF BLOOD CHLORIDE: CPT

## 2025-02-23 PROCEDURE — 74177 CT ABD & PELVIS W/CONTRAST: CPT | Mod: MC

## 2025-02-23 PROCEDURE — 36415 COLL VENOUS BLD VENIPUNCTURE: CPT

## 2025-02-23 PROCEDURE — 82947 ASSAY GLUCOSE BLOOD QUANT: CPT

## 2025-02-23 PROCEDURE — 83605 ASSAY OF LACTIC ACID: CPT

## 2025-02-23 PROCEDURE — 71045 X-RAY EXAM CHEST 1 VIEW: CPT

## 2025-02-23 PROCEDURE — 97161 PT EVAL LOW COMPLEX 20 MIN: CPT

## 2025-02-23 RX ORDER — B1/B2/B3/B5/B6/B12/VIT C/FOLIC 500-0.5 MG
1 TABLET ORAL
Qty: 0 | Refills: 0 | DISCHARGE
Start: 2025-02-23

## 2025-02-23 RX ORDER — POLYETHYLENE GLYCOL 3350 17 G/17G
17 POWDER, FOR SOLUTION ORAL
Qty: 0 | Refills: 0 | DISCHARGE
Start: 2025-02-23

## 2025-02-23 RX ORDER — SENNA 187 MG
1 TABLET ORAL
Qty: 0 | Refills: 0 | DISCHARGE
Start: 2025-02-23

## 2025-02-23 RX ADMIN — NALOXEGOL OXALATE 25 MILLIGRAM(S): 12.5 TABLET, FILM COATED ORAL at 11:31

## 2025-02-23 RX ADMIN — Medication 1 TABLET(S): at 11:31

## 2025-02-23 RX ADMIN — Medication 975 MILLIGRAM(S): at 05:59

## 2025-02-23 RX ADMIN — ENOXAPARIN SODIUM 40 MILLIGRAM(S): 100 INJECTION SUBCUTANEOUS at 05:58

## 2025-02-23 RX ADMIN — POLYETHYLENE GLYCOL 3350 17 GRAM(S): 17 POWDER, FOR SOLUTION ORAL at 05:59

## 2025-02-23 RX ADMIN — Medication 975 MILLIGRAM(S): at 11:31

## 2025-02-23 RX ADMIN — METOPROLOL SUCCINATE 25 MILLIGRAM(S): 50 TABLET, EXTENDED RELEASE ORAL at 05:59

## 2025-02-23 NOTE — DISCHARGE NOTE NURSING/CASE MANAGEMENT/SOCIAL WORK - NSDCFUADDAPPT_GEN_ALL_CORE_FT
You may follow up at the [facility] in 2-4 weeks. You may follow up with Dr. Akhtar or any of their partners (Dr. Arguello, Luz Craft, Higinio, Marcio, Marco, Matias Isabel, Shawn Cunningham). Please call [phone] if you have any questions or concerns regarding your surgery and treatment    Please follow up with your primary care provider in 1-2 weeks after discharge from the hospital

## 2025-02-23 NOTE — PROGRESS NOTE ADULT - SUBJECTIVE AND OBJECTIVE BOX
is a ACS/ Trauma Surgery Progress Note     SUBJECTIVE  The patient was seen and examined. No acute events overnight. Pain controlled, afebrile w/ stable vitals. Having output in ostomy.     OBJECTIVE  ___________________________________________________  VITAL SIGNS / I&O's   Vital Signs Last 24 Hrs  T(C): 36.5 (23 Feb 2025 09:51), Max: 37 (22 Feb 2025 21:35)  T(F): 97.7 (23 Feb 2025 09:51), Max: 98.6 (22 Feb 2025 21:35)  HR: 62 (23 Feb 2025 09:51) (62 - 74)  BP: 101/65 (23 Feb 2025 09:51) (101/65 - 143/84)  BP(mean): --  RR: 18 (23 Feb 2025 09:51) (18 - 18)  SpO2: 92% (23 Feb 2025 09:51) (92% - 97%)    Parameters below as of 23 Feb 2025 09:51  Patient On (Oxygen Delivery Method): room air          22 Feb 2025 07:01  -  23 Feb 2025 07:00  --------------------------------------------------------  IN:    Oral Fluid: 240 mL  Total IN: 240 mL    OUT:    Colostomy (mL): 100 mL    Voided (mL): 950 mL  Total OUT: 1050 mL    Total NET: -810 mL        ___________________________________________________  PHYSICAL EXAM    Constitutional: Well developed, well nourished, NAD  Chest: Symmetric chest rise bilaterally, no increased WOB  Abdomen: Soft, nondistended, appropriate sx incisional tenderness with midline incision c/d/i. Colostomy pink with hard stool by stoma and bowel sweat.   Extremities: Warm to touch, soft, sensory and motor intact. No cyanosis/erythema/edema/hematoma      ___________________________________________________  LABS                        12.1   7.65  )-----------( 348      ( 23 Feb 2025 07:17 )             36.1     23 Feb 2025 07:17    138    |  105    |  14     ----------------------------<  78     3.9     |  19     |  0.96     Ca    8.9        23 Feb 2025 07:17  Phos  2.6       23 Feb 2025 07:17  Mg     1.9       23 Feb 2025 07:17        CAPILLARY BLOOD GLUCOSE      POCT Blood Glucose.: 81 mg/dL (23 Feb 2025 05:48)  POCT Blood Glucose.: 101 mg/dL (22 Feb 2025 17:44)        Urinalysis Basic - ( 23 Feb 2025 07:17 )    Color: x / Appearance: x / SG: x / pH: x  Gluc: 78 mg/dL / Ketone: x  / Bili: x / Urobili: x   Blood: x / Protein: x / Nitrite: x   Leuk Esterase: x / RBC: x / WBC x   Sq Epi: x / Non Sq Epi: x / Bacteria: x      ___________________________________________________  MICRO  Recent Cultures:    ___________________________________________________  MEDICATIONS  MEDICATIONS  (STANDING):  acetaminophen     Tablet .. 975 milliGRAM(s) Oral every 6 hours  benzocaine 20% Spray 1 Spray(s) Topical once  dextrose 50% Injectable 12.5 Gram(s) IV Push once  dextrose 50% Injectable 12.5 Gram(s) IV Push once  enoxaparin Injectable 40 milliGRAM(s) SubCutaneous every 24 hours  influenza  Vaccine (HIGH DOSE) 0.5 milliLiter(s) IntraMuscular once  metoprolol tartrate 25 milliGRAM(s) Oral two times a day  multivitamin 1 Tablet(s) Oral daily  naloxegol 25 milliGRAM(s) Oral daily  polyethylene glycol 3350 17 Gram(s) Oral every 12 hours  senna 1 Tablet(s) Oral daily  thiamine 100 milliGRAM(s) Oral every 24 hours    MEDICATIONS  (PRN):  nalbuphine Injectable 2.5 milliGRAM(s) IV Push every 6 hours PRN Pruritus  naloxone Injectable 0.1 milliGRAM(s) IV Push every 3 minutes PRN For ANY of the following changes in patient status:  A. RR LESS THAN 10 breaths per minute, B. Oxygen saturation LESS THAN 90%, C. Sedation score of 6  oxyCODONE    IR 2.5 milliGRAM(s) Oral every 4 hours PRN Moderate Pain (4 - 6)  oxyCODONE    IR 5 milliGRAM(s) Oral every 4 hours PRN Severe Pain (7 - 10)      ___________________________________________________    Assessment/Plan   LISA HARLEY is a 79M with PMHx rectal cancer s/p neoadjuvant chemo and APR (Dr. Hogan 2010), throat cancer s/p chemo/radiation, and perforated gastric ulcer s/p Kash patch with falciform (Dr. Akhtar, 2024), presenting with closed loop bowel obstruction. He is now s/p ex-lap and open JOSELUIS on 2/14/25. Hospital course c/b low colostomy output. CT abd/pelvis (2/19/25) significant for narrowed segment of small bowel and several areas of fluid collection that are not rim enhancing (not well formed abscesses). AXR (2/20/25) with contrast in colon    PLAN:  - Monitor ostomy function  - Pain control: IV Tylenol ATC, IV Dilaudid PRN  - Diet: Reg  - Bowel Reg  - Hold off on IR consult for collections as they are small and pt afebrile without leukocytosis  - TPN was consulted as patient has not eaten for over 7 days and very malnourished prior to surgery, patient and family refusing TPN at this time and wanting to wait.  - OOB and ambulating as tolerated  - DVT ppx: LVX  - PT: NPT  - Dispo planning 2/23 with miralax and senna    ACS/Trauma Surgery  [id number] ACS/ Trauma Surgery Progress Note     SUBJECTIVE  The patient was seen and examined. No acute events overnight. Pain controlled, afebrile w/ stable vitals. Having output in ostomy.     OBJECTIVE  ___________________________________________________  VITAL SIGNS / I&O's   Vital Signs Last 24 Hrs  T(C): 36.5 (23 Feb 2025 09:51), Max: 37 (22 Feb 2025 21:35)  T(F): 97.7 (23 Feb 2025 09:51), Max: 98.6 (22 Feb 2025 21:35)  HR: 62 (23 Feb 2025 09:51) (62 - 74)  BP: 101/65 (23 Feb 2025 09:51) (101/65 - 143/84)  BP(mean): --  RR: 18 (23 Feb 2025 09:51) (18 - 18)  SpO2: 92% (23 Feb 2025 09:51) (92% - 97%)    Parameters below as of 23 Feb 2025 09:51  Patient On (Oxygen Delivery Method): room air          22 Feb 2025 07:01  -  23 Feb 2025 07:00  --------------------------------------------------------  IN:    Oral Fluid: 240 mL  Total IN: 240 mL    OUT:    Colostomy (mL): 100 mL    Voided (mL): 950 mL  Total OUT: 1050 mL    Total NET: -810 mL        ___________________________________________________  PHYSICAL EXAM    Constitutional: Well developed, well nourished, NAD  Chest: Symmetric chest rise bilaterally, no increased WOB  Abdomen: Soft, nondistended, appropriate sx incisional tenderness with midline incision c/d/i. Colostomy pink with hard stool by stoma and bowel sweat.   Extremities: Warm to touch, soft, sensory and motor intact. No cyanosis/erythema/edema/hematoma      ___________________________________________________  LABS                        12.1   7.65  )-----------( 348      ( 23 Feb 2025 07:17 )             36.1     23 Feb 2025 07:17    138    |  105    |  14     ----------------------------<  78     3.9     |  19     |  0.96     Ca    8.9        23 Feb 2025 07:17  Phos  2.6       23 Feb 2025 07:17  Mg     1.9       23 Feb 2025 07:17        CAPILLARY BLOOD GLUCOSE      POCT Blood Glucose.: 81 mg/dL (23 Feb 2025 05:48)  POCT Blood Glucose.: 101 mg/dL (22 Feb 2025 17:44)        Urinalysis Basic - ( 23 Feb 2025 07:17 )    Color: x / Appearance: x / SG: x / pH: x  Gluc: 78 mg/dL / Ketone: x  / Bili: x / Urobili: x   Blood: x / Protein: x / Nitrite: x   Leuk Esterase: x / RBC: x / WBC x   Sq Epi: x / Non Sq Epi: x / Bacteria: x      ___________________________________________________  MICRO  Recent Cultures:    ___________________________________________________  MEDICATIONS  MEDICATIONS  (STANDING):  acetaminophen     Tablet .. 975 milliGRAM(s) Oral every 6 hours  benzocaine 20% Spray 1 Spray(s) Topical once  dextrose 50% Injectable 12.5 Gram(s) IV Push once  dextrose 50% Injectable 12.5 Gram(s) IV Push once  enoxaparin Injectable 40 milliGRAM(s) SubCutaneous every 24 hours  influenza  Vaccine (HIGH DOSE) 0.5 milliLiter(s) IntraMuscular once  metoprolol tartrate 25 milliGRAM(s) Oral two times a day  multivitamin 1 Tablet(s) Oral daily  naloxegol 25 milliGRAM(s) Oral daily  polyethylene glycol 3350 17 Gram(s) Oral every 12 hours  senna 1 Tablet(s) Oral daily  thiamine 100 milliGRAM(s) Oral every 24 hours    MEDICATIONS  (PRN):  nalbuphine Injectable 2.5 milliGRAM(s) IV Push every 6 hours PRN Pruritus  naloxone Injectable 0.1 milliGRAM(s) IV Push every 3 minutes PRN For ANY of the following changes in patient status:  A. RR LESS THAN 10 breaths per minute, B. Oxygen saturation LESS THAN 90%, C. Sedation score of 6  oxyCODONE    IR 2.5 milliGRAM(s) Oral every 4 hours PRN Moderate Pain (4 - 6)  oxyCODONE    IR 5 milliGRAM(s) Oral every 4 hours PRN Severe Pain (7 - 10)      ___________________________________________________    Assessment/Plan   LISA HARLEY is a 79M with PMHx rectal cancer s/p neoadjuvant chemo and APR (Dr. Hogan 2010), throat cancer s/p chemo/radiation, and perforated gastric ulcer s/p Kash patch with falciform (Dr. Akhtar, 2024), presenting with closed loop bowel obstruction. He is now s/p ex-lap and open JOSELUIS on 2/14/25. Hospital course c/b low colostomy output. CT abd/pelvis (2/19/25) significant for narrowed segment of small bowel and several areas of fluid collection that are not rim enhancing (not well formed abscesses). AXR (2/20/25) with contrast in colon    PLAN:  - Monitor ostomy function  - Pain control: IV Tylenol ATC, IV Dilaudid PRN  - Diet: Reg  - Bowel Reg  - Hold off on IR consult for collections as they are small and pt afebrile without leukocytosis  - TPN was consulted as patient has not eaten for over 7 days and very malnourished prior to surgery, patient and family refusing TPN at this time and wanting to wait.  - OOB and ambulating as tolerated  - DVT ppx: LVX  - PT: NPT  - Dispo planning 2/23 with miralax and senna    ACS/Trauma Surgery  [id number]

## 2025-02-23 NOTE — PROGRESS NOTE ADULT - PROVIDER SPECIALTY LIST ADULT
Anesthesia
Cardiology
Nutrition Support
Surgery
Trauma Surgery
Anesthesia
Cardiology
Trauma Surgery
Anesthesia
Cardiology
Nutrition Support
Nutrition Support
Surgery
Trauma Surgery
Trauma Surgery
Cardiology
Surgery
Surgery
Trauma Surgery

## 2025-02-23 NOTE — DISCHARGE NOTE NURSING/CASE MANAGEMENT/SOCIAL WORK - PATIENT PORTAL LINK FT
JW,    Home Care is calling regarding an established patient with [facility].  Patient was given referral for PT due to falls but needed home care orders as patient is at care facility.    [facility]  Callback [phone]  Ok to leave detailed voicemail    Requesting orders from: Wegener, Joel Daniel Irwin  Provider is following patient: No           Orders Requested    Physical Therapy  Request for initial evaluation and treatment (one time) (first set of orders)   Delay in start of care to date: 8/6/2024    Information was gathered and will be sent to provider to confirm provider will be following patient.    RN will contact Home Care with information after provider review.  Confirmed ok to leave a detailed message with call back.  Contact information confirmed and updated as needed.    Kenneth Ponce RN    You can access the FollowMyHealth Patient Portal offered by [facility] by registering at the following website: [email or web address]. By joining [email or web address] FollowMyHealth portal, you will also be able to view your health information using other applications (apps) compatible with our system.

## 2025-02-23 NOTE — DISCHARGE NOTE NURSING/CASE MANAGEMENT/SOCIAL WORK - NSDCPETBCESMAN_GEN_ALL_CORE
Pt A&Ox4, VSS on RA, and afebrile. Completed 1st dose of cytarabine. Complaints of nausea and headache after completion of clofaribine. Given PRN tylenol and compazine. Headache not improved by tylenol. Bactroban applied to hand sores. Pt independently manages wounds. Infrequent cough lingers from RSV. Independently moves in room. Good appetite. Pt would prefer Citrucel scheduled before meals. Gave 1x dose. Continue with plan of care.    If you are a smoker, it is important for your health to stop smoking. Please be aware that second hand smoke is also harmful.

## 2025-02-23 NOTE — PROGRESS NOTE ADULT - ASSESSMENT
79 year old man with PMHx rectal cancer s/p neoadjuvant chemo and APR (Dr. Ruiz 2010), throat cancer s/p chemo/radiation, and perforated gastric ulcer s/p Kash patch with falciform (Dr. Akhtar, 2024) admitted on 2/14/25 for closed loop SBO . Patient underwent ex lap with open JOSELUIS on same day and post op course c/b ileus.  Patient had NGT placed (removed 2/17) and still with no output from ostomy.  TPN team consulted given insufficient enteral intake.  Patient with history of TPN 8/24-11/2024.     GOAL PN: 120g amino acids, 210 g dextrose,65 g SMOF lipid; to provide 1844 kcal/day (29 kcal/kg and 1.9 g/kg protein based on dosing wt 63.5 kg)  Non-Protein Calories: 1364 kcal/day (21 kcal/kg, based on dosing wt 63.5 kg); Dextrose Infusion Rate: 2.3 mg/kg/min (24-hour infusion); Lipid Infusion Rate: 0.98 g/kg; 0.08 g/kg/hr (12-hour infusion)      - Nutritional Assessment, patient with severe protein calorie malnutrition not meeting nutritional goals enterally due to ileus/NPO status and would likely benefit from TPN for nutritional support; prealbumin only 8 mg/dL on 2/19 - trend weekly   - TPN plan:  Discussed TPN initiation with pros/cons; patient would like to hold off on TPN today -->- TPN was consulted as patient has not eaten for over 7 days and very malnourished prior to surgery, patient and family refusing TPN at this time and wanting to wait. Pt tolerating clears presently.  - TPN access:  Patient will need a dedicated central line if/once TPN approved  - Anemia:  check iron studies, B12, Folate levels  - Electrolyte Imbalance risk:  recommend to check CMP, Mg, Phos and ionized Ca daily, to be supplemented peripherally per primary team.  - Ileus:  monitor ostomy output overnight, CT a/p, f/u surgical plan and nutrition parameters in AM  - IV fluids per primary team; IV fluids per primary team; can add MVI and Thiamine IV to help decrease risk of refeeding syndrome once nutrition, whether TPN vs enteral, resumes   - Recommend strict intake and output/weight checks three times a week  - Hypoglycemia:  HgA1c 5.1%; finger sticks every 6 hours to monitor glucose trend  - Risk for Hypertriglyceridemia with TPN:  plan to check baseline lipid profile in AM and monitor TG closely once/if TPN initiated   - Further care per Acute Care Surgery team, pager [phone].      Available on TEAMS  TPN spectra [id number] ([phone] when dialing from outside line)  M-F 8A-2P, Weekends and holidays 8/9A-12/1P  Discussed with Dr. Gerardo Silverio     show

## 2025-02-23 NOTE — PROGRESS NOTE ADULT - REASON FOR ADMISSION
closed loop SBO

## 2025-02-23 NOTE — PROGRESS NOTE ADULT - SUBJECTIVE AND OBJECTIVE BOX
[facility] NUTRITION SUPPORT-- FOLLOW UP NOTE      24 hour events/subjective:    TPN consulted for Protein Calorie Malnutrition in the setting of nutritional optimization for anticipated minimal PO intake, pt  w/ h/o TPN 8-11/2024 for post op ileus. Pt remains off of TPN as per pt request.        Diet:  Diet, Regular (02-22-25 @ 11:45)      PAST HISTORY  --------------------------------------------------------------------------------  No significant changes to PMH, PSH, FHx, SHx, unless otherwise noted    ALLERGIES & MEDICATIONS  --------------------------------------------------------------------------------  Allergies    No Known Allergies    Intolerances      Standing Inpatient Medications  acetaminophen     Tablet .. 975 milliGRAM(s) Oral every 6 hours  benzocaine 20% Spray 1 Spray(s) Topical once  dextrose 50% Injectable 12.5 Gram(s) IV Push once  dextrose 50% Injectable 12.5 Gram(s) IV Push once  enoxaparin Injectable 40 milliGRAM(s) SubCutaneous every 24 hours  influenza  Vaccine (HIGH DOSE) 0.5 milliLiter(s) IntraMuscular once  metoprolol tartrate 25 milliGRAM(s) Oral two times a day  multivitamin 1 Tablet(s) Oral daily  naloxegol 25 milliGRAM(s) Oral daily  polyethylene glycol 3350 17 Gram(s) Oral every 12 hours  senna 1 Tablet(s) Oral daily  thiamine 100 milliGRAM(s) Oral every 24 hours    PRN Inpatient Medications  nalbuphine Injectable 2.5 milliGRAM(s) IV Push every 6 hours PRN  naloxone Injectable 0.1 milliGRAM(s) IV Push every 3 minutes PRN  oxyCODONE    IR 2.5 milliGRAM(s) Oral every 4 hours PRN  oxyCODONE    IR 5 milliGRAM(s) Oral every 4 hours PRN      REVIEW OF SYSTEMS  --------------------------------------------------------------------------------  Gen: as per HPI  Skin: No rashes  Head/Eyes/Ears/Mouth: No headache; No sore throat  Respiratory: No dyspnea, cough,   CV: No chest pain, PND, orthopnea  GI: as per HPI  : No increased frequency, dysuria, hematuria, nocturia  MSK: No joint pain/swelling; no back pain; no edema  Neuro: No dizziness/lightheadedness, weakness, seizures, numbness, tingling  Psych: No significant nervousness, anxiety, stress, depression    All other systems were reviewed and are negative, except as noted.        LABS/STUDIES  --------------------------------------------------------------------------------              12.1   7.65  >-----------<  348      [02-23-25 @ 07:17]              36.1     138  |  105  |  14  ----------------------------<  78      [02-23-25 @ 07:17]  3.9   |  19  |  0.96        Ca     8.9     [02-23-25 @ 07:17]      Mg     1.9     [02-23-25 @ 07:17]      Phos  2.6     [02-23-25 @ 07:17]                Glucose: 78 mg/dL (02-23-25 @ 07:17)  Glucose: 98 mg/dL (02-22-25 @ 13:46)    Prealbumin, Serum: 8 mg/dL (02-19-25 @ 07:14)    Triglycerides      CAPILLARY BLOOD GLUCOSE      POCT Blood Glucose.: 81 mg/dL (23 Feb 2025 05:48)  POCT Blood Glucose.: 101 mg/dL (22 Feb 2025 17:44)      VITALS/PHYSICAL EXAM  --------------------------------------------------------------------------------  T(C): 37 (02-23-25 @ 05:53), Max: 37 (02-22-25 @ 21:35)  HR: 68 (02-23-25 @ 05:53) (64 - 74)  BP: 143/84 (02-23-25 @ 05:53) (113/80 - 143/84)  RR: 18 (02-23-25 @ 05:53) (18 - 18)  SpO2: 96% (02-23-25 @ 05:53) (94% - 97%)  Wt(kg): --        02-22-25 @ 07:01  -  02-23-25 @ 07:00  --------------------------------------------------------  IN: 240 mL / OUT: 1050 mL / NET: -810 mL      Physical Exam:    Gen: NAD, well-appearing  HEENT: PERRL, NCAT  Neck: trachea midline no noted JVD  Chest: non labored breathing equal chest expansion b/l  Abd: soft, nontender/nondistended  UE: WW no c/c/e  LE: [facility] no c/c/e  Neuro: AOx3  Psych: Normal affect and mood  Skin: Warm, without rashes  .  .  .    Physical Exam:    Gen: NAD, well-appearing  HEENT: PERRL, NCAT  Neck: trachea midline no noted JVD  Chest: non labored breathing equal chest expansion b/l  Abd: Soft and nontender. No ostomy output. Ostomy pink and viable.   UE: WWP no c/c/e  LE: [facility] no c/c/e  Neuro: AOx3  Psych: Normal affect and mood  Skin: Warm, without rashes  .  .  .        Assessment and Plan:   · Assessment

## 2025-02-23 NOTE — PROGRESS NOTE ADULT - ATTENDING COMMENTS
seen and examined 02-17-25 @ 1032    7/13/2010 @ [facility] - open APR for rectal cancer  8/11/2024 @ [facility] - open Falciform patch for perforated pyloric channel ulcer    NPO w/ NG to suction  no nausea or vomiting  end colostomy bag empty    soft / NT / ND  clean dressing over midline laparotomy incision  colostomy is not stenotic on digitalization    NG - 75 mL bilious drainage / 24 hours      2/14/2025 - open lysis of adhesions for closed-loop small bowel obstruction  -NPO / NG / IVF until post-op ileus resolves
ATTENDING ATTESTATION  I have seen and examined this patient on rounds thismorning with the surgery team. I have reviewed all new labs, imaging and reports. I have participated in formulating the plan for the day, and have read and agree with the history, ROS, exam, assessment and plan as stated above.     Tolerating regular diet with good stool output from colostomy.  medically ready for discharge today.    Needs to continue good bowel regimen (will recommend Senna and miralax daily).     Madeline Cunningham M.D., M.S.  Division of Acute Care Surgery
ATTENDING ATTESTATION  I have seen and examined this patient on rounds thismorning with the surgery team. I have reviewed all new labs, imaging and reports. I have participated in formulating the plan for the day, and have read and agree with the history, ROS, exam, assessment and plan as stated above.     Has had some small pieces of hard stool from ostomy. Will do an enema thru the ostomy today and start bowel regimen.  OK for CLD today.     Madeline Cunningham M.D., M.S.  Division of Acute Care Surgery
ATTENDING ATTESTATION  I have seen and examined this patient on rounds thismorning with the surgery team. I have reviewed all new labs, imaging and reports. I have participated in formulating the plan for the day, and have read and agree with the history, ROS, exam, assessment and plan as stated above.     CT performed yesterday for lack of ostomy function showed a narrowed segment of small bowel. Also shows several areas of fluid collection that are not rim enhancing (not well formed abscesses).   Patient has no infectious symptoms, no indication at this time to drain the fluid.   AXR thismorning shows contrast through to the colon --> no obstruction.   There was a small piece of hardened stool in the ostomy thismorning --> will digitize ostomy to clear hard stool.   Ok for sips and chips.   Will d/c PCA and transition to PRN meds for pain. Start Movantic.     Madeline Cunningham M.D., M.S.  Division of Acute Care Surgery
ATTENDING ATTESTATION  I have seen and examined this patient on rounds thismorning with the surgery team. I have reviewed all new labs, imaging and reports. I have participated in formulating the plan for the day, and have read and agree with the history, ROS, exam, assessment and plan as stated above.     POD 4 from ex lap for closed loop SBO with JOSELUIS.   Still no return of bowel function. Has not eaten for 7 days.   Has poor swallowing/eating at baseline. Will get nutrition consult and start TPN for now.   NGT has been removed. OK for sips and chips only.     Madeline Cunningham M.D., M.S.  Division of Acute Care Surgery
ATTENDING ATTESTATION  I have seen and examined this patient on rounds thismorning with the surgery team. I have reviewed all new labs, imaging and reports. I have participated in formulating the plan for the day, and have read and agree with the history, ROS, exam, assessment and plan as stated above.     Ostomy now functioning well, did not requiring flushing or manual manipulation.   Aggressive bowel reg to keep stool soft.   OK for regular diet today.     Madeline Cunningham M.D., M.S.  Division of Acute Care Surgery

## 2025-02-23 NOTE — PROGRESS NOTE ADULT - SUBJECTIVE AND OBJECTIVE BOX
Subjective: Patient seen and examined. No new events except as noted.     SUBJECTIVE/ROS:  No chest pain, dyspnea, palpitation, or dizziness.       MEDICATIONS:  MEDICATIONS  (STANDING):  acetaminophen     Tablet .. 975 milliGRAM(s) Oral every 6 hours  benzocaine 20% Spray 1 Spray(s) Topical once  dextrose 50% Injectable 12.5 Gram(s) IV Push once  dextrose 50% Injectable 12.5 Gram(s) IV Push once  enoxaparin Injectable 40 milliGRAM(s) SubCutaneous every 24 hours  influenza  Vaccine (HIGH DOSE) 0.5 milliLiter(s) IntraMuscular once  metoprolol tartrate 25 milliGRAM(s) Oral two times a day  multivitamin 1 Tablet(s) Oral daily  naloxegol 25 milliGRAM(s) Oral daily  polyethylene glycol 3350 17 Gram(s) Oral every 12 hours  senna 1 Tablet(s) Oral daily  thiamine 100 milliGRAM(s) Oral every 24 hours      PHYSICAL EXAM:  T(C): 36.5 (02-23-25 @ 09:51), Max: 37 (02-22-25 @ 21:35)  HR: 62 (02-23-25 @ 09:51) (62 - 74)  BP: 101/65 (02-23-25 @ 09:51) (101/65 - 143/84)  RR: 18 (02-23-25 @ 09:51) (18 - 18)  SpO2: 92% (02-23-25 @ 09:51) (92% - 97%)  Wt(kg): --  I&O's Summary    22 Feb 2025 07:01  -  23 Feb 2025 07:00  --------------------------------------------------------  IN: 240 mL / OUT: 1050 mL / NET: -810 mL            JVP: Normal  Neck: supple  Lung: clear   CV: S1 S2 , Murmur:  Abd: soft  Ext: No edema  neuro: Awake / alert  Psych: flat affect  Skin: normal``    LABS/DATA:    CARDIAC MARKERS:                                12.1   7.65  )-----------( 348      ( 23 Feb 2025 07:17 )             36.1     02-23    138  |  105  |  14  ----------------------------<  78  3.9   |  19[L]  |  0.96    Ca    8.9      23 Feb 2025 07:17  Phos  2.6     02-23  Mg     1.9     02-23      proBNP:   Lipid Profile:   HgA1c:   TSH:     TELE:  EKG:

## 2025-02-24 ENCOUNTER — NON-APPOINTMENT (OUTPATIENT)
Age: 80
End: 2025-02-24

## 2025-02-24 DIAGNOSIS — Z87.19 PERSONAL HISTORY OF OTHER DISEASES OF THE DIGESTIVE SYSTEM: ICD-10-CM

## 2025-02-24 DIAGNOSIS — Z98.890 OTHER SPECIFIED POSTPROCEDURAL STATES: ICD-10-CM

## 2025-02-24 RX ORDER — ACETAMINOPHEN 325 MG/1
325 TABLET ORAL
Qty: 60 | Refills: 0 | Status: ACTIVE | COMMUNITY
Start: 2025-02-24 | End: 1900-01-01

## 2025-02-24 RX ORDER — POLYETHYLENE GLYCOL 3350 17 G/17G
17 POWDER, FOR SOLUTION ORAL
Qty: 4 | Refills: 0 | Status: ACTIVE | COMMUNITY
Start: 2025-02-24 | End: 1900-01-01

## 2025-02-24 RX ORDER — SENNOSIDES 8.6 MG TABLETS 8.6 MG/1
8.6 TABLET ORAL
Qty: 30 | Refills: 0 | Status: ACTIVE | COMMUNITY
Start: 2025-02-24 | End: 1900-01-01

## 2025-02-24 RX ORDER — PNV NO.95/FERROUS FUM/FOLIC AC 28MG-0.8MG
TABLET ORAL
Qty: 30 | Refills: 0 | Status: ACTIVE | COMMUNITY
Start: 2025-02-24 | End: 1900-01-01

## 2025-02-26 ENCOUNTER — APPOINTMENT (OUTPATIENT)
Dept: OTOLARYNGOLOGY | Facility: CLINIC | Age: 80
End: 2025-02-26
Payer: MEDICARE

## 2025-02-26 VITALS
OXYGEN SATURATION: 98 % | WEIGHT: 140 LBS | DIASTOLIC BLOOD PRESSURE: 53 MMHG | SYSTOLIC BLOOD PRESSURE: 103 MMHG | BODY MASS INDEX: 19.6 KG/M2 | HEART RATE: 86 BPM | HEIGHT: 71 IN

## 2025-02-26 DIAGNOSIS — C10.9 MALIGNANT NEOPLASM OF OROPHARYNX, UNSPECIFIED: ICD-10-CM

## 2025-02-26 PROCEDURE — 99214 OFFICE O/P EST MOD 30 MIN: CPT

## 2025-03-04 ENCOUNTER — APPOINTMENT (OUTPATIENT)
Dept: INTERNAL MEDICINE | Facility: CLINIC | Age: 80
End: 2025-03-04
Payer: MEDICARE

## 2025-03-04 VITALS
SYSTOLIC BLOOD PRESSURE: 120 MMHG | WEIGHT: 140 LBS | OXYGEN SATURATION: 95 % | HEIGHT: 71 IN | BODY MASS INDEX: 19.6 KG/M2 | RESPIRATION RATE: 14 BRPM | DIASTOLIC BLOOD PRESSURE: 70 MMHG | TEMPERATURE: 97.2 F | HEART RATE: 55 BPM

## 2025-03-04 VITALS — SYSTOLIC BLOOD PRESSURE: 108 MMHG | DIASTOLIC BLOOD PRESSURE: 60 MMHG

## 2025-03-04 DIAGNOSIS — K56.609 UNSPECIFIED INTESTINAL OBSTRUCTION, UNSPECIFIED AS TO PARTIAL VERSUS COMPLETE OBSTRUCTION: ICD-10-CM

## 2025-03-04 DIAGNOSIS — I10 ESSENTIAL (PRIMARY) HYPERTENSION: ICD-10-CM

## 2025-03-04 PROCEDURE — 99495 TRANSJ CARE MGMT MOD F2F 14D: CPT

## 2025-03-04 RX ORDER — NIFEDIPINE 30 MG/1
30 TABLET, EXTENDED RELEASE ORAL
Qty: 30 | Refills: 0 | Status: COMPLETED | COMMUNITY
Start: 2025-02-24 | End: 2025-03-04

## 2025-03-24 ENCOUNTER — RX RENEWAL (OUTPATIENT)
Age: 80
End: 2025-03-24

## 2025-03-24 RX ORDER — MULTIVITAMIN WITH FOLIC ACID 400 MCG
TABLET ORAL
Qty: 30 | Refills: 3 | Status: ACTIVE | COMMUNITY
Start: 2025-03-24 | End: 1900-01-01

## 2025-04-07 ENCOUNTER — APPOINTMENT (OUTPATIENT)
Dept: PHYSICAL MEDICINE AND REHAB | Facility: CLINIC | Age: 80
End: 2025-04-07
Payer: MEDICARE

## 2025-04-07 PROCEDURE — 99204 OFFICE O/P NEW MOD 45 MIN: CPT

## 2025-05-09 ENCOUNTER — APPOINTMENT (OUTPATIENT)
Dept: RADIATION ONCOLOGY | Facility: CLINIC | Age: 80
End: 2025-05-09

## 2025-05-09 ENCOUNTER — OUTPATIENT (OUTPATIENT)
Dept: OUTPATIENT SERVICES | Facility: HOSPITAL | Age: 80
LOS: 1 days | Discharge: HOME | End: 2025-05-09

## 2025-05-09 DIAGNOSIS — Z92.21 PERSONAL HISTORY OF ANTINEOPLASTIC CHEMOTHERAPY: Chronic | ICD-10-CM

## 2025-05-09 DIAGNOSIS — C10.9 MALIGNANT NEOPLASM OF OROPHARYNX, UNSPECIFIED: ICD-10-CM

## 2025-05-09 DIAGNOSIS — Z92.3 PERSONAL HISTORY OF IRRADIATION: Chronic | ICD-10-CM

## 2025-05-09 DIAGNOSIS — Z93.3 COLOSTOMY STATUS: Chronic | ICD-10-CM

## 2025-05-09 DIAGNOSIS — C20 MALIGNANT NEOPLASM OF RECTUM: Chronic | ICD-10-CM

## 2025-05-14 ENCOUNTER — APPOINTMENT (OUTPATIENT)
Dept: OTOLARYNGOLOGY | Facility: CLINIC | Age: 80
End: 2025-05-14

## 2025-06-12 ENCOUNTER — APPOINTMENT (OUTPATIENT)
Dept: CT IMAGING | Facility: IMAGING CENTER | Age: 80
End: 2025-06-12

## 2025-06-13 ENCOUNTER — APPOINTMENT (OUTPATIENT)
Dept: HEMATOLOGY ONCOLOGY | Facility: CLINIC | Age: 80
End: 2025-06-13

## 2025-06-22 ENCOUNTER — OUTPATIENT (OUTPATIENT)
Dept: OUTPATIENT SERVICES | Facility: HOSPITAL | Age: 80
LOS: 1 days | End: 2025-06-22
Payer: MEDICARE

## 2025-06-22 DIAGNOSIS — C20 MALIGNANT NEOPLASM OF RECTUM: Chronic | ICD-10-CM

## 2025-06-22 DIAGNOSIS — Z92.21 PERSONAL HISTORY OF ANTINEOPLASTIC CHEMOTHERAPY: Chronic | ICD-10-CM

## 2025-06-22 DIAGNOSIS — C10.9 MALIGNANT NEOPLASM OF OROPHARYNX, UNSPECIFIED: ICD-10-CM

## 2025-06-22 DIAGNOSIS — Z93.3 COLOSTOMY STATUS: Chronic | ICD-10-CM

## 2025-06-22 DIAGNOSIS — Z92.3 PERSONAL HISTORY OF IRRADIATION: Chronic | ICD-10-CM

## 2025-06-22 PROCEDURE — 70491 CT SOFT TISSUE NECK W/DYE: CPT

## 2025-06-22 PROCEDURE — 71260 CT THORAX DX C+: CPT

## 2025-07-12 ENCOUNTER — RX RENEWAL (OUTPATIENT)
Age: 80
End: 2025-07-12

## 2025-08-27 ENCOUNTER — APPOINTMENT (OUTPATIENT)
Dept: OTOLARYNGOLOGY | Facility: CLINIC | Age: 80
End: 2025-08-27
Payer: MEDICARE

## 2025-08-27 VITALS
DIASTOLIC BLOOD PRESSURE: 68 MMHG | WEIGHT: 144 LBS | SYSTOLIC BLOOD PRESSURE: 155 MMHG | BODY MASS INDEX: 20.16 KG/M2 | HEIGHT: 71 IN | HEART RATE: 61 BPM | OXYGEN SATURATION: 99 %

## 2025-08-27 DIAGNOSIS — C10.9 MALIGNANT NEOPLASM OF OROPHARYNX, UNSPECIFIED: ICD-10-CM

## 2025-08-27 PROCEDURE — 99214 OFFICE O/P EST MOD 30 MIN: CPT

## (undated) DEVICE — CANISTER W/GEL INFOVAC 500ML

## (undated) DEVICE — CANISTER W/O GEL INFOVAC 500ML

## (undated) DEVICE — DRAPE INSTRUMENT POUCH 6.75" X 11"

## (undated) DEVICE — DRSG TELFA 4 X 8

## (undated) DEVICE — TUBING SUCTION NONCONDUCTIVE 6MM X 12FT

## (undated) DEVICE — BLADE SCALPEL SAFETYLOCK #15

## (undated) DEVICE — STAPLER SKIN VISI-STAT 35 WIDE

## (undated) DEVICE — DRAPE 1/2 SHEET 40X57"

## (undated) DEVICE — GOWN TRIMAX LG

## (undated) DEVICE — DRAIN JACKSON PRATT 10MM FLAT FULL NO TROCAR

## (undated) DEVICE — LIGASURE IMPACT

## (undated) DEVICE — SUT SOFSILK 2-0 18" V-20 (POP-OFF)

## (undated) DEVICE — GLV 7.5 PROTEXIS (WHITE)

## (undated) DEVICE — Device

## (undated) DEVICE — GUARD TEETH ADULT

## (undated) DEVICE — STAPLER COVIDIEN ENDO GIA SHORT HANDLE

## (undated) DEVICE — SUCTION YANKAUER NO CONTROL VENT

## (undated) DEVICE — NERVE STIMULATOR/LOCATOR HEAD AND NECK MODEL

## (undated) DEVICE — TUBING SUCTION 20FT

## (undated) DEVICE — SOL IRR BAG NS 0.9% 3000ML

## (undated) DEVICE — CANISTER DISPOSABLE THIN WALL 3000CC

## (undated) DEVICE — URETERAL CATH RED RUBBER 12FR (WHITE)

## (undated) DEVICE — CATH NG SALEM SUMP 16FR

## (undated) DEVICE — PREP BETADINE KIT

## (undated) DEVICE — SOL IRR POUR NS 0.9% 500ML

## (undated) DEVICE — FOLEY TRAY 16FR 5CC LTX UMETER CLOSED

## (undated) DEVICE — ELCTR BOVIE PENCIL SMOKE EVACUATION

## (undated) DEVICE — LABELS BLANK W PEN

## (undated) DEVICE — DRAPE MAYO STAND 30"

## (undated) DEVICE — DRAPE IOBAN 23" X 23"

## (undated) DEVICE — DRAPE TOWEL BLUE 17" X 24"

## (undated) DEVICE — S&N ARTHROCARE ENT WAND PLASMA EVAC 70 XTRA T&A

## (undated) DEVICE — DRSG VAC GRANUFOAM MEDIUM (BLACK)

## (undated) DEVICE — CANISTER KCI 500ML GEL SENSA TRAC

## (undated) DEVICE — PACK MAJOR ABDOMINAL SUPINE

## (undated) DEVICE — DRSG TAPE UMBILICAL COTTON 2" X 30 X 1/8"

## (undated) DEVICE — WARMING BLANKET FULL UNDERBODY

## (undated) DEVICE — SUCTION CAUTERY 12FR

## (undated) DEVICE — DRSG TELFA 3 X 8

## (undated) DEVICE — MARKING PEN W RULER

## (undated) DEVICE — DRAPE 3/4 SHEET W REINFORCEMENT 56X77"

## (undated) DEVICE — WARMING BLANKET LOWER ADULT

## (undated) DEVICE — VENODYNE/SCD SLEEVE CALF MEDIUM

## (undated) DEVICE — DRSG CURITY GAUZE SPONGE 4 X 4" 12-PLY

## (undated) DEVICE — PREP CHLORAPREP HI-LITE ORANGE 26ML

## (undated) DEVICE — CANISTER W/GEL INFOVAC 1000ML

## (undated) DEVICE — SUT MAXON 1 96" T-60

## (undated) DEVICE — URETERAL CATH RED RUBBER 8FR

## (undated) DEVICE — GLV 7 PROTEXIS (WHITE)

## (undated) DEVICE — GLV 8 PROTEXIS (WHITE)

## (undated) DEVICE — DRSG STERISTRIPS 0.5 X 4"

## (undated) DEVICE — WARMING BLANKET UPPER ADULT

## (undated) DEVICE — STRYKER PULSE LAVAGE WITH HIGH FLOW TIP

## (undated) DEVICE — DRAIN RESERVOIR FOR JACKSON PRATT 100CC CARDINAL

## (undated) DEVICE — GLV 6.5 PROTEXIS (WHITE)

## (undated) DEVICE — VESSEL LOOP MAXI-BLUE 0.120" X 16"

## (undated) DEVICE — DRSG XEROFORM 1 X 8"

## (undated) DEVICE — DRSG TEGADERM 6"X8"

## (undated) DEVICE — DRSG VAC ABTHERS

## (undated) DEVICE — DRAPE FLUID WARMER 44 X 44"

## (undated) DEVICE — GLV 8.5 PROTEXIS (WHITE)

## (undated) DEVICE — DRSG OPSITE 13.75 X 4"

## (undated) DEVICE — POSITIONER FOAM EGG CRATE ULNAR 2PCS (PINK)

## (undated) DEVICE — DRSG XEROFORM 5 X 9"

## (undated) DEVICE — SUT SOFSILK 3-0 18" V-20 (POP-OFF)

## (undated) DEVICE — BLADE SCALPEL SAFETYLOCK #10

## (undated) DEVICE — POSITIONER STRAP ARMBOARD VELCRO TS-30

## (undated) DEVICE — DRSG TEGADERM 6 X 8"

## (undated) DEVICE — SUT PROLENE 4-0 36" SH

## (undated) DEVICE — MEDICATION LABELS W MARKER

## (undated) DEVICE — WARMING BLANKET FULL ADULT

## (undated) DEVICE — ELCTR GROUNDING PAD ADULT COVIDIEN

## (undated) DEVICE — DRAPE 3/4 SHEET 52X76"

## (undated) DEVICE — TAPE SILK 1"

## (undated) DEVICE — STRYKER INTERPULSE HANDPIECE W IRR SUCTION TUBE

## (undated) DEVICE — GOWN LG

## (undated) DEVICE — DRSG VAC GRANUFOAM SMALL (BLACK)

## (undated) DEVICE — SPECIMEN CONTAINER 100ML

## (undated) DEVICE — DRSG VAC GRANUFOAM LARGE (BLACK)

## (undated) DEVICE — DRAIN CHANNEL 19FR ROUND FULL FLUTED

## (undated) DEVICE — LUBRICATING JELLY ONESHOT 1.25OZ

## (undated) DEVICE — PACK T & A

## (undated) DEVICE — SOL IRR POUR H2O 250ML